# Patient Record
Sex: FEMALE | Race: WHITE | NOT HISPANIC OR LATINO | Employment: OTHER | URBAN - METROPOLITAN AREA
[De-identification: names, ages, dates, MRNs, and addresses within clinical notes are randomized per-mention and may not be internally consistent; named-entity substitution may affect disease eponyms.]

---

## 2017-01-19 ENCOUNTER — TRANSCRIBE ORDERS (OUTPATIENT)
Dept: ADMINISTRATIVE | Facility: HOSPITAL | Age: 67
End: 2017-01-19

## 2017-01-19 ENCOUNTER — LAB (OUTPATIENT)
Dept: LAB | Facility: HOSPITAL | Age: 67
End: 2017-01-19

## 2017-01-19 DIAGNOSIS — E55.9 UNSPECIFIED VITAMIN D DEFICIENCY: ICD-10-CM

## 2017-01-19 DIAGNOSIS — G47.9 SLEEP DISORDER: Primary | ICD-10-CM

## 2017-01-19 DIAGNOSIS — G47.9 SLEEP DISORDER: ICD-10-CM

## 2017-01-19 LAB
ALBUMIN SERPL-MCNC: 4.6 G/DL (ref 3.5–5.2)
ALBUMIN/GLOB SERPL: 1.5 G/DL
ALP SERPL-CCNC: 64 U/L (ref 39–117)
ALT SERPL W P-5'-P-CCNC: 16 U/L (ref 1–33)
ANION GAP SERPL CALCULATED.3IONS-SCNC: 12.5 MMOL/L
AST SERPL-CCNC: 24 U/L (ref 1–32)
BILIRUB SERPL-MCNC: 0.4 MG/DL (ref 0.1–1.2)
BUN BLD-MCNC: 24 MG/DL (ref 8–23)
BUN/CREAT SERPL: 28.6 (ref 7–25)
CALCIUM SPEC-SCNC: 9.8 MG/DL (ref 8.6–10.5)
CHLORIDE SERPL-SCNC: 103 MMOL/L (ref 98–107)
CO2 SERPL-SCNC: 25.5 MMOL/L (ref 22–29)
CREAT BLD-MCNC: 0.84 MG/DL (ref 0.57–1)
CRP SERPL-MCNC: 0.18 MG/DL (ref 0–0.5)
DEPRECATED RDW RBC AUTO: 43.4 FL (ref 37–54)
ERYTHROCYTE [DISTWIDTH] IN BLOOD BY AUTOMATED COUNT: 13.2 % (ref 11.7–13)
FERRITIN SERPL-MCNC: 85.56 NG/ML (ref 13–150)
GFR SERPL CREATININE-BSD FRML MDRD: 68 ML/MIN/1.73
GLOBULIN UR ELPH-MCNC: 3.1 GM/DL
GLUCOSE BLD-MCNC: 99 MG/DL (ref 65–99)
HCT VFR BLD AUTO: 39.8 % (ref 35.6–45.5)
HGB BLD-MCNC: 13.2 G/DL (ref 11.9–15.5)
MCH RBC QN AUTO: 29.8 PG (ref 26.9–32)
MCHC RBC AUTO-ENTMCNC: 33.2 G/DL (ref 32.4–36.3)
MCV RBC AUTO: 89.8 FL (ref 80.5–98.2)
PLATELET # BLD AUTO: 244 10*3/MM3 (ref 140–500)
PMV BLD AUTO: 10.6 FL (ref 6–12)
POTASSIUM BLD-SCNC: 4.3 MMOL/L (ref 3.5–5.2)
PROT SERPL-MCNC: 7.7 G/DL (ref 6–8.5)
RBC # BLD AUTO: 4.43 10*6/MM3 (ref 3.9–5.2)
SODIUM BLD-SCNC: 141 MMOL/L (ref 136–145)
T3FREE SERPL-MCNC: 2.62 PG/ML (ref 2–4.4)
TSH SERPL DL<=0.05 MIU/L-ACNC: 2.09 MIU/ML (ref 0.27–4.2)
VIT B12 BLD-MCNC: 1144 PG/ML (ref 211–946)
WBC NRBC COR # BLD: 5.41 10*3/MM3 (ref 4.5–10.7)

## 2017-01-19 PROCEDURE — 86140 C-REACTIVE PROTEIN: CPT

## 2017-01-19 PROCEDURE — 36415 COLL VENOUS BLD VENIPUNCTURE: CPT

## 2017-01-19 PROCEDURE — 84481 FREE ASSAY (FT-3): CPT

## 2017-01-19 PROCEDURE — 82607 VITAMIN B-12: CPT

## 2017-01-19 PROCEDURE — 84443 ASSAY THYROID STIM HORMONE: CPT

## 2017-01-19 PROCEDURE — 80053 COMPREHEN METABOLIC PANEL: CPT

## 2017-01-19 PROCEDURE — 82306 VITAMIN D 25 HYDROXY: CPT

## 2017-01-19 PROCEDURE — 82728 ASSAY OF FERRITIN: CPT

## 2017-01-19 PROCEDURE — 85027 COMPLETE CBC AUTOMATED: CPT

## 2017-01-20 LAB — 25(OH)D3 SERPL-MCNC: 105.7 NG/ML (ref 30–100)

## 2017-04-25 ENCOUNTER — TRANSCRIBE ORDERS (OUTPATIENT)
Dept: GASTROENTEROLOGY | Facility: CLINIC | Age: 67
End: 2017-04-25

## 2017-04-25 DIAGNOSIS — Z86.010 HX OF ADENOMATOUS COLONIC POLYPS: Primary | ICD-10-CM

## 2017-05-15 ENCOUNTER — APPOINTMENT (OUTPATIENT)
Dept: WOMENS IMAGING | Facility: HOSPITAL | Age: 67
End: 2017-05-15

## 2017-05-15 PROCEDURE — 76641 ULTRASOUND BREAST COMPLETE: CPT | Performed by: RADIOLOGY

## 2017-05-18 ENCOUNTER — OUTSIDE FACILITY SERVICE (OUTPATIENT)
Dept: GASTROENTEROLOGY | Facility: CLINIC | Age: 67
End: 2017-05-18

## 2017-05-18 PROCEDURE — 45380 COLONOSCOPY AND BIOPSY: CPT | Performed by: INTERNAL MEDICINE

## 2017-05-30 ENCOUNTER — TELEPHONE (OUTPATIENT)
Dept: GASTROENTEROLOGY | Facility: CLINIC | Age: 67
End: 2017-05-30

## 2017-11-17 ENCOUNTER — APPOINTMENT (OUTPATIENT)
Dept: WOMENS IMAGING | Facility: HOSPITAL | Age: 67
End: 2017-11-17

## 2017-11-17 PROCEDURE — G0202 SCR MAMMO BI INCL CAD: HCPCS | Performed by: RADIOLOGY

## 2017-11-17 PROCEDURE — 77063 BREAST TOMOSYNTHESIS BI: CPT | Performed by: RADIOLOGY

## 2017-11-17 PROCEDURE — 76641 ULTRASOUND BREAST COMPLETE: CPT | Performed by: RADIOLOGY

## 2018-06-14 ENCOUNTER — OFFICE VISIT (OUTPATIENT)
Dept: ORTHOPEDIC SURGERY | Facility: CLINIC | Age: 68
End: 2018-06-14

## 2018-06-14 VITALS — BODY MASS INDEX: 19.81 KG/M2 | WEIGHT: 116 LBS | HEIGHT: 64 IN | TEMPERATURE: 98.3 F

## 2018-06-14 DIAGNOSIS — M19.079 ARTHRITIS OF GREAT TOE AT METATARSOPHALANGEAL JOINT: ICD-10-CM

## 2018-06-14 DIAGNOSIS — M79.671 RIGHT FOOT PAIN: ICD-10-CM

## 2018-06-14 DIAGNOSIS — M20.21 ACQUIRED HALLUX RIGIDUS OF RIGHT FOOT: Primary | ICD-10-CM

## 2018-06-14 PROCEDURE — 73630 X-RAY EXAM OF FOOT: CPT | Performed by: ORTHOPAEDIC SURGERY

## 2018-06-14 PROCEDURE — 99203 OFFICE O/P NEW LOW 30 MIN: CPT | Performed by: ORTHOPAEDIC SURGERY

## 2018-06-14 RX ORDER — TOCOPHERSOLAN (VITAMIN E TPGS) 400/15ML
LIQUID (ML) ORAL
COMMUNITY
End: 2018-07-17 | Stop reason: HOSPADM

## 2018-06-14 NOTE — PROGRESS NOTES
"Patient:  Gayatri Lee is a 68 y.o. female    Chief Complaint/ Reason for Visit:    Chief Complaint   Patient presents with   • Right Foot - Establish Care, Pain       HPI:  This pleasant lady, for whom I have cared in the past and who is an avid ballroom dancer, presents today complaining of a 6 week history of moderate aching and stabbing pain in the dorsal aspect of her right forefoot directly centered over the first metatarsal phalangeal joint.  She says that she thinks that it is \"the result of overdoing it dancing.\"  She was apparently learning some new dance steps or routines and was practicing at home and says \"I just overdid it.\"  Now the pain bothers her whenever she tries to dance and go up on her toes.  She also has pain when going up and down stairs.  She has noted swelling since this overdoing it event, and the swelling is not resolved entirely.  Standing and walking exacerbate the pain.  She has iced the foot rested it, elevated, and is taken some over-the-counter anti-inflammatories.  All of these things afford her some relief, but she was particular concerned about the persistent swelling.      PMH:    Past Medical History:   Diagnosis Date   • H/O esophageal spasm    • History of colon polyps        PSH:    Past Surgical History:   Procedure Laterality Date   • ABDOMINOPLASTY  2004   • ADENOIDECTOMY  1955   • BREAST SURGERY Left 1976    LUMPECTOMY   • BREAST SURGERY Left 2004    LUMPECTOMY   • BROW LIFT AND BLEPHAROPLASTY  2004   • COLONOSCOPY  06/03/2013    TUBULAR ADENOMAS, TORT COLON,  DR. GOMEZ   • ENDOSCOPY     • HAND SURGERY  1990   • LAPAROTOMY OOPHERECTOMY  1994   • LARYNGOSCOPY      IRRITATION   DR. CORREA   • POSTPARTUM TUBAL LIGATION  1980   • TONSILLECTOMY  1955   • WISDOM TOOTH EXTRACTION  1970       Social Hx:    Social History     Social History   • Marital status:      Spouse name: N/A   • Number of children: N/A   • Years of education: N/A     Occupational History   • " "Not on file.     Social History Main Topics   • Smoking status: Never Smoker   • Smokeless tobacco: Never Used   • Alcohol use Yes      Comment: RARELY   • Drug use: No   • Sexual activity: Defer     Other Topics Concern   • Not on file     Social History Narrative   • No narrative on file       Family Hx:    Family History   Problem Relation Age of Onset   • Cancer Father    • Heart disease Father    • Stroke Father    • Cancer Sister        Meds:    Current Outpatient Prescriptions:   •  Calcium-Magnesium-Vitamin D 500-250-200 MG-MG-UNIT tablet, Take  by mouth., Disp: , Rfl:   •  EVENING PRIMROSE OIL PO, Take 1,300 mg by mouth., Disp: , Rfl:   •  KRILL OIL PO, Take 2,500 mg by mouth., Disp: , Rfl:   •  progesterone (PROMETRIUM) 100 MG capsule, TK 1 C PO D, Disp: , Rfl: 2  •  Unable to find, 2 tablets., Disp: , Rfl:   •  Unable to find, 2 tablets., Disp: , Rfl:   •  Unable to find, 1 tablet., Disp: , Rfl:   •  Unable to find, Take 1 tablet by mouth., Disp: , Rfl:   •  Unable to find, 1 tablet., Disp: , Rfl:   •  Unable to find, 2,000 Units., Disp: , Rfl:   •  VAGIFEM 10 MCG tablet vaginal tablet, INSERT ONE T VAGINALLY THREE TIMES PER WEEK, Disp: , Rfl: 2    Allergies:    Allergies   Allergen Reactions   • Sulfa Antibiotics Rash       ROS:  Review of Systems   Musculoskeletal: Positive for arthralgias, gait problem and joint swelling.   Psychiatric/Behavioral: Positive for sleep disturbance.   All other systems reviewed and are negative.      Vitals:    06/14/18 1020   Temp: 98.3 °F (36.8 °C)   Weight: 52.6 kg (116 lb)   Height: 162.6 cm (64\")     Body mass index is 19.91 kg/m².    Physical Exam    The patient is awake, alert, and oriented ×3.  The patient is in no acute distress.  Breathing is regular and unlabored with a respiratory rate of 12/m.  Extraocular movements and pupillary responses are symmetrically intact. Sclerae are anicteric.   Hearing is within normal limits.  Speech is within normal limits.  " There is no jugular venous distention.    She walks well with no pronounced limp.  Her lower extremities are fairly symmetrical in appearance with the exception that she does have swelling and fullness dorsally over the right first MTP joint.    Right foot: Patient does have fullness and tenderness over the dorsal right first MP joint.  She has limitation of dorsiflexion up to about 40-45° with pain at the terminus.  She has pain on passive plantar flexion as well.  There is no abnormal warmth or erythema.  She has no tenderness elsewhere in the right foot.  Her Achilles tendons are moderately tight bilaterally.  Pedal pulses are intact and regular.  Sensory exams intact light touch.  She has no cellulitis, no lymphangitis, and no popliteal lymphadenopathy in the right lower extremity.        Radiology: X-rays: 3 views the patient's right foot were ordered and reviewed today to assess her complaints of pain and swelling right forefoot.  Comparison images were not available.  Today's images reveal that she does have significant osteoarthritis in the right first MTP joint with joint line narrowing in all 3 views, and significant dorsal osteophyte formation over the first MP joint noted particularly on the lateral view.  I don't see any evidence of acute fracture or stress fracture.      Assessment:     Diagnosis Plan   1. Acquired hallux rigidus of right foot     2. 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 Right foot pain  XR Foot 3+ View Right           Plan:  I discussed everything with the patient length and reviewed her x-rays with her personally.  We discussed treatment options.  I recommended that she continue to stretch her Achilles tendons, as she has assured me that she does this on a daily basis and has done so since I treated her for an injury to her left foot several years ago.    We discussed footwear considerations and options.  Specific recommendations were made.  I also recommended  that she come back next week and see Gordo to have some carbon fiber turf toe inserts created.  She would like to avoid surgery.  We discussed surgery in detail including cheilectomy versus arthrodesis.  We discussed that even with cheilectomy, the underlying joint would remain an arthritic joint, and I explained that surgery may not afford her any relief in that respect.  She says she understands.      Orders Placed This Encounter   Procedures   • XR Foot 3+ View Right     Order Specific Question:   Reason for Exam:     Answer:   right foot pain

## 2018-07-17 ENCOUNTER — OFFICE VISIT (OUTPATIENT)
Dept: FAMILY MEDICINE CLINIC | Facility: CLINIC | Age: 68
End: 2018-07-17

## 2018-07-17 VITALS
HEART RATE: 68 BPM | SYSTOLIC BLOOD PRESSURE: 112 MMHG | OXYGEN SATURATION: 98 % | HEIGHT: 64 IN | BODY MASS INDEX: 19.81 KG/M2 | DIASTOLIC BLOOD PRESSURE: 70 MMHG | WEIGHT: 116 LBS | RESPIRATION RATE: 12 BRPM

## 2018-07-17 DIAGNOSIS — R73.03 PREDIABETES: ICD-10-CM

## 2018-07-17 DIAGNOSIS — M19.079 ARTHRITIS OF BIG TOE: Primary | ICD-10-CM

## 2018-07-17 DIAGNOSIS — Z23 NEED FOR VACCINATION: ICD-10-CM

## 2018-07-17 PROCEDURE — 99213 OFFICE O/P EST LOW 20 MIN: CPT | Performed by: FAMILY MEDICINE

## 2018-07-17 PROCEDURE — 90670 PCV13 VACCINE IM: CPT | Performed by: FAMILY MEDICINE

## 2018-07-17 PROCEDURE — G0009 ADMIN PNEUMOCOCCAL VACCINE: HCPCS | Performed by: FAMILY MEDICINE

## 2018-07-17 RX ORDER — ESTRADIOL 0.03 MG/D
FILM, EXTENDED RELEASE TRANSDERMAL
COMMUNITY
Start: 2018-07-16 | End: 2019-04-19

## 2018-07-17 RX ORDER — VITAMIN E 268 MG
200 CAPSULE ORAL DAILY
COMMUNITY
End: 2019-05-08

## 2018-07-17 RX ORDER — DIINDOLYLMETHANE 100 %
150 POWDER (GRAM) MISCELLANEOUS
COMMUNITY
End: 2019-05-08

## 2018-07-17 NOTE — PATIENT INSTRUCTIONS
Look into New Balance brand professional shoes.      Consider starting a glucosamine supplement.

## 2018-07-17 NOTE — PROGRESS NOTES
"Subjective   Gayatri Lee is a 68 y.o. female.     Chief Complaint   Patient presents with   • Establish Care   • Joint pain     foot pain   • Abnormal Lab       HPI     Foot problems:  -pain at the base of the big toes bilaterally, left worse than right  -pt dances ballroom and swing dancing styles several times per week  -pt reports that her Orthopedic Surgeon, Dr. Holm at [facility] found osteoarthritis on x-rays of 1st MTP joint of the L foot but advised against surgery  -she has an appointment with Sports Med next month  -pain is worsened by rising up on the toes  -\"fredy effect\" with pain coming up into the ankles and low back  -she wears small orthotics in her shoes  -she uses Ibuprofen and ice packs with some relief    Abnormal labs:  Prediabetes with most recent HgB A1C 6.3% in April 2018  -pt participates in yoga and weight training in addition to dance and tries to follow a plant based diet with occasional intake of fish and chicken   -mildly elevated total cholesterol at 233, , , and triglcyerides 97  -TSH mildly elevated at 3.1  -pt is not fasting today    She is interested in homeopathy and eastern medicine and a practitioner she saw recommended a high protein diet, but this made her feel worse.      Follows with Dr. Elisa Chance for HRT to treat menopausal symptoms    Hx of recurrent sinus infections.  Improved since she started using a silver infused preventative nasal spray and using a home air filter         Review of Systems   Constitutional: Negative for fatigue and fever.   HENT: Negative for congestion and sore throat.    Respiratory: Negative for cough and shortness of breath.    Cardiovascular: Negative for chest pain and palpitations.   Gastrointestinal: Negative for abdominal pain, constipation, diarrhea, nausea and vomiting.   Musculoskeletal: Positive for arthralgias (bilateral foot pain, left worse than right). Negative for gait problem.   Skin: Negative for " rash and wound.   Neurological: Negative for dizziness and headaches.   Psychiatric/Behavioral: Negative for dysphoric mood. The patient is not nervous/anxious.        The following portions of the patient's history were reviewed and updated as appropriate: allergies, current medications, past family history, past medical history, past social history, past surgical history and problem list.    Past Medical History:   Diagnosis Date   • Arthritis     of feet, followed by Dr. Holm   • Elevated TSH    • Endometriosis    • Fibrocystic breast    • History of colon polyps     followed by GI, Dr. Cuba   • Hyperlipidemia    • Menopausal symptoms     followed by Integrative Medicine, Dr. Elisa Chance   • Osteopenia     followed by Gynecology, Dr. Tequila Boyle   • Prediabetes    • Rectocele     s/p surgical repair at time of hysterctomy   • Recurrent sinusitis      Past Surgical History:   Procedure Laterality Date   • ABDOMINOPLASTY  2004   • ADENOIDECTOMY  1955   • BREAST SURGERY Left 1976    LUMPECTOMY   • BREAST SURGERY Left 2004    LUMPECTOMY   • BROW LIFT AND BLEPHAROPLASTY  2004   • COLONOSCOPY  06/03/2013    TUBULAR ADENOMAS, TORT COLON,  DR. CUBA   • CYSTOCELE REPAIR  05/2017   • ENDOSCOPY     • HAND SURGERY  1990   • HYSTERECTOMY     • LAPAROTOMY OOPHERECTOMY  1994   • LARYNGOSCOPY      IRRITATION   DR. CORREA   • POSTPARTUM TUBAL LIGATION  1980   • TONSILLECTOMY  1955   • TUBAL ABDOMINAL LIGATION     • WISDOM TOOTH EXTRACTION  1970     Family History   Problem Relation Age of Onset   • Cancer Father         Skin   • Heart disease Father    • Stroke Father    • Cancer Sister    • Macular degeneration Sister    • Arthritis Sister    • Alcohol abuse Mother      Social History     Social History   • Marital status:      Social History Main Topics   • Smoking status: Never Smoker   • Smokeless tobacco: Never Used   • Alcohol use Yes      Comment: about once per month   • Drug use: No       Social  History Narrative    Lives at home in a place of her own.  Enjoys tap and ballroom dance.          Allergies   Allergen Reactions   • Sulfa Antibiotics Rash        Outpatient Medications Prior to Visit   Medication Sig Dispense Refill   • EVENING PRIMROSE OIL PO Take 1,300 mg by mouth.     • KRILL OIL PO Take 2,500 mg by mouth.     • progesterone (PROMETRIUM) 100 MG capsule TK 1 C PO D  2   • Calcium-Magnesium-Vitamin D 500-250-200 MG-MG-UNIT tablet Take  by mouth.     • Unable to find 2 tablets.     • Unable to find 2 tablets.     • Unable to find 1 tablet.     • Unable to find Take 1 tablet by mouth.     • Unable to find 1 tablet.     • Unable to find 2,000 Units.     • VAGIFEM 10 MCG tablet vaginal tablet INSERT ONE T VAGINALLY THREE TIMES PER WEEK  2     No facility-administered medications prior to visit.        Objective     Vitals:    07/17/18 1411   BP: 112/70   Pulse: 68   Resp: 12   SpO2: 98%   BMI 20    Physical Exam   Constitutional: She appears well-developed and well-nourished. No distress.   HENT:   Head: Normocephalic and atraumatic.   Neck: No thyromegaly present.   Cardiovascular: Normal rate, regular rhythm and normal heart sounds.  Exam reveals no gallop and no friction rub.    No murmur heard.  Pulmonary/Chest: Effort normal and breath sounds normal. No respiratory distress. She has no wheezes. She has no rhonchi. She has no rales.   Abdominal: Soft. Bowel sounds are normal. She exhibits no distension. There is no tenderness.   Musculoskeletal:   Moderate arthritis and crepitus of bilateral 1st MTP joints with NO erythema   Lymphadenopathy:     She has no cervical adenopathy.   Skin: Skin is warm and dry.   Psychiatric: She has a normal mood and affect. Her behavior is normal.       ASSESSMENT/PLAN       Problem List Items Addressed This Visit        Musculoskeletal and Integument    Arthritis of big toe - Primary    Relevant Medications    Trial of diclofenac (VOLTAREN) 1 % gel gel  Follow up  w/Ortho and Sports Med as scheduled       Other    Prediabetes  Continue diet and exercise modifications  Repeat fasting labs in 3 months       Other Visit Diagnoses     Need for vaccination        Relevant Orders    Pneumococcal Conjugate Vaccine 13-Valent All (PCV13) (Completed)            Patient Instructions   Look into New Balance brand professional shoes.      Consider starting a glucosamine supplement.      Return in about 3 months (around 10/17/2018) for Medicare Wellness with fasting labs.      Narcisa Villafana MD  07/17/18

## 2018-07-23 PROBLEM — R73.03 PREDIABETES: Status: ACTIVE | Noted: 2018-07-23

## 2018-08-01 ENCOUNTER — TELEPHONE (OUTPATIENT)
Dept: ORTHOPEDIC SURGERY | Facility: CLINIC | Age: 68
End: 2018-08-01

## 2018-10-15 ENCOUNTER — OFFICE VISIT (OUTPATIENT)
Dept: FAMILY MEDICINE CLINIC | Facility: CLINIC | Age: 68
End: 2018-10-15

## 2018-10-15 VITALS
HEART RATE: 65 BPM | WEIGHT: 118 LBS | SYSTOLIC BLOOD PRESSURE: 108 MMHG | RESPIRATION RATE: 12 BRPM | HEIGHT: 64 IN | BODY MASS INDEX: 20.14 KG/M2 | OXYGEN SATURATION: 99 % | DIASTOLIC BLOOD PRESSURE: 76 MMHG

## 2018-10-15 DIAGNOSIS — Z86.2 HISTORY OF ANEMIA: ICD-10-CM

## 2018-10-15 DIAGNOSIS — E78.5 HYPERLIPIDEMIA, UNSPECIFIED HYPERLIPIDEMIA TYPE: ICD-10-CM

## 2018-10-15 DIAGNOSIS — Z00.00 ANNUAL PHYSICAL EXAM: Primary | ICD-10-CM

## 2018-10-15 DIAGNOSIS — S46.811A STRAIN OF RIGHT TRAPEZIUS MUSCLE, INITIAL ENCOUNTER: ICD-10-CM

## 2018-10-15 DIAGNOSIS — Z13.1 SCREENING FOR DIABETES MELLITUS: ICD-10-CM

## 2018-10-15 DIAGNOSIS — Z11.59 NEED FOR HEPATITIS C SCREENING TEST: ICD-10-CM

## 2018-10-15 PROCEDURE — G0439 PPPS, SUBSEQ VISIT: HCPCS | Performed by: FAMILY MEDICINE

## 2018-10-15 RX ORDER — VALACYCLOVIR HYDROCHLORIDE 500 MG/1
500 TABLET, FILM COATED ORAL DAILY
Refills: 1 | COMMUNITY
Start: 2018-09-19 | End: 2019-02-12 | Stop reason: HOSPADM

## 2018-10-15 NOTE — PROGRESS NOTES
Medicare Subsequent Wellness Visit  Subjective   History of Present Illness    Gayatri Lee is a 68 y.o. female who presents for an Medicare Wellness Visit. 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    Past Medical History:   Diagnosis Date   • Arthritis    • Basal cell carcinoma     followed by Dermatology   • Elevated TSH    • Endometriosis    • Fibrocystic breast    • History of colon polyps     followed by GI, Dr. Cuba   • Hyperlipidemia    • Menopausal symptoms     followed by Integrative Medicine, Dr. Elisa Chance   • Osteopenia     followed by Gynecology, Dr. Tequila Boyle   • Prediabetes    • Rectocele     s/p surgical repair at time of hysterctomy   • Recurrent sinusitis      Past Surgical History:   Procedure Laterality Date   • ABDOMINOPLASTY  2004   • ADENOIDECTOMY  1955   • BREAST SURGERY Left 1976    LUMPECTOMY   • BREAST SURGERY Left 2004    LUMPECTOMY   • BROW LIFT AND BLEPHAROPLASTY  2004   • COLONOSCOPY  06/03/2013    TUBULAR ADENOMAS, TORT COLON,   DR. GOMEZ   • CYSTOCELE REPAIR  05/2017   • ENDOSCOPY     • HAND SURGERY  1990   • HYSTERECTOMY     • LAPAROTOMY OOPHERECTOMY  1994   • LARYNGOSCOPY      IRRITATION   DR. CORREA   • POSTPARTUM TUBAL LIGATION  1980   • TONSILLECTOMY  1955   • TUBAL ABDOMINAL LIGATION     • WISDOM TOOTH EXTRACTION  1970       Family History   Problem Relation Age of Onset   • Cancer Father         Skin   • Heart disease Father    • Stroke Father    • Cancer Sister    • Macular degeneration Sister    • Arthritis Sister    • Alcohol abuse Mother        Social History     Social History   • Marital status:        Social History Main Topics   • Smoking status: Never Smoker   • Smokeless tobacco: Never Used   • Alcohol use Yes      Comment: about once per month   • Drug use: No   • Sexual activity: Yes     Social History Narrative    Lives at home in a place of her own.  Enjoys tap and ballroom dance.           Allergies   Allergen Reactions   • Sulfa Antibiotics Rash       Outpatient Medications Prior to Visit   Medication Sig Dispense Refill   • Calcium Carbonate (CALCIUM 600 PO) Take 900 mg by mouth.     • diclofenac (VOLTAREN) 1 % gel gel Apply 4 g topically 4 (Four) Times a Day As Needed (toe pain). 100 g 1   • Diindolylmethane powder 150 mg.     • estradiol (VIVELLE-DOT) 0.025 MG/24HR patch      • Estriol 10 % cream      • EVENING PRIMROSE OIL PO Take 1,300 mg by mouth.     • KRILL OIL PO Take 2,500 mg by mouth.     • Nutritional Supplements (PYCNOGENOL PO) Take 100 mg by mouth.     • Probiotic Product (PROBIOTIC ADVANCED PO) Take  by mouth.     • progesterone (PROMETRIUM) 100 MG capsule TK 1 C PO D  2   • Ubiquinol 100 MG capsule Take 100 mg by mouth.     • vitamin E 400 UNIT capsule Take 200 Units by mouth Daily.       No facility-administered medications prior to visit.               Patient Care Team:  Narcisa Villafana MD as PCP - General (Family Medicine)  Jaimson Castellanos MD as Consulting Physician  (Dermatology)  Felix Cuba MD as Consulting Physician (Gastroenterology)  Tequila Boyle MD as Consulting Physician (Obstetrics and Gynecology)         Recent Hospitalizations:  none    Age-appropriate Screening Schedule:  Refer to the list below for future screening recommendations based on patient's age. Orders for these recommended tests are listed in the plan section. The patient has been provided with a written plan.    Health Maintenance   Topic Date Due   • ZOSTER VACCINE (1 of 2) 01/31/2000   • HEPATITIS C SCREENING  04/25/2017   • LIPID PANEL  07/23/2018   • DXA SCAN  10/01/2019 (Originally 7/17/2018)   • TDAP/TD VACCINES (1 - Tdap) 08/27/2026 (Originally 8/28/2016)   • PNEUMOCOCCAL VACCINES (65+ LOW/MEDIUM RISK) (2 of 2 - PPSV23) 07/17/2019   • MEDICARE ANNUAL WELLNESS  10/15/2019   • MAMMOGRAM  11/07/2019   • COLONOSCOPY  05/18/2020   • INFLUENZA VACCINE  Addressed       Depression Screen:   PHQ-2/PHQ-9 Depression Screening 10/15/2018   Little interest or pleasure in doing things 0   Feeling down, depressed, or hopeless 0   Total Score 0       Functional and Cognitive Screening:  Functional & Cognitive Status 10/15/2018   Do you have difficulty preparing food and eating? No   Do you have difficulty bathing yourself, getting dressed or grooming yourself? No   Do you have difficulty using the toilet? No   Do you have difficulty moving around from place to place? No   Do you have trouble with steps or getting out of a bed or a chair? No   In the past year have you fallen or experienced a near fall? No   Current Diet Well Balanced Diet   Dental Exam Up to date   Eye Exam Not up to date   Current Exercise Activities Include Yoga   Do you need help using the phone?  No   Are you deaf or do you have serious difficulty hearing?  No   Do you need help with transportation? No   Do you need help shopping? No   Do you need help preparing meals?  No   Do you need help with housework?  No   Do you need  "help with laundry? No   Do you need help taking your medications? No   Do you need help managing money? No   Do you ever drive or ride in a car without wearing a seat belt? No   Have you felt unusual stress, anger or loneliness in the last month? No   Who do you live with? Alone   If you need help, do you have trouble finding someone available to you? No   Have you been bothered in the last four weeks by sexual problems? No   Do you have difficulty concentrating, remembering or making decisions? No     Does the patient have evidence of cognitive impairment? No      Review of Systems   Constitutional: Negative for fatigue and fever.   HENT: Negative for congestion and sore throat.    Respiratory: Negative for cough and shortness of breath.    Cardiovascular: Negative for chest pain and palpitations.   Gastrointestinal: Negative for abdominal pain and nausea.   Musculoskeletal: Positive for arthralgias (R shoulder as per  HPI). Negative for myalgias.   Neurological: Negative for dizziness, weakness, numbness and headaches.       Objective     Vitals:    10/15/18 1051   BP: 108/76   Pulse: 65   Resp: 12   SpO2: 99%   Weight: 53.5 kg (118 lb)   Height: 162.6 cm (64.02\")       Body mass index is 20.24 kg/m².    Physical Exam   Constitutional: Vital signs are normal. She appears well-developed and well-nourished. No distress.   HENT:   Head: Normocephalic and atraumatic.   Nose: Nose normal.   Mouth/Throat: Oropharynx is clear and moist.   Eyes: Pupils are equal, round, and reactive to light. Conjunctivae are normal.   Neck: No thyromegaly present.   Cardiovascular: Normal rate, regular rhythm, S1 normal, S2 normal and normal heart sounds.  Exam reveals no gallop and no friction rub.    No murmur heard.  Pulses:       Radial pulses are 2+ on the right side, and 2+ on the left side.   Pulmonary/Chest: Effort normal and breath sounds normal. She has no wheezes. She has no rales.   Abdominal: Soft. Bowel sounds are normal. " She exhibits no distension. There is no hepatosplenomegaly. There is no tenderness. There is no rebound and no guarding.   Musculoskeletal: She exhibits no edema or deformity.        Right shoulder: She exhibits normal range of motion, no bony tenderness and normal strength.        Left shoulder: Normal.        Cervical back: Normal.        Thoracic back: Normal.   TTP of R trapezius muscle.  Negative empty can and Neer's tests bilaterally.     Lymphadenopathy:     She has no cervical adenopathy.        Right: No supraclavicular adenopathy present.        Left: No supraclavicular adenopathy present.   Neurological: She is alert. She has normal strength. Gait normal.   Skin: Skin is warm and dry. No cyanosis. Nails show no clubbing.   Psychiatric: She has a normal mood and affect. Her speech is normal and behavior is normal.   Nursing note and vitals reviewed.      ASSESSMENT AND PLAN      Problem List Items Addressed This Visit        Cardiovascular and Mediastinum    Hyperlipidemia    Relevant Orders    Lipid Panel With / Chol / HDL Ratio      Other Visit Diagnoses     Annual physical exam    -  Primary    Relevant Orders    CBC (No Diff)    Comprehensive Metabolic Panel    Lipid Panel With / Chol / HDL Ratio    Hepatitis C Antibody    Strain of right trapezius muscle, initial encounter        Relevant Orders    Ambulatory Referral to Physical Therapy    Need for hepatitis C screening test        Relevant Orders    Hepatitis C Antibody        Screening for diabetes mellitus        Relevant Orders    Comprehensive Metabolic Panel    History of anemia        Relevant Orders    CBC (No Diff)          Orders:  Orders Placed This Encounter   Procedures   • CBC (No Diff)   • Comprehensive Metabolic Panel   • Lipid Panel With / Chol / HDL Ratio   • Hepatitis C Antibody   • Ambulatory Referral to Physical Therapy       Follow Up:  Return in about 1 year (around 10/15/2019) for Annual.         An After Visit Summary and  PPPS with all of these plans were given to the patient.

## 2018-10-23 ENCOUNTER — LAB (OUTPATIENT)
Dept: FAMILY MEDICINE CLINIC | Facility: CLINIC | Age: 68
End: 2018-10-23

## 2018-10-23 DIAGNOSIS — Z00.00 ANNUAL PHYSICAL EXAM: ICD-10-CM

## 2018-10-23 DIAGNOSIS — E78.5 HYPERLIPIDEMIA, UNSPECIFIED HYPERLIPIDEMIA TYPE: ICD-10-CM

## 2018-10-23 DIAGNOSIS — Z11.59 NEED FOR HEPATITIS C SCREENING TEST: ICD-10-CM

## 2018-10-23 DIAGNOSIS — Z86.2 HISTORY OF ANEMIA: ICD-10-CM

## 2018-10-23 DIAGNOSIS — Z13.1 SCREENING FOR DIABETES MELLITUS: ICD-10-CM

## 2018-10-23 LAB
ALBUMIN SERPL-MCNC: 4.4 G/DL (ref 3.5–5.2)
ALBUMIN/GLOB SERPL: 1.5 G/DL
ALP SERPL-CCNC: 58 U/L (ref 39–117)
ALT SERPL-CCNC: 26 U/L (ref 1–33)
AST SERPL-CCNC: 28 U/L (ref 1–32)
BILIRUB SERPL-MCNC: 0.4 MG/DL (ref 0.1–1.2)
BUN SERPL-MCNC: 23 MG/DL (ref 8–23)
BUN/CREAT SERPL: 29.5 (ref 7–25)
CALCIUM SERPL-MCNC: 9.8 MG/DL (ref 8.6–10.5)
CHLORIDE SERPL-SCNC: 102 MMOL/L (ref 98–107)
CHOLEST SERPL-MCNC: 243 MG/DL (ref 0–200)
CHOLEST/HDLC SERPL: 2.51 {RATIO}
CO2 SERPL-SCNC: 28.3 MMOL/L (ref 22–29)
CREAT SERPL-MCNC: 0.78 MG/DL (ref 0.57–1)
ERYTHROCYTE [DISTWIDTH] IN BLOOD BY AUTOMATED COUNT: 14.3 % (ref 11.7–13)
GLOBULIN SER CALC-MCNC: 2.9 GM/DL
GLUCOSE SERPL-MCNC: 91 MG/DL (ref 65–99)
HCT VFR BLD AUTO: 40.9 % (ref 35.6–45.5)
HDLC SERPL-MCNC: 97 MG/DL (ref 40–60)
HGB BLD-MCNC: 12.8 G/DL (ref 11.9–15.5)
LDLC SERPL CALC-MCNC: 131 MG/DL (ref 0–100)
MCH RBC QN AUTO: 28.8 PG (ref 26.9–32)
MCHC RBC AUTO-ENTMCNC: 31.3 G/DL (ref 32.4–36.3)
MCV RBC AUTO: 91.9 FL (ref 80.5–98.2)
PLATELET # BLD AUTO: 276 10*3/MM3 (ref 140–500)
POTASSIUM SERPL-SCNC: 4.4 MMOL/L (ref 3.5–5.2)
PROT SERPL-MCNC: 7.3 G/DL (ref 6–8.5)
RBC # BLD AUTO: 4.45 10*6/MM3 (ref 3.9–5.2)
SODIUM SERPL-SCNC: 142 MMOL/L (ref 136–145)
TRIGL SERPL-MCNC: 74 MG/DL (ref 0–150)
VLDLC SERPL CALC-MCNC: 14.8 MG/DL (ref 5–40)
WBC # BLD AUTO: 6 10*3/MM3 (ref 4.5–10.7)

## 2018-10-24 LAB — HCV AB S/CO SERPL IA: <0.1 S/CO RATIO (ref 0–0.9)

## 2018-10-29 ENCOUNTER — TELEPHONE (OUTPATIENT)
Dept: FAMILY MEDICINE CLINIC | Facility: CLINIC | Age: 68
End: 2018-10-29

## 2018-10-29 NOTE — TELEPHONE ENCOUNTER
Please call her back and advise her that her BUN/Creatinine ratio is only mildly increased, and unlikely to cause any significant problems for her at this time.  However, increasing her water intake should help normalize it.

## 2018-10-29 NOTE — TELEPHONE ENCOUNTER
Called and spoke with patient to let her know lab results and advise diet & exercise. She asked about her BUN/ Creatinine level and if there was any diet plan or tips she could do to bring that down.    ----- Message from Narcisa Villafana MD sent at 10/29/2018  7:51 AM EDT -----  Please contact pt and let her know that her cholesterol is a little high, but not to the degree that she needs medication at this time.   Exercising regularly and eating a healthy diet rich in fresh produce, fiber, and lean protein but low in added sugar and fat can help manage this problem and reduce the risk for heart disease in the future.  Let's plan to re-check it in 6 months-1 year.

## 2018-11-19 ENCOUNTER — APPOINTMENT (OUTPATIENT)
Dept: WOMENS IMAGING | Facility: HOSPITAL | Age: 68
End: 2018-11-19

## 2018-11-19 PROCEDURE — 76641 ULTRASOUND BREAST COMPLETE: CPT | Performed by: RADIOLOGY

## 2018-11-19 PROCEDURE — 77063 BREAST TOMOSYNTHESIS BI: CPT | Performed by: RADIOLOGY

## 2018-11-19 PROCEDURE — MDREVIEWSP: Performed by: RADIOLOGY

## 2018-11-19 PROCEDURE — 77067 SCR MAMMO BI INCL CAD: CPT | Performed by: RADIOLOGY

## 2019-02-10 ENCOUNTER — APPOINTMENT (OUTPATIENT)
Dept: ULTRASOUND IMAGING | Facility: HOSPITAL | Age: 69
End: 2019-02-10

## 2019-02-10 ENCOUNTER — HOSPITAL ENCOUNTER (INPATIENT)
Facility: HOSPITAL | Age: 69
LOS: 2 days | Discharge: HOME | End: 2019-02-12
Attending: EMERGENCY MEDICINE | Admitting: INTERNAL MEDICINE

## 2019-02-10 ENCOUNTER — APPOINTMENT (OUTPATIENT)
Dept: CT IMAGING | Facility: HOSPITAL | Age: 69
End: 2019-02-10

## 2019-02-10 DIAGNOSIS — N61.1 ABSCESS OF RIGHT BREAST UNRELATED TO PREGNANCY OR BREASTFEEDING: Primary | ICD-10-CM

## 2019-02-10 LAB
ALBUMIN SERPL-MCNC: 4.4 G/DL (ref 3.5–5.2)
ALBUMIN/GLOB SERPL: 1 G/DL
ALP SERPL-CCNC: 76 U/L (ref 39–117)
ALT SERPL W P-5'-P-CCNC: 49 U/L (ref 1–33)
ANION GAP SERPL CALCULATED.3IONS-SCNC: 14.5 MMOL/L
AST SERPL-CCNC: 27 U/L (ref 1–32)
BACTERIA UR QL AUTO: ABNORMAL /HPF
BASOPHILS # BLD AUTO: 0.09 10*3/MM3 (ref 0–0.2)
BASOPHILS NFR BLD AUTO: 0.9 % (ref 0–1.5)
BILIRUB SERPL-MCNC: 0.4 MG/DL (ref 0.1–1.2)
BILIRUB UR QL STRIP: NEGATIVE
BUN BLD-MCNC: 17 MG/DL (ref 8–23)
BUN/CREAT SERPL: 22.7 (ref 7–25)
CALCIUM SPEC-SCNC: 10.7 MG/DL (ref 8.6–10.5)
CHLORIDE SERPL-SCNC: 97 MMOL/L (ref 98–107)
CLARITY UR: CLEAR
CO2 SERPL-SCNC: 27.5 MMOL/L (ref 22–29)
COLOR UR: YELLOW
CREAT BLD-MCNC: 0.75 MG/DL (ref 0.57–1)
D-LACTATE SERPL-SCNC: 0.8 MMOL/L (ref 0.5–2)
DEPRECATED RDW RBC AUTO: 44.8 FL (ref 37–54)
EOSINOPHIL # BLD AUTO: 0.26 10*3/MM3 (ref 0–0.7)
EOSINOPHIL NFR BLD AUTO: 2.5 % (ref 0.3–6.2)
ERYTHROCYTE [DISTWIDTH] IN BLOOD BY AUTOMATED COUNT: 13.2 % (ref 11.7–13)
GFR SERPL CREATININE-BSD FRML MDRD: 77 ML/MIN/1.73
GLOBULIN UR ELPH-MCNC: 4.2 GM/DL
GLUCOSE BLD-MCNC: 95 MG/DL (ref 65–99)
GLUCOSE UR STRIP-MCNC: NEGATIVE MG/DL
HCT VFR BLD AUTO: 40.4 % (ref 35.6–45.5)
HGB BLD-MCNC: 13.4 G/DL (ref 11.9–15.5)
HGB UR QL STRIP.AUTO: ABNORMAL
HOLD SPECIMEN: NORMAL
HOLD SPECIMEN: NORMAL
HYALINE CASTS UR QL AUTO: ABNORMAL /LPF
IMM GRANULOCYTES # BLD AUTO: 0.02 10*3/MM3 (ref 0–0.03)
IMM GRANULOCYTES NFR BLD AUTO: 0.2 % (ref 0–0.5)
INR PPP: 0.96 (ref 0.9–1.1)
KETONES UR QL STRIP: NEGATIVE
LEUKOCYTE ESTERASE UR QL STRIP.AUTO: NEGATIVE
LYMPHOCYTES # BLD AUTO: 1.93 10*3/MM3 (ref 0.9–4.8)
LYMPHOCYTES NFR BLD AUTO: 18.5 % (ref 19.6–45.3)
MCH RBC QN AUTO: 30.8 PG (ref 26.9–32)
MCHC RBC AUTO-ENTMCNC: 33.2 G/DL (ref 32.4–36.3)
MCV RBC AUTO: 92.9 FL (ref 80.5–98.2)
MONOCYTES # BLD AUTO: 0.73 10*3/MM3 (ref 0.2–1.2)
MONOCYTES NFR BLD AUTO: 7 % (ref 5–12)
NEUTROPHILS # BLD AUTO: 7.42 10*3/MM3 (ref 1.9–8.1)
NEUTROPHILS NFR BLD AUTO: 70.9 % (ref 42.7–76)
NITRITE UR QL STRIP: NEGATIVE
PH UR STRIP.AUTO: 6.5 [PH] (ref 5–8)
PLATELET # BLD AUTO: 321 10*3/MM3 (ref 140–500)
PMV BLD AUTO: 10 FL (ref 6–12)
POTASSIUM BLD-SCNC: 4.3 MMOL/L (ref 3.5–5.2)
PROT SERPL-MCNC: 8.6 G/DL (ref 6–8.5)
PROT UR QL STRIP: NEGATIVE
PROTHROMBIN TIME: 12.6 SECONDS (ref 11.7–14.2)
RBC # BLD AUTO: 4.35 10*6/MM3 (ref 3.9–5.2)
RBC # UR: ABNORMAL /HPF
REF LAB TEST METHOD: ABNORMAL
SODIUM BLD-SCNC: 139 MMOL/L (ref 136–145)
SP GR UR STRIP: 1.02 (ref 1–1.03)
SQUAMOUS #/AREA URNS HPF: ABNORMAL /HPF
UROBILINOGEN UR QL STRIP: ABNORMAL
WBC NRBC COR # BLD: 10.45 10*3/MM3 (ref 4.5–10.7)
WBC UR QL AUTO: ABNORMAL /HPF
WHOLE BLOOD HOLD SPECIMEN: NORMAL
WHOLE BLOOD HOLD SPECIMEN: NORMAL

## 2019-02-10 PROCEDURE — 80053 COMPREHEN METABOLIC PANEL: CPT | Performed by: NURSE PRACTITIONER

## 2019-02-10 PROCEDURE — 25010000002 IOPAMIDOL 61 % SOLUTION: Performed by: INTERNAL MEDICINE

## 2019-02-10 PROCEDURE — 71260 CT THORAX DX C+: CPT

## 2019-02-10 PROCEDURE — 25010000002 VANCOMYCIN PER 500 MG: Performed by: NURSE PRACTITIONER

## 2019-02-10 PROCEDURE — 81001 URINALYSIS AUTO W/SCOPE: CPT | Performed by: NURSE PRACTITIONER

## 2019-02-10 PROCEDURE — 87040 BLOOD CULTURE FOR BACTERIA: CPT | Performed by: NURSE PRACTITIONER

## 2019-02-10 PROCEDURE — 25010000002 PIPERACILLIN SOD-TAZOBACTAM PER 1 G: Performed by: NURSE PRACTITIONER

## 2019-02-10 PROCEDURE — 85025 COMPLETE CBC W/AUTO DIFF WBC: CPT | Performed by: NURSE PRACTITIONER

## 2019-02-10 PROCEDURE — 99284 EMERGENCY DEPT VISIT MOD MDM: CPT

## 2019-02-10 PROCEDURE — 99221 1ST HOSP IP/OBS SF/LOW 40: CPT | Performed by: SURGERY

## 2019-02-10 PROCEDURE — 85610 PROTHROMBIN TIME: CPT | Performed by: NURSE PRACTITIONER

## 2019-02-10 PROCEDURE — 83605 ASSAY OF LACTIC ACID: CPT | Performed by: EMERGENCY MEDICINE

## 2019-02-10 PROCEDURE — 25010000002 PIPERACILLIN SOD-TAZOBACTAM PER 1 G: Performed by: INTERNAL MEDICINE

## 2019-02-10 RX ORDER — SODIUM CHLORIDE 0.9 % (FLUSH) 0.9 %
10 SYRINGE (ML) INJECTION AS NEEDED
Status: DISCONTINUED | OUTPATIENT
Start: 2019-02-10 | End: 2019-02-12 | Stop reason: HOSPADM

## 2019-02-10 RX ORDER — OXYCODONE HYDROCHLORIDE AND ACETAMINOPHEN 5; 325 MG/1; MG/1
1 TABLET ORAL ONCE
Status: DISCONTINUED | OUTPATIENT
Start: 2019-02-10 | End: 2019-02-12 | Stop reason: HOSPADM

## 2019-02-10 RX ORDER — SODIUM CHLORIDE 0.9 % (FLUSH) 0.9 %
3-10 SYRINGE (ML) INJECTION AS NEEDED
Status: DISCONTINUED | OUTPATIENT
Start: 2019-02-10 | End: 2019-02-12 | Stop reason: HOSPADM

## 2019-02-10 RX ORDER — HYDROCODONE BITARTRATE AND ACETAMINOPHEN 5; 325 MG/1; MG/1
1 TABLET ORAL EVERY 4 HOURS PRN
Status: DISCONTINUED | OUTPATIENT
Start: 2019-02-10 | End: 2019-02-12 | Stop reason: HOSPADM

## 2019-02-10 RX ORDER — LANOLIN ALCOHOL/MO/W.PET/CERES
400 CREAM (GRAM) TOPICAL DAILY
COMMUNITY
End: 2019-09-16

## 2019-02-10 RX ORDER — ONDANSETRON 4 MG/1
4 TABLET, FILM COATED ORAL EVERY 6 HOURS PRN
Status: DISCONTINUED | OUTPATIENT
Start: 2019-02-10 | End: 2019-02-12 | Stop reason: HOSPADM

## 2019-02-10 RX ORDER — VANCOMYCIN HYDROCHLORIDE 1 G/200ML
20 INJECTION, SOLUTION INTRAVENOUS ONCE
Status: COMPLETED | OUTPATIENT
Start: 2019-02-10 | End: 2019-02-10

## 2019-02-10 RX ORDER — SENNA AND DOCUSATE SODIUM 50; 8.6 MG/1; MG/1
2 TABLET, FILM COATED ORAL 2 TIMES DAILY PRN
Status: DISCONTINUED | OUTPATIENT
Start: 2019-02-10 | End: 2019-02-12 | Stop reason: HOSPADM

## 2019-02-10 RX ORDER — LANOLIN ALCOHOL/MO/W.PET/CERES
20 CREAM (GRAM) TOPICAL DAILY
COMMUNITY
End: 2019-05-08

## 2019-02-10 RX ORDER — ONDANSETRON 4 MG/1
4 TABLET, ORALLY DISINTEGRATING ORAL EVERY 6 HOURS PRN
Status: DISCONTINUED | OUTPATIENT
Start: 2019-02-10 | End: 2019-02-12 | Stop reason: HOSPADM

## 2019-02-10 RX ORDER — MULTIVIT-MIN/IRON/FOLIC ACID/K 18-600-40
135 CAPSULE ORAL DAILY
COMMUNITY
End: 2019-08-15

## 2019-02-10 RX ORDER — ONDANSETRON 2 MG/ML
4 INJECTION INTRAMUSCULAR; INTRAVENOUS EVERY 6 HOURS PRN
Status: DISCONTINUED | OUTPATIENT
Start: 2019-02-10 | End: 2019-02-12 | Stop reason: HOSPADM

## 2019-02-10 RX ORDER — ACETAMINOPHEN 325 MG/1
650 TABLET ORAL EVERY 4 HOURS PRN
Status: DISCONTINUED | OUTPATIENT
Start: 2019-02-10 | End: 2019-02-12 | Stop reason: HOSPADM

## 2019-02-10 RX ORDER — SODIUM CHLORIDE 0.9 % (FLUSH) 0.9 %
3 SYRINGE (ML) INJECTION EVERY 12 HOURS SCHEDULED
Status: DISCONTINUED | OUTPATIENT
Start: 2019-02-10 | End: 2019-02-12 | Stop reason: HOSPADM

## 2019-02-10 RX ADMIN — SODIUM CHLORIDE 1000 ML: 9 INJECTION, SOLUTION INTRAVENOUS at 12:07

## 2019-02-10 RX ADMIN — SODIUM CHLORIDE, PRESERVATIVE FREE 10 ML: 5 INJECTION INTRAVENOUS at 12:12

## 2019-02-10 RX ADMIN — IOPAMIDOL 75 ML: 612 INJECTION, SOLUTION INTRAVENOUS at 14:23

## 2019-02-10 RX ADMIN — TAZOBACTAM SODIUM AND PIPERACILLIN SODIUM 3.38 G: 375; 3 INJECTION, SOLUTION INTRAVENOUS at 17:02

## 2019-02-10 RX ADMIN — VANCOMYCIN HYDROCHLORIDE 1000 MG: 1 INJECTION, SOLUTION INTRAVENOUS at 13:04

## 2019-02-10 RX ADMIN — TAZOBACTAM SODIUM AND PIPERACILLIN SODIUM 3.38 G: 375; 3 INJECTION, SOLUTION INTRAVENOUS at 12:23

## 2019-02-10 NOTE — ED PROVIDER NOTES
"EMERGENCY DEPARTMENT ENCOUNTER    Room Number:  ANDREA/ANDREA  Date seen:  2/10/2019  Time seen: 11:40 AM  PCP: Narcisa Villafana MD    HPI:  Chief complaint: R Breast Pain  Context:Gayatri Lee is a 69 y.o. female who presents to the ED with c/o right breast pain due to mastitis that was diagnosed by her OBGYN's NP three days ago. Pt was sent home on doxycycline at that time with no relief.  Pt reports she has pain and swelling to the right breast with fever and chills that started four days ago.     Timing:constant  Duration: four days  Location:right breast  Radiation:none  Quality:\"pain\"  Intensity/Severity:severe  Associated Symptoms: right breast swelling, fever, chills  Aggravating Factors:none  Alleviating Factors:none  Previous Episodes:none  Treatment before arrival:Doxycycline with no relief.    MEDICAL RECORD REVIEW  Reviewed from [facility]    ALLERGIES  Sulfa antibiotics    PAST MEDICAL HISTORY  Active Ambulatory Problems     Diagnosis Date Noted   • Right foot pain 06/14/2018   • Arthritis of big toe 06/14/2018   • Acquired hallux rigidus of right foot 06/14/2018   • Osteopenia    • History of colon polyps    • Recurrent sinusitis    • Menopausal symptoms    • Prediabetes 07/23/2018   • Hyperlipidemia      Resolved Ambulatory Problems     Diagnosis Date Noted   • No Resolved Ambulatory Problems     Past Medical History:   Diagnosis Date   • Arthritis    • Basal cell carcinoma    • Endometriosis    • Fibrocystic breast    • History of colon polyps    • Hyperlipidemia    • Menopausal symptoms    • Osteopenia    • Prediabetes    • Rectocele    • Recurrent sinusitis        PAST SURGICAL HISTORY  Past Surgical History:   Procedure Laterality Date   • ABDOMINOPLASTY  2004   • ADENOIDECTOMY  1955   • BREAST SURGERY Left 1976    LUMPECTOMY   • BREAST SURGERY Left 2004    LUMPECTOMY   • BROW LIFT AND BLEPHAROPLASTY  2004   • COLONOSCOPY  06/03/2013    TUBULAR ADENOMAS, TORT COLON,  DR. GOMEZ   • CYSTOCELE " REPAIR  05/2017   • ENDOSCOPY     • HAND SURGERY  1990   • HYSTERECTOMY     • LAPAROTOMY OOPHERECTOMY  1994   • LARYNGOSCOPY      IRRITATION   DR. CORREA   • POSTPARTUM TUBAL LIGATION  1980   • TONSILLECTOMY  1955   • TUBAL ABDOMINAL LIGATION     • WISDOM TOOTH EXTRACTION  1970       FAMILY HISTORY  Family History   Problem Relation Age of Onset   • Cancer Father         Skin   • Heart disease Father    • Stroke Father    • Cancer Sister    • Macular degeneration Sister    • Arthritis Sister    • Alcohol abuse Mother        SOCIAL HISTORY  Social History     Socioeconomic History   • Marital status:      Spouse name: Not on file   • Number of children: Not on file   • Years of education: Not on file   • Highest education level: Not on file   Social Needs   • Financial resource strain: Not on file   • Food insecurity - worry: Not on file   • Food insecurity - inability: Not on file   • Transportation needs - medical: Not on file   • Transportation needs - non-medical: Not on file   Occupational History   • Not on file   Tobacco Use   • Smoking status: Never Smoker   • Smokeless tobacco: Never Used   Substance and Sexual Activity   • Alcohol use: Yes     Comment: about once per month   • Drug use: No   • Sexual activity: Defer   Other Topics Concern   • Not on file   Social History Narrative    Lives at home in a place of her own.  Enjoys tap and ballroom dance.         REVIEW OF SYSTEMS  Review of Systems   Constitutional: Positive for chills and fever. Negative for activity change, appetite change and diaphoresis.   HENT: Negative for trouble swallowing.    Eyes: Negative for visual disturbance.   Respiratory: Negative for cough, chest tightness, shortness of breath and wheezing.    Cardiovascular: Negative for chest pain, palpitations and leg swelling.   Gastrointestinal: Negative for abdominal pain, diarrhea, nausea and vomiting.   Genitourinary: Negative for dysuria.   Musculoskeletal: Positive for  myalgias (right breast with swelling). Negative for back pain.   Skin: Negative for rash.   Neurological: Negative for dizziness, speech difficulty and light-headedness.       PHYSICAL EXAM  ED Triage Vitals [02/10/19 1125]   Temp Heart Rate Resp BP SpO2   97 °F (36.1 °C) 79 18 134/78 98 %      Temp src Heart Rate Source Patient Position BP Location FiO2 (%)   Tympanic Monitor -- -- --     Physical Exam   Constitutional: She is oriented to person, place, and time and well-developed, well-nourished, and in no distress. She does not have a sickly appearance. No distress.   HENT:   Head: Normocephalic and atraumatic.   Mouth/Throat: Uvula is midline, oropharynx is clear and moist and mucous membranes are normal.   Eyes: EOM are normal. Pupils are equal, round, and reactive to light.   Neck: Normal range of motion. Neck supple.   Cardiovascular: Normal rate, regular rhythm, S1 normal, S2 normal and normal heart sounds. Exam reveals no gallop and no friction rub.   No murmur heard.  Pulmonary/Chest: Effort normal and breath sounds normal. She has no decreased breath sounds. She has no wheezes. She has no rhonchi. She has no rales. Right breast exhibits tenderness (with swelling and erythema). Right breast exhibits no nipple discharge. Breasts are asymmetrical (right significantly greater than left).   No stranding to axilla  Mulitple well healed scars from prior lumpectomies.   Abdominal: Soft. Normal appearance and bowel sounds are normal. There is no tenderness. There is no rebound and no guarding.   Musculoskeletal: Normal range of motion.   Lymphadenopathy:     She has axillary adenopathy (right sided).   Neurological: She is alert and oriented to person, place, and time. GCS score is 15.   Skin: Skin is warm, dry and intact.   Psychiatric: Affect and judgment normal.   Nursing note and vitals reviewed.      LAB RESULTS  Recent Results (from the past 24 hour(s))   Light Blue Top    Collection Time: 02/10/19 12:03 PM    Result Value Ref Range    Extra Tube hold for add-on    Green Top (Gel)    Collection Time: 02/10/19 12:03 PM   Result Value Ref Range    Extra Tube Hold for add-ons.    Lavender Top    Collection Time: 02/10/19 12:03 PM   Result Value Ref Range    Extra Tube hold for add-on    Gold Top - SST    Collection Time: 02/10/19 12:03 PM   Result Value Ref Range    Extra Tube Hold for add-ons.    Comprehensive Metabolic Panel    Collection Time: 02/10/19 12:03 PM   Result Value Ref Range    Glucose 95 65 - 99 mg/dL    BUN 17 8 - 23 mg/dL    Creatinine 0.75 0.57 - 1.00 mg/dL    Sodium 139 136 - 145 mmol/L    Potassium 4.3 3.5 - 5.2 mmol/L    Chloride 97 (L) 98 - 107 mmol/L    CO2 27.5 22.0 - 29.0 mmol/L    Calcium 10.7 (H) 8.6 - 10.5 mg/dL    Total Protein 8.6 (H) 6.0 - 8.5 g/dL    Albumin 4.40 3.50 - 5.20 g/dL    ALT (SGPT) 49 (H) 1 - 33 U/L    AST (SGOT) 27 1 - 32 U/L    Alkaline Phosphatase 76 39 - 117 U/L    Total Bilirubin 0.4 0.1 - 1.2 mg/dL    eGFR Non African Amer 77 >60 mL/min/1.73    Globulin 4.2 gm/dL    A/G Ratio 1.0 g/dL    BUN/Creatinine Ratio 22.7 7.0 - 25.0    Anion Gap 14.5 mmol/L   Protime-INR    Collection Time: 02/10/19 12:03 PM   Result Value Ref Range    Protime 12.6 11.7 - 14.2 Seconds    INR 0.96 0.90 - 1.10   CBC Auto Differential    Collection Time: 02/10/19 12:03 PM   Result Value Ref Range    WBC 10.45 4.50 - 10.70 10*3/mm3    RBC 4.35 3.90 - 5.20 10*6/mm3    Hemoglobin 13.4 11.9 - 15.5 g/dL    Hematocrit 40.4 35.6 - 45.5 %    MCV 92.9 80.5 - 98.2 fL    MCH 30.8 26.9 - 32.0 pg    MCHC 33.2 32.4 - 36.3 g/dL    RDW 13.2 (H) 11.7 - 13.0 %    RDW-SD 44.8 37.0 - 54.0 fl    MPV 10.0 6.0 - 12.0 fL    Platelets 321 140 - 500 10*3/mm3    Neutrophil % 70.9 42.7 - 76.0 %    Lymphocyte % 18.5 (L) 19.6 - 45.3 %    Monocyte % 7.0 5.0 - 12.0 %    Eosinophil % 2.5 0.3 - 6.2 %    Basophil % 0.9 0.0 - 1.5 %    Immature Grans % 0.2 0.0 - 0.5 %    Neutrophils, Absolute 7.42 1.90 - 8.10 10*3/mm3    Lymphocytes,  Absolute 1.93 0.90 - 4.80 10*3/mm3    Monocytes, Absolute 0.73 0.20 - 1.20 10*3/mm3    Eosinophils, Absolute 0.26 0.00 - 0.70 10*3/mm3    Basophils, Absolute 0.09 0.00 - 0.20 10*3/mm3    Immature Grans, Absolute 0.02 0.00 - 0.03 10*3/mm3   Lactic Acid, Plasma    Collection Time: 02/10/19 12:04 PM   Result Value Ref Range    Lactate 0.8 0.5 - 2.0 mmol/L   Urinalysis With Microscopic If Indicated (No Culture) - Urine, Clean Catch    Collection Time: 02/10/19 12:22 PM   Result Value Ref Range    Color, UA Yellow Yellow, Straw    Appearance, UA Clear Clear    pH, UA 6.5 5.0 - 8.0    Specific Gravity, UA 1.016 1.005 - 1.030    Glucose, UA Negative Negative    Ketones, UA Negative Negative    Bilirubin, UA Negative Negative    Blood, UA Moderate (2+) (A) Negative    Protein, UA Negative Negative    Leuk Esterase, UA Negative Negative    Nitrite, UA Negative Negative    Urobilinogen, UA 0.2 E.U./dL 0.2 - 1.0 E.U./dL   Urinalysis, Microscopic Only - Urine, Clean Catch    Collection Time: 02/10/19 12:22 PM   Result Value Ref Range    RBC, UA 31-50 (A) None Seen, 0-2 /HPF    WBC, UA 0-2 None Seen, 0-2 /HPF    Bacteria, UA None Seen None Seen /HPF    Squamous Epithelial Cells, UA 7-12 (A) None Seen, 0-2 /HPF    Hyaline Casts, UA 0-2 None Seen /LPF    Methodology Automated Microscopy        I ordered the above labs and reviewed the results    RADIOLOGY  CT Chest With Contrast   Preliminary Result   1. Enlarged right breast with diffuse increased density and overlying   skin thickening. Findings could be related to acute inflammatory   process/phlegmon versus neoplasm. A loculated fluid collection is not   seen.   2. Findings were discussed with Osman Alfaro and Denisha.       Radiation dose reduction techniques were utilized, including automated   exposure control and exposure modulation based on body size.              US Breast Right Complete    (Results Pending)       I ordered the above noted radiological studies and reviewed  the images on the PACS system.     MEDICATIONS GIVEN IN ER  Medications   sodium chloride 0.9 % flush 10 mL (10 mL Intravenous Given 2/10/19 1212)   oxyCODONE-acetaminophen (PERCOCET) 5-325 MG per tablet 1 tablet (1 tablet Oral Not Given 2/10/19 1215)   sodium chloride 0.9 % flush 3 mL (not administered)   sodium chloride 0.9 % flush 3-10 mL (not administered)   acetaminophen (TYLENOL) tablet 650 mg (not administered)   HYDROcodone-acetaminophen (NORCO) 5-325 MG per tablet 1 tablet (not administered)   sennosides-docusate sodium (SENOKOT-S) 8.6-50 MG tablet 2 tablet (not administered)   ondansetron (ZOFRAN) tablet 4 mg (not administered)     Or   ondansetron ODT (ZOFRAN-ODT) disintegrating tablet 4 mg (not administered)     Or   ondansetron (ZOFRAN) injection 4 mg (not administered)   Pharmacy to dose vancomycin (not administered)   Pharmacy to Dose Zosyn (not administered)   vancomycin 500 mg/100 mL 0.9% NS IVPB (mbp) (not administered)   piperacillin-tazobactam (ZOSYN) 3.375 g in iso-osmotic dextrose 50 ml (premix) (not administered)   vancomycin (VANCOCIN) in iso-osmotic dextrose IVPB 1 g (premix) 200 mL (0 mg/kg × 52.6 kg Intravenous Stopped 2/10/19 1509)   piperacillin-tazobactam (ZOSYN) 3.375 g in iso-osmotic dextrose 50 ml (premix) (0 g Intravenous Stopped 2/10/19 1253)   sodium chloride 0.9 % bolus 1,000 mL (0 mL Intravenous Stopped 2/10/19 1429)   iopamidol (ISOVUE-300) 61 % injection 100 mL (75 mL Intravenous Given by Other 2/10/19 1423)       PROCEDURES  Procedures    COURSE & MEDICAL DECISION MAKING  Pertinent Labs and Imaging studies that were ordered and reviewed are noted above.  Results were reviewed/discussed with the patient and they were also made aware of online access.  Pt also made aware that some labs, such as cultures, will not be resulted during ER visit and follow up with PMD is necessary.     PROGRESS AND CONSULTS    Progress Notes:       1145  Discussed that the pt has failed outpatient  "treatment and will need to be admitted for IV antibiotics and a consult with breast surgery. Pt understands and agrees with the plan. All questions have been answered.    1152   Reviewed pt's history and workup with Dr. Alfaro.  After a bedside evaluation, Dr. Alfaro agrees with the plan of care.    1154  Rechecked patient who is resting. Discussed plan to do a chest CT and give medication for pain. Pt understands and agrees with the plan. All questions have been answered.    1242  Discussed case with Dr Hernandez, [facility]  Reviewed history, exam, results and treatments.  Discussed concerns and plan of care. Dr Hernandez accepts pt to be admitted to med/surg.      1244  Based on the patient's lab findings and presenting symptoms, the doctor and I feel it is appropriate to admit the patient for further management, evaluation, and treatment.  I have discussed this with the admitting team.  I have also discussed this with the patient/family.  They are in agreement with admission.      1300  Dr. Denny, breast surgeon here in ER to evaluate patient.  US ordered for am.  He will consult along with A.      Disposition vitals:  /85   Pulse 77   Temp 97 °F (36.1 °C) (Tympanic)   Resp 18   Ht 162.6 cm (64\")   Wt 52.6 kg (116 lb)   SpO2 99%   BMI 19.91 kg/m²       DIAGNOSIS  Final diagnoses:   Abscess of right breast unrelated to pregnancy or breastfeeding         Documentation assistance provided by ronda Ruvalcaba for LALA Dunlap.  Information recorded by the ronda was done at my direction and has been verified and validated by me.             Krista Ruvalcaba  02/10/19 1300       Jenifer Mclean APRN  02/10/19 1515    "

## 2019-02-10 NOTE — PLAN OF CARE
Problem: Patient Care Overview  Goal: Plan of Care Review  Outcome: Ongoing (interventions implemented as appropriate)   02/10/19 2160   Coping/Psychosocial   Plan of Care Reviewed With patient   OTHER   Outcome Summary vss, rt. breast, red, warm & firm to touch, iv antibx started in er, CT done, u/s to be done ,      Goal: Individualization and Mutuality  Outcome: Ongoing (interventions implemented as appropriate)    Goal: Discharge Needs Assessment  Outcome: Ongoing (interventions implemented as appropriate)    Goal: Interprofessional Rounds/Family Conf  Outcome: Ongoing (interventions implemented as appropriate)      Problem: Pain, Acute (Adult)  Goal: Identify Related Risk Factors and Signs and Symptoms  Outcome: Ongoing (interventions implemented as appropriate)    Goal: Acceptable Pain Control/Comfort Level  Outcome: Ongoing (interventions implemented as appropriate)

## 2019-02-10 NOTE — ED NOTES
Pt to ED with c/o mastitis to R breast, diagnosed last Thursday, pt was running low-grade fever 99.8. On abx, not improving. Pt has redness, tenderness, warmth present to area. Pt denies n/v/d.      Mandie Mariee, RN  02/10/19 4329

## 2019-02-10 NOTE — H&P
Name: Gayatri Lee ADMIT: 2/10/2019   : 1950  PCP: Narcisa Villafana MD    MRN: [id number] LOS: 0 days   AGE/SEX: 69 y.o. female  ROOM:      Chief Complaint   Patient presents with   • Breast Pain     right side       Subjective   Ms. Lee is a 69 y.o. female with a history of hormonal replacement for menopause who presents to [facility] with redness and pain of her right breast which started on . She had fever at the time as well. No discharge or extension into axilla. She was seen by OB/GYN in clinic and started on dicloxacillin. Despite 3 days of therapy she has not had improvement of her swelling and pain. She has continued to have fevers at home although they are improved with tylenol. No CP SOA NVD or dysuria. No other rashes and no open sores.    History of Present Illness    Past Medical History:   Diagnosis Date   • Arthritis    • Basal cell carcinoma     followed by Dermatology   • Endometriosis    • Fibrocystic breast    • History of colon polyps     followed by GI, Dr. Cuba   • Hyperlipidemia    • Menopausal symptoms     followed by Integrative Medicine, Dr. Elisa Chance   • Osteopenia     followed by Gynecology, Dr. Tequila Boyle   • Prediabetes    • Rectocele     s/p surgical repair at time of hysterctomy   • Recurrent sinusitis      Past Surgical History:   Procedure Laterality Date   • ABDOMINOPLASTY     • ADENOIDECTOMY     • BREAST SURGERY Left     LUMPECTOMY   • BREAST SURGERY Left     LUMPECTOMY   • BROW LIFT AND BLEPHAROPLASTY     • COLONOSCOPY  2013    TUBULAR ADENOMAS, TORT COLON,  DR. CUBA   • CYSTOCELE REPAIR  2017   • ENDOSCOPY     • HAND SURGERY     • HYSTERECTOMY     • LAPAROTOMY OOPHERECTOMY     • LARYNGOSCOPY      IRRITATION   DR. CORREA   • POSTPARTUM TUBAL LIGATION     • TONSILLECTOMY     • TUBAL ABDOMINAL LIGATION     • WISDOM TOOTH EXTRACTION       Family History   Problem  Relation Age of Onset   • Cancer Father         Skin   • Heart disease Father    • Stroke Father    • Cancer Sister    • Macular degeneration Sister    • Arthritis Sister    • Alcohol abuse Mother      Social History     Tobacco Use   • Smoking status: Never Smoker   • Smokeless tobacco: Never Used   Substance Use Topics   • Alcohol use: Yes     Comment: about once per month   • Drug use: No       (Not in a hospital admission)  Allergies:    Allergies   Allergen Reactions   • Sulfa Antibiotics Rash       Review of Systems   Constitutional: Positive for chills and fever.   HENT: Negative for sore throat and trouble swallowing.    Eyes: Negative for pain and visual disturbance.   Respiratory: Negative for cough, shortness of breath and wheezing.    Cardiovascular: Negative for chest pain, palpitations and leg swelling.   Gastrointestinal: Negative for constipation, diarrhea, nausea and vomiting.   Endocrine: Negative for cold intolerance.   Genitourinary: Negative for difficulty urinating and dysuria.   Musculoskeletal: Negative for neck pain and neck stiffness.   Skin: Positive for rash. Negative for pallor.   Allergic/Immunologic: Negative for environmental allergies and food allergies.   Neurological: Negative for seizures and syncope.   Hematological: Negative for adenopathy. Does not bruise/bleed easily.   Psychiatric/Behavioral: Negative for agitation and confusion.        Objective    Vital Signs  Temp:  [97 °F (36.1 °C)] 97 °F (36.1 °C)  Heart Rate:  [71-79] 77  Resp:  [18] 18  BP: (134-137)/(78-85) 135/85  SpO2:  [98 %-100 %] 99 %  on   ;   Device (Oxygen Therapy): room air  Body mass index is 19.91 kg/m².    Physical Exam   Constitutional: She is oriented to person, place, and time. She appears well-developed. No distress.   HENT:   Head: Atraumatic.   Nose: Nose normal.   Eyes: Conjunctivae and EOM are normal. Pupils are equal, round, and reactive to light.   Neck: Normal range of motion. Neck supple.    Cardiovascular: Normal rate, regular rhythm and intact distal pulses.   Pulmonary/Chest: Effort normal and breath sounds normal. She has no wheezes. She has no rales.   Abdominal: Soft. She exhibits no distension. There is no tenderness. There is no rebound and no guarding.   Musculoskeletal: Normal range of motion. She exhibits no edema.   Neurological: She is alert and oriented to person, place, and time.   Skin: Skin is warm and dry. She is not diaphoretic.   Right breast with erythema and induration mostly located in anterior breast in lower 2 quadrants. She has firmness and possible abscess. No drainage tract or nipple discharge. No LAD of right axilla on exam.   Psychiatric: She has a normal mood and affect. Her behavior is normal.   Nursing note and vitals reviewed.      Results Review:  I reviewed the patient's new clinical results.  I reviewed imaging, agree with interpretation.  I reviewed EKG/telemetry, sinus rhythm.  I reviewed prior records.    Lab Results (last 24 hours)     Procedure Component Value Units Date/Time    CBC & Differential [[id number]] Collected:  02/10/19 1203    Specimen:  Blood Updated:  02/10/19 1216    Narrative:       The following orders were created for panel order CBC & Differential.  Procedure                               Abnormality         Status                     ---------                               -----------         ------                     CBC Auto Differential[[id number]]        Abnormal            Final result                 Please view results for these tests on the individual orders.    Comprehensive Metabolic Panel [[id number]]  (Abnormal) Collected:  02/10/19 1203    Specimen:  Blood Updated:  02/10/19 1238     Glucose 95 mg/dL      BUN 17 mg/dL      Creatinine 0.75 mg/dL      Sodium 139 mmol/L      Potassium 4.3 mmol/L      Chloride 97 mmol/L      CO2 27.5 mmol/L      Calcium 10.7 mg/dL      Total Protein 8.6 g/dL      Albumin 4.40 g/dL      ALT (SGPT) 49  U/L      AST (SGOT) 27 U/L      Alkaline Phosphatase 76 U/L      Total Bilirubin 0.4 mg/dL      eGFR Non African Amer 77 mL/min/1.73      Globulin 4.2 gm/dL      A/G Ratio 1.0 g/dL      BUN/Creatinine Ratio 22.7     Anion Gap 14.5 mmol/L     Protime-INR [[id number]]  (Normal) Collected:  02/10/19 1203    Specimen:  Blood Updated:  02/10/19 1236     Protime 12.6 Seconds      INR 0.96    Blood Culture - Blood, Arm, Left [[id number]] Collected:  02/10/19 1203    Specimen:  Blood from Arm, Left Updated:  02/10/19 1212    CBC Auto Differential [[id number]]  (Abnormal) Collected:  02/10/19 1203    Specimen:  Blood Updated:  02/10/19 1216     WBC 10.45 10*3/mm3      RBC 4.35 10*6/mm3      Hemoglobin 13.4 g/dL      Hematocrit 40.4 %      MCV 92.9 fL      MCH 30.8 pg      MCHC 33.2 g/dL      RDW 13.2 %      RDW-SD 44.8 fl      MPV 10.0 fL      Platelets 321 10*3/mm3      Neutrophil % 70.9 %      Lymphocyte % 18.5 %      Monocyte % 7.0 %      Eosinophil % 2.5 %      Basophil % 0.9 %      Immature Grans % 0.2 %      Neutrophils, Absolute 7.42 10*3/mm3      Lymphocytes, Absolute 1.93 10*3/mm3      Monocytes, Absolute 0.73 10*3/mm3      Eosinophils, Absolute 0.26 10*3/mm3      Basophils, Absolute 0.09 10*3/mm3      Immature Grans, Absolute 0.02 10*3/mm3     Lactic Acid, Plasma [[id number]]  (Normal) Collected:  02/10/19 1204    Specimen:  Blood Updated:  02/10/19 1228     Lactate 0.8 mmol/L     Blood Culture - Blood, Arm, Left [[id number]] Collected:  02/10/19 1206    Specimen:  Blood from Arm, Left Updated:  02/10/19 1211    Urinalysis With Microscopic If Indicated (No Culture) - Urine, Clean Catch [[id number]]  (Abnormal) Collected:  02/10/19 1222    Specimen:  Urine, Clean Catch Updated:  02/10/19 1239     Color, UA Yellow     Appearance, UA Clear     pH, UA 6.5     Specific Gravity, UA 1.016     Glucose, UA Negative     Ketones, UA Negative     Bilirubin, UA Negative     Blood, UA Moderate (2+)     Protein, UA Negative     Leuk  Esterase, UA Negative     Nitrite, UA Negative     Urobilinogen, UA 0.2 E.U./dL    Urinalysis, Microscopic Only - Urine, Clean Catch [[id number]]  (Abnormal) Collected:  02/10/19 1222    Specimen:  Urine, Clean Catch Updated:  02/10/19 1239     RBC, UA 31-50 /HPF      WBC, UA 0-2 /HPF      Bacteria, UA None Seen /HPF      Squamous Epithelial Cells, UA 7-12 /HPF      Hyaline Casts, UA 0-2 /LPF      Methodology Automated Microscopy          CT Chest With Contrast    (Results Pending)     Assessment/Plan      Active Hospital Problems    Diagnosis Date Noted   • **Abscess of right breast unrelated to pregnancy or breastfeeding [N61.1] 02/10/2019      Resolved Hospital Problems   No resolved problems to display.     - Right Breast Cellulitis/Abscess: Cellulitis and possible abscess on exam with having failed outpatient oral therapy. Had fevers and chills as well. BCx obtained and she was given Vancomycin and Zosyn in the ER. Will continue these for empiric treatment. Ct chest. Consult Surgery.  - Will plan to continue hormonal replacement and probiotic.  - PPX: SCD  - Full Code    I discussed the patients findings and my recommendations with patient, family and consulting provider.    Brandon Hernandez MD  [facility]  02/10/19  2:23 PM

## 2019-02-10 NOTE — PROGRESS NOTES
"Vancomycin/Zosyn Pharmacy to Dose - Initial Consult    Per Dr. Hernandez  Indication: SSTI  Goal trough: 15-20 mcg/ml  Current consult stop date: 19    Relevant clinical data and objective history reviewed:  69 y.o. female 162.6 cm (64\") 52.6 kg (116 lb)    Antimicrobials:  Day 1 - Vancomycin: pharmacy to dose  Day 1 - Zosyn 3.375gm iv q8h    Vancomycin Dose History:  2/10: 1000 mg loading dose in ED at 1304    Renal:  Estimated Creatinine Clearance: 55.1 mL/min (by C-G formula based on SCr of 0.75 mg/dL).   Lab Results   Component Value Date    CREATININE 0.75 02/10/2019      Lab Results   Component Value Date    WBC 10.45 02/10/2019    WBC 6.00 10/23/2018    WBC 5.41 2017        Vitals:  Temp (24hrs), Av °F (36.1 °C), Min:97 °F (36.1 °C), Max:97 °F (36.1 °C)    Lab Results   Component Value Date    WBC 10.45 02/10/2019     Assessment/Plan:    1.  Start vancomycin 500 mg iv q12h tomorrow at 0500.  2.  Start Zosyn 3.375gm iv q8h today at 1800.  3.  Draw vancomycin trough prior to dose due  in the am.  4.  Will monitor and adjust dose per P&T policy.    Velma Fabian, Pharm.D., [facility]  2/10/2019 2:54 PM        "

## 2019-02-10 NOTE — CONSULTS
Date of consultation-2/10/2019    History of present illness  The patient is a 69-year-old white female who noted some redness and swelling to the right breast approximately 5 days ago in the afternoon.  By that evening, she had a low-grade fever and chills.  She saw her OB/GYN doctor the next day and was placed on antibiotics which unfortunately have not helped her.  She continues to have fever and chills along with myalgias.  She denies any trauma to the breast and has never had this happen before.  She does have a long history of fibrocystic change in the breast.  Her last mammograms were in November 2018.  The breast were described as extremely dense and there were some cysts that were noted in both breasts on an ultrasound obtained at the same time.  The patient does take hormone replacement therapy and she has had 2 benign left breast biopsies years ago.  Her family history is negative for breast cancer.    Review of Systems   Constitutional: Positive for chills and fever. Negative for activity change, appetite change and diaphoresis.   HENT: Negative for trouble swallowing.    Eyes: Negative for visual disturbance.   Respiratory: Negative for cough, chest tightness, shortness of breath and wheezing.    Cardiovascular: Negative for chest pain, palpitations and leg swelling.   Gastrointestinal: Negative for abdominal pain, diarrhea, nausea and vomiting.   Genitourinary: Negative for dysuria.   Musculoskeletal: Positive for myalgias (right breast with swelling). Negative for back pain.   Skin: Negative for rash.   Neurological: Negative for dizziness, speech difficulty and light-headedness.        Past history  Medical illnesses-the patient has a history of arthritis, osteopenia, and a rectocele.  She denies any diabetes mellitus or hypertension heart lung or kidney disease    Prior surgeries-the patient has had a cystocele repair, abdominoplasty, a brow lift and blepharoplasty, laparoscopic oophorectomy,  hysterectomy, a lateral tubal ligation, tonsils and adenoids, and left breast biopsy ×2    Meds on admission-calcium, estradiol, estriol cream, evening primrose oil, vitamin E, iodine, manganese, niacin, Valtrex    Allergies-sulfa    Physical examination    Gen.-thin white female in no acute distress  Vital signs-blood pressure 135/85, pulse 77, temperature 97.0  Heart-regular rate and rhythm without murmur  Lungs-clear to auscultation  Neck-no carotid bruits or thyromegaly  Lymphatics-the patient does have some slightly tender mobile palpable right axillary adenopathy  Right breast-visibly larger than the other side.  The patient has some erythema which is centrally located and perhaps a little more evident on the lateral side of the areola.  There is maybe some faint fluctuance in this location.  The remainder of the breast is quite dense without any obvious masses.   Left breast-there is no nipple areolar skin change, I do not see any erythema to the breast.  She again has very dense breast tissue but no distinct masses.  Abdomen-nondistended, active bowel sounds, there are multiple scars with no obvious hernia and I do not feel any masses.    Impression-right breast mastitis.  The etiology is not entirely clear.  The presentation would not be typical for inflammatory breast cancer.  She has already received Zosyn and vancomycin which should be good broad-spectrum coverage for her.    Plan-the patient is going to have a CT scan of the chest.  I would however like to order an ultrasound of the breast in the morning.  We could consider aspiration if this seems like a unilocular fluid collection.  If it is multilocular incision and drainage would probably be best.  If there is no abscess at all we will continue with antibiotics.

## 2019-02-11 ENCOUNTER — APPOINTMENT (OUTPATIENT)
Dept: ULTRASOUND IMAGING | Facility: HOSPITAL | Age: 69
End: 2019-02-11

## 2019-02-11 PROBLEM — N61.0 CELLULITIS OF RIGHT BREAST: Status: ACTIVE | Noted: 2019-02-10

## 2019-02-11 LAB
ANION GAP SERPL CALCULATED.3IONS-SCNC: 11.6 MMOL/L
BASOPHILS # BLD AUTO: 0.07 10*3/MM3 (ref 0–0.2)
BASOPHILS NFR BLD AUTO: 1 % (ref 0–1.5)
BUN BLD-MCNC: 16 MG/DL (ref 8–23)
BUN/CREAT SERPL: 19.5 (ref 7–25)
CALCIUM SPEC-SCNC: 9.2 MG/DL (ref 8.6–10.5)
CHLORIDE SERPL-SCNC: 104 MMOL/L (ref 98–107)
CO2 SERPL-SCNC: 24.4 MMOL/L (ref 22–29)
CREAT BLD-MCNC: 0.82 MG/DL (ref 0.57–1)
DEPRECATED RDW RBC AUTO: 45.1 FL (ref 37–54)
EOSINOPHIL # BLD AUTO: 0.4 10*3/MM3 (ref 0–0.7)
EOSINOPHIL NFR BLD AUTO: 5.5 % (ref 0.3–6.2)
ERYTHROCYTE [DISTWIDTH] IN BLOOD BY AUTOMATED COUNT: 13.3 % (ref 11.7–13)
GFR SERPL CREATININE-BSD FRML MDRD: 69 ML/MIN/1.73
GLUCOSE BLD-MCNC: 92 MG/DL (ref 65–99)
HCT VFR BLD AUTO: 35.5 % (ref 35.6–45.5)
HGB BLD-MCNC: 11.4 G/DL (ref 11.9–15.5)
IMM GRANULOCYTES # BLD AUTO: 0 10*3/MM3 (ref 0–0.03)
IMM GRANULOCYTES NFR BLD AUTO: 0 % (ref 0–0.5)
INR PPP: 1 (ref 0.9–1.1)
LYMPHOCYTES # BLD AUTO: 2.06 10*3/MM3 (ref 0.9–4.8)
LYMPHOCYTES NFR BLD AUTO: 28.2 % (ref 19.6–45.3)
MCH RBC QN AUTO: 29.8 PG (ref 26.9–32)
MCHC RBC AUTO-ENTMCNC: 32.1 G/DL (ref 32.4–36.3)
MCV RBC AUTO: 92.7 FL (ref 80.5–98.2)
MONOCYTES # BLD AUTO: 0.57 10*3/MM3 (ref 0.2–1.2)
MONOCYTES NFR BLD AUTO: 7.8 % (ref 5–12)
NEUTROPHILS # BLD AUTO: 4.21 10*3/MM3 (ref 1.9–8.1)
NEUTROPHILS NFR BLD AUTO: 57.5 % (ref 42.7–76)
PLATELET # BLD AUTO: 286 10*3/MM3 (ref 140–500)
PMV BLD AUTO: 9.7 FL (ref 6–12)
POTASSIUM BLD-SCNC: 4.2 MMOL/L (ref 3.5–5.2)
PROTHROMBIN TIME: 13 SECONDS (ref 11.7–14.2)
RBC # BLD AUTO: 3.83 10*6/MM3 (ref 3.9–5.2)
SODIUM BLD-SCNC: 140 MMOL/L (ref 136–145)
WBC NRBC COR # BLD: 7.31 10*3/MM3 (ref 4.5–10.7)

## 2019-02-11 PROCEDURE — 85025 COMPLETE CBC W/AUTO DIFF WBC: CPT | Performed by: INTERNAL MEDICINE

## 2019-02-11 PROCEDURE — 99231 SBSQ HOSP IP/OBS SF/LOW 25: CPT | Performed by: SURGERY

## 2019-02-11 PROCEDURE — 25010000002 PIPERACILLIN SOD-TAZOBACTAM PER 1 G: Performed by: INTERNAL MEDICINE

## 2019-02-11 PROCEDURE — 85610 PROTHROMBIN TIME: CPT | Performed by: INTERNAL MEDICINE

## 2019-02-11 PROCEDURE — 36415 COLL VENOUS BLD VENIPUNCTURE: CPT | Performed by: INTERNAL MEDICINE

## 2019-02-11 PROCEDURE — 80048 BASIC METABOLIC PNL TOTAL CA: CPT | Performed by: INTERNAL MEDICINE

## 2019-02-11 PROCEDURE — 25010000002 VANCOMYCIN PER 500 MG: Performed by: INTERNAL MEDICINE

## 2019-02-11 PROCEDURE — 76641 ULTRASOUND BREAST COMPLETE: CPT

## 2019-02-11 RX ADMIN — SODIUM CHLORIDE, PRESERVATIVE FREE 3 ML: 5 INJECTION INTRAVENOUS at 20:16

## 2019-02-11 RX ADMIN — TAZOBACTAM SODIUM AND PIPERACILLIN SODIUM 3.38 G: 375; 3 INJECTION, SOLUTION INTRAVENOUS at 01:59

## 2019-02-11 RX ADMIN — ACETAMINOPHEN 650 MG: 325 TABLET, FILM COATED ORAL at 20:22

## 2019-02-11 RX ADMIN — TAZOBACTAM SODIUM AND PIPERACILLIN SODIUM 3.38 G: 375; 3 INJECTION, SOLUTION INTRAVENOUS at 18:42

## 2019-02-11 RX ADMIN — TAZOBACTAM SODIUM AND PIPERACILLIN SODIUM 3.38 G: 375; 3 INJECTION, SOLUTION INTRAVENOUS at 11:23

## 2019-02-11 RX ADMIN — VANCOMYCIN HYDROCHLORIDE 500 MG: 500 INJECTION, POWDER, LYOPHILIZED, FOR SOLUTION INTRAVENOUS at 17:00

## 2019-02-11 RX ADMIN — VANCOMYCIN HYDROCHLORIDE 500 MG: 500 INJECTION, POWDER, LYOPHILIZED, FOR SOLUTION INTRAVENOUS at 06:06

## 2019-02-11 NOTE — PROGRESS NOTES
Name: Gayatri Lee ADMIT: 2/10/2019   : 1950  PCP: Narcisa Villafana MD    MRN: [id number] LOS: 1 days   AGE/SEX: 69 y.o. female  ROOM: [facility]   Subjective   No CP SOA NVD. Breast pain is present but more focal. No subjective fever overnight.    Objective   Vital Signs  Temp:  [97.1 °F (36.2 °C)-98.3 °F (36.8 °C)] 97.5 °F (36.4 °C)  Heart Rate:  [61-73] 73  Resp:  [18] 18  BP: ()/(59-85) 109/67  SpO2:  [96 %-99 %] 97 %  on   ;   Device (Oxygen Therapy): room air  Body mass index is 20.6 kg/m².    Physical Exam   Constitutional: She is oriented to person, place, and time. She appears well-developed. No distress.   HENT:   Head: Atraumatic.   Nose: Nose normal.   Eyes: Conjunctivae and EOM are normal.   Neck: Normal range of motion. Neck supple.   Cardiovascular: Normal rate, regular rhythm and intact distal pulses.   Pulmonary/Chest: Effort normal. She has no wheezes. She has no rales.   Abdominal: Soft. She exhibits no distension. There is no tenderness.   Musculoskeletal: Normal range of motion. She exhibits no edema.   Neurological: She is alert and oriented to person, place, and time.   Skin: Skin is warm and dry. Rash (right breast) noted. She is not diaphoretic.   Psychiatric: She has a normal mood and affect. Her behavior is normal.   Nursing note and vitals reviewed.      Results Review:       I reviewed the patient's new clinical results.     I reviewed imaging, agree with interpretation.    Results from last 7 days   Lab Units 19  0522 02/10/19  1203   WBC 10*3/mm3 7.31 10.45   HEMOGLOBIN g/dL 11.4* 13.4   PLATELETS 10*3/mm3 286 321     Results from last 7 days   Lab Units 19  0522 02/10/19  1203   SODIUM mmol/L 140 139   POTASSIUM mmol/L 4.2 4.3   CHLORIDE mmol/L 104 97*   CO2 mmol/L 24.4 27.5   BUN mg/dL 16 17   CREATININE mg/dL 0.82 0.75   GLUCOSE mg/dL 92 95   Estimated Creatinine Clearance: 55.6 mL/min (by C-G formula based on SCr of 0.82 mg/dL).  Results from  last 7 days   Lab Units 02/11/19  0522 02/10/19  1203   CALCIUM mg/dL 9.2 10.7*   ALBUMIN g/dL  --  4.40         oxyCODONE-acetaminophen 1 tablet Oral Once   piperacillin-tazobactam 3.375 g Intravenous Q8H   sodium chloride 3 mL Intravenous Q12H   vancomycin 500 mg Intravenous Q12H       Pharmacy to dose vancomycin    Pharmacy to Dose Zosyn    Diet Regular      Assessment/Plan      Active Hospital Problems    Diagnosis Date Noted   • **Cellulitis of right breast [N61.0] 02/10/2019      Resolved Hospital Problems   No resolved problems to display.     - Right Breast Cellulitis: No abscess on CT. US planned. Continue Vancomycin/Zosyn. To be determined if will need procedure. Breast Surgery following.  - Pain: She did not want opioid. Tylenol prn. Ok to give toradol if no procedure is planned.  - Disposition: Home/possibly tomorrow    Brandon Hernandez MD  [facility]  02/11/19  1:35 PM

## 2019-02-11 NOTE — PLAN OF CARE
Problem: Patient Care Overview  Goal: Plan of Care Review  Outcome: Ongoing (interventions implemented as appropriate)   02/11/19 1910   Coping/Psychosocial   Plan of Care Reviewed With patient   OTHER   Outcome Summary pt had ultrasound today of right breast, Dr Denny aware of ultrasound results, no fever today, up ad maria an[place]ia and walking a lot, refusing pain medication, iv abx given as ordered, right breast red/firm/tender without drainage   Plan of Care Review   Progress improving     Goal: Individualization and Mutuality  Outcome: Ongoing (interventions implemented as appropriate)    Goal: Discharge Needs Assessment  Outcome: Ongoing (interventions implemented as appropriate)    Goal: Interprofessional Rounds/Family Conf  Outcome: Ongoing (interventions implemented as appropriate)      Problem: Pain, Acute (Adult)  Goal: Acceptable Pain Control/Comfort Level  Outcome: Ongoing (interventions implemented as appropriate)      Problem: Infection, Risk/Actual (Adult)  Goal: Identify Related Risk Factors and Signs and Symptoms  Outcome: Outcome(s) achieved Date Met: 02/11/19    Goal: Infection Prevention/Resolution  Outcome: Ongoing (interventions implemented as appropriate)

## 2019-02-11 NOTE — PLAN OF CARE
Problem: Patient Care Overview  Goal: Plan of Care Review  Outcome: Ongoing (interventions implemented as appropriate)   02/11/19 0414   Coping/Psychosocial   Plan of Care Reviewed With patient   OTHER   Outcome Summary vss. right breast with localized tenderness and redness. to continue on IV antibiotics. no pain meds given. labs ordered this am   Plan of Care Review   Progress no change     Goal: Individualization and Mutuality  Outcome: Ongoing (interventions implemented as appropriate)    Goal: Discharge Needs Assessment  Outcome: Ongoing (interventions implemented as appropriate)    Goal: Interprofessional Rounds/Family Conf  Outcome: Ongoing (interventions implemented as appropriate)      Problem: Pain, Acute (Adult)  Goal: Identify Related Risk Factors and Signs and Symptoms  Outcome: Outcome(s) achieved Date Met: 02/11/19    Goal: Acceptable Pain Control/Comfort Level  Outcome: Ongoing (interventions implemented as appropriate)

## 2019-02-11 NOTE — PROGRESS NOTES
"DAILY PROGRESS NOTE    Patient Identification:  Name: Gayatri Lee  Age: 69 y.o.  Sex: female  :  1950  MRN: [id number]           Date:     2019     Follow up for probable right breast mastitis    Subjective:    Interval History: The patient has done fairly well overnight.  She denies any fever or chills.  The pain seems to be a bit more localized to the central portion of that right breast.  I reviewed the CT scan with the radiologist yesterday.  There was no obvious fluid collection and the breast was clearly larger than the other one with some thickened skin.    Objective:    Scheduled Meds:  oxyCODONE-acetaminophen 1 tablet Oral Once   piperacillin-tazobactam 3.375 g Intravenous Q8H   sodium chloride 3 mL Intravenous Q12H   vancomycin 500 mg Intravenous Q12H     Continuous Infusions:  Pharmacy to dose vancomycin    Pharmacy to Dose Zosyn      PRN Meds:•  acetaminophen  •  HYDROcodone-acetaminophen  •  ondansetron **OR** ondansetron ODT **OR** ondansetron  •  Pharmacy to dose vancomycin  •  Pharmacy to Dose Zosyn  •  sennosides-docusate sodium  •  [COMPLETED] Insert peripheral IV **AND** sodium chloride  •  sodium chloride    Vital signs in last 24 hours:  Temp:  [97 °F (36.1 °C)-98.3 °F (36.8 °C)] 98 °F (36.7 °C)  Heart Rate:  [61-79] 61  Resp:  [18] 18  BP: ()/(59-85) 103/61    Intake/Output:    Intake/Output Summary (Last 24 hours) at 2019 0851  Last data filed at 2019 0716  Gross per 24 hour   Intake 770 ml   Output 2400 ml   Net -1630 ml       Physical Examination:   /61 (BP Location: Right arm, Patient Position: Lying)   Pulse 61   Temp 98 °F (36.7 °C) (Oral)   Resp 18   Ht 162.6 cm (64\")   Wt 54.4 kg (120 lb)   SpO2 97%   BMI 20.60 kg/m²     She  is alert patient is in no distress.    Breast exam-  the right breast continues to be visibly larger than the other.  There is a firm raised area involving most of the central breast which is tender.  There is some " overlying redness centrally which has not shown any tendency to extend further since admission.    Extremity examination is unremarkable.  Speech and memory are normal.        Data Review:  All labs (24hrs):   Recent Results (from the past 24 hour(s))   Light Blue Top    Collection Time: 02/10/19 12:03 PM   Result Value Ref Range    Extra Tube hold for add-on    Green Top (Gel)    Collection Time: 02/10/19 12:03 PM   Result Value Ref Range    Extra Tube Hold for add-ons.    Lavender Top    Collection Time: 02/10/19 12:03 PM   Result Value Ref Range    Extra Tube hold for add-on    Gold Top - SST    Collection Time: 02/10/19 12:03 PM   Result Value Ref Range    Extra Tube Hold for add-ons.    Comprehensive Metabolic Panel    Collection Time: 02/10/19 12:03 PM   Result Value Ref Range    Glucose 95 65 - 99 mg/dL    BUN 17 8 - 23 mg/dL    Creatinine 0.75 0.57 - 1.00 mg/dL    Sodium 139 136 - 145 mmol/L    Potassium 4.3 3.5 - 5.2 mmol/L    Chloride 97 (L) 98 - 107 mmol/L    CO2 27.5 22.0 - 29.0 mmol/L    Calcium 10.7 (H) 8.6 - 10.5 mg/dL    Total Protein 8.6 (H) 6.0 - 8.5 g/dL    Albumin 4.40 3.50 - 5.20 g/dL    ALT (SGPT) 49 (H) 1 - 33 U/L    AST (SGOT) 27 1 - 32 U/L    Alkaline Phosphatase 76 39 - 117 U/L    Total Bilirubin 0.4 0.1 - 1.2 mg/dL    eGFR Non African Amer 77 >60 mL/min/1.73    Globulin 4.2 gm/dL    A/G Ratio 1.0 g/dL    BUN/Creatinine Ratio 22.7 7.0 - 25.0    Anion Gap 14.5 mmol/L   Protime-INR    Collection Time: 02/10/19 12:03 PM   Result Value Ref Range    Protime 12.6 11.7 - 14.2 Seconds    INR 0.96 0.90 - 1.10   Blood Culture - Blood, Arm, Left    Collection Time: 02/10/19 12:03 PM   Result Value Ref Range    Blood Culture No growth at less than 24 hours    CBC Auto Differential    Collection Time: 02/10/19 12:03 PM   Result Value Ref Range    WBC 10.45 4.50 - 10.70 10*3/mm3    RBC 4.35 3.90 - 5.20 10*6/mm3    Hemoglobin 13.4 11.9 - 15.5 g/dL    Hematocrit 40.4 35.6 - 45.5 %    MCV 92.9 80.5 - 98.2  fL    MCH 30.8 26.9 - 32.0 pg    MCHC 33.2 32.4 - 36.3 g/dL    RDW 13.2 (H) 11.7 - 13.0 %    RDW-SD 44.8 37.0 - 54.0 fl    MPV 10.0 6.0 - 12.0 fL    Platelets 321 140 - 500 10*3/mm3    Neutrophil % 70.9 42.7 - 76.0 %    Lymphocyte % 18.5 (L) 19.6 - 45.3 %    Monocyte % 7.0 5.0 - 12.0 %    Eosinophil % 2.5 0.3 - 6.2 %    Basophil % 0.9 0.0 - 1.5 %    Immature Grans % 0.2 0.0 - 0.5 %    Neutrophils, Absolute 7.42 1.90 - 8.10 10*3/mm3    Lymphocytes, Absolute 1.93 0.90 - 4.80 10*3/mm3    Monocytes, Absolute 0.73 0.20 - 1.20 10*3/mm3    Eosinophils, Absolute 0.26 0.00 - 0.70 10*3/mm3    Basophils, Absolute 0.09 0.00 - 0.20 10*3/mm3    Immature Grans, Absolute 0.02 0.00 - 0.03 10*3/mm3   Lactic Acid, Plasma    Collection Time: 02/10/19 12:04 PM   Result Value Ref Range    Lactate 0.8 0.5 - 2.0 mmol/L   Blood Culture - Blood, Arm, Left    Collection Time: 02/10/19 12:06 PM   Result Value Ref Range    Blood Culture No growth at less than 24 hours    Urinalysis With Microscopic If Indicated (No Culture) - Urine, Clean Catch    Collection Time: 02/10/19 12:22 PM   Result Value Ref Range    Color, UA Yellow Yellow, Straw    Appearance, UA Clear Clear    pH, UA 6.5 5.0 - 8.0    Specific Gravity, UA 1.016 1.005 - 1.030    Glucose, UA Negative Negative    Ketones, UA Negative Negative    Bilirubin, UA Negative Negative    Blood, UA Moderate (2+) (A) Negative    Protein, UA Negative Negative    Leuk Esterase, UA Negative Negative    Nitrite, UA Negative Negative    Urobilinogen, UA 0.2 E.U./dL 0.2 - 1.0 E.U./dL   Urinalysis, Microscopic Only - Urine, Clean Catch    Collection Time: 02/10/19 12:22 PM   Result Value Ref Range    RBC, UA 31-50 (A) None Seen, 0-2 /HPF    WBC, UA 0-2 None Seen, 0-2 /HPF    Bacteria, UA None Seen None Seen /HPF    Squamous Epithelial Cells, UA 7-12 (A) None Seen, 0-2 /HPF    Hyaline Casts, UA 0-2 None Seen /LPF    Methodology Automated Microscopy    CBC Auto Differential    Collection Time: 02/11/19   5:22 AM   Result Value Ref Range    WBC 7.31 4.50 - 10.70 10*3/mm3    RBC 3.83 (L) 3.90 - 5.20 10*6/mm3    Hemoglobin 11.4 (L) 11.9 - 15.5 g/dL    Hematocrit 35.5 (L) 35.6 - 45.5 %    MCV 92.7 80.5 - 98.2 fL    MCH 29.8 26.9 - 32.0 pg    MCHC 32.1 (L) 32.4 - 36.3 g/dL    RDW 13.3 (H) 11.7 - 13.0 %    RDW-SD 45.1 37.0 - 54.0 fl    MPV 9.7 6.0 - 12.0 fL    Platelets 286 140 - 500 10*3/mm3    Neutrophil % 57.5 42.7 - 76.0 %    Lymphocyte % 28.2 19.6 - 45.3 %    Monocyte % 7.8 5.0 - 12.0 %    Eosinophil % 5.5 0.3 - 6.2 %    Basophil % 1.0 0.0 - 1.5 %    Immature Grans % 0.0 0.0 - 0.5 %    Neutrophils, Absolute 4.21 1.90 - 8.10 10*3/mm3    Lymphocytes, Absolute 2.06 0.90 - 4.80 10*3/mm3    Monocytes, Absolute 0.57 0.20 - 1.20 10*3/mm3    Eosinophils, Absolute 0.40 0.00 - 0.70 10*3/mm3    Basophils, Absolute 0.07 0.00 - 0.20 10*3/mm3    Immature Grans, Absolute 0.00 0.00 - 0.03 10*3/mm3   Basic Metabolic Panel    Collection Time: 02/11/19  5:22 AM   Result Value Ref Range    Glucose 92 65 - 99 mg/dL    BUN 16 8 - 23 mg/dL    Creatinine 0.82 0.57 - 1.00 mg/dL    Sodium 140 136 - 145 mmol/L    Potassium 4.2 3.5 - 5.2 mmol/L    Chloride 104 98 - 107 mmol/L    CO2 24.4 22.0 - 29.0 mmol/L    Calcium 9.2 8.6 - 10.5 mg/dL    eGFR Non African Amer 69 >60 mL/min/1.73    BUN/Creatinine Ratio 19.5 7.0 - 25.0    Anion Gap 11.6 mmol/L   Protime-INR    Collection Time: 02/11/19  5:22 AM   Result Value Ref Range    Protime 13.0 11.7 - 14.2 Seconds    INR 1.00 0.90 - 1.10           Assessment:    Abscess of right breast unrelated to pregnancy or breastfeeding    The patient remains on broad-spectrum antibiotics.  She has had no fever and her white count is normal this morning.  She does have an ultrasound which should be done this morning.  If she has a fluid collection that is multiloculated she may need to go to surgery to have this drained.  If it is unilocular we could consider aspiration.  The other concern would be that this does  not represent mastitis but perhaps inflammatory breast cancer.  The history according to the patient would certainly does not suggest this possibility.    Plan:  We will await the results of her ultrasound before deciding whether she needs to go to surgery later today or not.    Daniel Denny MD  2/11/2019

## 2019-02-12 VITALS
RESPIRATION RATE: 16 BRPM | WEIGHT: 120 LBS | DIASTOLIC BLOOD PRESSURE: 57 MMHG | HEIGHT: 64 IN | SYSTOLIC BLOOD PRESSURE: 97 MMHG | OXYGEN SATURATION: 96 % | HEART RATE: 59 BPM | BODY MASS INDEX: 20.49 KG/M2 | TEMPERATURE: 98.5 F

## 2019-02-12 LAB
DEPRECATED RDW RBC AUTO: 43.4 FL (ref 37–54)
ERYTHROCYTE [DISTWIDTH] IN BLOOD BY AUTOMATED COUNT: 12.8 % (ref 12.3–15.4)
HCT VFR BLD AUTO: 34.8 % (ref 34–46.6)
HGB BLD-MCNC: 10.9 G/DL (ref 12–15.9)
MCH RBC QN AUTO: 28.9 PG (ref 26.6–33)
MCHC RBC AUTO-ENTMCNC: 31.3 G/DL (ref 31.5–35.7)
MCV RBC AUTO: 92.3 FL (ref 79–97)
PLATELET # BLD AUTO: 281 10*3/MM3 (ref 140–450)
PMV BLD AUTO: 9.8 FL (ref 6–12)
RBC # BLD AUTO: 3.77 10*6/MM3 (ref 3.77–5.28)
VANCOMYCIN TROUGH SERPL-MCNC: 7.1 MCG/ML (ref 5–20)
WBC NRBC COR # BLD: 6.35 10*3/MM3 (ref 3.4–10.8)

## 2019-02-12 PROCEDURE — 80202 ASSAY OF VANCOMYCIN: CPT | Performed by: INTERNAL MEDICINE

## 2019-02-12 PROCEDURE — 36415 COLL VENOUS BLD VENIPUNCTURE: CPT | Performed by: INTERNAL MEDICINE

## 2019-02-12 PROCEDURE — 99231 SBSQ HOSP IP/OBS SF/LOW 25: CPT | Performed by: SURGERY

## 2019-02-12 PROCEDURE — 25010000002 VANCOMYCIN PER 500 MG: Performed by: INTERNAL MEDICINE

## 2019-02-12 PROCEDURE — 85027 COMPLETE CBC AUTOMATED: CPT | Performed by: INTERNAL MEDICINE

## 2019-02-12 PROCEDURE — 25010000002 PIPERACILLIN SOD-TAZOBACTAM PER 1 G: Performed by: INTERNAL MEDICINE

## 2019-02-12 RX ORDER — DOXYCYCLINE HYCLATE 100 MG/1
100 CAPSULE ORAL 2 TIMES DAILY
Qty: 14 CAPSULE | Refills: 0 | Status: SHIPPED | OUTPATIENT
Start: 2019-02-12 | End: 2019-02-19

## 2019-02-12 RX ORDER — CEPHALEXIN 500 MG/1
500 CAPSULE ORAL 3 TIMES DAILY
Qty: 21 CAPSULE | Refills: 0 | Status: SHIPPED | OUTPATIENT
Start: 2019-02-12 | End: 2019-02-19

## 2019-02-12 RX ORDER — ACETAMINOPHEN 325 MG/1
650 TABLET ORAL EVERY 4 HOURS PRN
Start: 2019-02-12 | End: 2019-05-08

## 2019-02-12 RX ADMIN — TAZOBACTAM SODIUM AND PIPERACILLIN SODIUM 3.38 G: 375; 3 INJECTION, SOLUTION INTRAVENOUS at 09:26

## 2019-02-12 RX ADMIN — VANCOMYCIN HYDROCHLORIDE 500 MG: 500 INJECTION, POWDER, LYOPHILIZED, FOR SOLUTION INTRAVENOUS at 06:03

## 2019-02-12 RX ADMIN — TAZOBACTAM SODIUM AND PIPERACILLIN SODIUM 3.38 G: 375; 3 INJECTION, SOLUTION INTRAVENOUS at 01:50

## 2019-02-12 RX ADMIN — SODIUM CHLORIDE, PRESERVATIVE FREE 3 ML: 5 INJECTION INTRAVENOUS at 09:26

## 2019-02-12 NOTE — PROGRESS NOTES
"DAILY PROGRESS NOTE    Patient Identification:  Name: Gayatri Lee  Age: 69 y.o.  Sex: female  :  1950  MRN: [id number]           Date:     2019     Follow up for mastitis of the right breast.    Subjective:    Interval History: She has done well overnight.  The patient feels that the swelling is decreasing a little bit everyday.  She does have some sharp shooting pains as well.  I did review her ultrasound with the radiologist.  She does not have a discrete fluid collection to drain but does have changes that would be consistent with mastitis.    Objective:    Scheduled Meds:  oxyCODONE-acetaminophen 1 tablet Oral Once   piperacillin-tazobactam 3.375 g Intravenous Q8H   sodium chloride 3 mL Intravenous Q12H   vancomycin 750 mg Intravenous Q12H     Continuous Infusions:  Pharmacy to dose vancomycin    Pharmacy to Dose Zosyn      PRN Meds:•  acetaminophen  •  HYDROcodone-acetaminophen  •  ondansetron **OR** ondansetron ODT **OR** ondansetron  •  Pharmacy to dose vancomycin  •  Pharmacy to Dose Zosyn  •  sennosides-docusate sodium  •  [COMPLETED] Insert peripheral IV **AND** sodium chloride  •  sodium chloride    Vital signs in last 24 hours:  Temp:  [97 °F (36.1 °C)-98.5 °F (36.9 °C)] 98.5 °F (36.9 °C)  Heart Rate:  [59-73] 59  Resp:  [16-18] 16  BP: ()/(53-67) 97/57    Intake/Output:    Intake/Output Summary (Last 24 hours) at 2019 1110  Last data filed at 2019 0915  Gross per 24 hour   Intake 1850 ml   Output 3000 ml   Net -1150 ml       Physical Examination:   BP 97/57 (BP Location: Left arm, Patient Position: Lying)   Pulse 59   Temp 98.5 °F (36.9 °C) (Oral)   Resp 16   Ht 162.6 cm (64\")   Wt 54.4 kg (120 lb)   SpO2 96%   BMI 20.60 kg/m²     She  is alert patient is in no distress.  .  Breast exam-  she continues to have a firm fullness from 10:00 to 2:00.  There is some mild redness with this.  It is not particularly painful to palpation.    Extremity examination is " unremarkable.  Speech and memory are normal.        Data Review:  All labs (24hrs):   Recent Results (from the past 24 hour(s))   Vancomycin, Trough    Collection Time: 02/12/19  4:53 AM   Result Value Ref Range    Vancomycin Trough 7.10 5.00 - 20.00 mcg/mL   CBC (No Diff)    Collection Time: 02/12/19  4:53 AM   Result Value Ref Range    WBC 6.35 3.40 - 10.80 10*3/mm3    RBC 3.77 3.77 - 5.28 10*6/mm3    Hemoglobin 10.9 (L) 12.0 - 15.9 g/dL    Hematocrit 34.8 34.0 - 46.6 %    MCV 92.3 79.0 - 97.0 fL    MCH 28.9 26.6 - 33.0 pg    MCHC 31.3 (L) 31.5 - 35.7 g/dL    RDW 12.8 12.3 - 15.4 %    RDW-SD 43.4 37.0 - 54.0 fl    MPV 9.8 6.0 - 12.0 fL    Platelets 281 140 - 450 10*3/mm3           Assessment:    Cellulitis of right breast    I do think that she is slowly improving.  There is been no temperature elevation and her white blood cell count remains normal at 6000.  After reviewing the ultrasound with the radiologist I do not think there is a need to do a needle biopsy at this point in time.    Plan:  I would agree with her going home today.  She will need antibiotics and the patient has been instructed to begin taking probiotics as well.  I would like to see her back in the office in 1 week.  She will call me sooner if she redevelops fever or significant redness.    Daniel Denny MD  2/12/2019

## 2019-02-12 NOTE — PLAN OF CARE
Problem: Patient Care Overview  Goal: Plan of Care Review  Outcome: Ongoing (interventions implemented as appropriate)   02/12/19 0404   Coping/Psychosocial   Plan of Care Reviewed With patient   OTHER   Outcome Summary vss, rt breast still swollen and red, using heating pad for comfort, iv antibiotic given, for vanc trough at 0400, voiding freely.   Plan of Care Review   Progress improving     Goal: Individualization and Mutuality  Outcome: Ongoing (interventions implemented as appropriate)    Goal: Discharge Needs Assessment  Outcome: Ongoing (interventions implemented as appropriate)    Goal: Interprofessional Rounds/Family Conf  Outcome: Ongoing (interventions implemented as appropriate)      Problem: Pain, Acute (Adult)  Goal: Acceptable Pain Control/Comfort Level  Outcome: Ongoing (interventions implemented as appropriate)      Problem: Infection, Risk/Actual (Adult)  Goal: Infection Prevention/Resolution  Outcome: Ongoing (interventions implemented as appropriate)

## 2019-02-12 NOTE — PROGRESS NOTES
"Pharmacokinetic Evaluation - Vancomycin/zosyn    Gayatri Lee is a 69 y.o. female on vancomycin pharmacy to dose.  MRN: [id number]  : 1950    Day of vancomycin/zosyn therapy: 2  Indication: SSTI  Consulted by: Dr. Hernandez  Goal trough: 15-20mcg/mL  Current stop date: 19     Current vancomycin dose: 500mg q12h  Current zosyn dose: 3.375g q8h EI    Blood pressure 97/57, pulse 59, temperature 98.5 °F (36.9 °C), temperature source Oral, resp. rate 16, height 162.6 cm (64\"), weight 54.4 kg (120 lb), SpO2 96 %.  Results from last 7 days   Lab Units 19  0522 02/10/19  1203   CREATININE mg/dL 0.82 0.75     Estimated Creatinine Clearance: 55.6 mL/min (by C-G formula based on SCr of 0.82 mg/dL).  Results from last 7 days   Lab Units 19  0453 19  0522 02/10/19  1203   WBC 10*3/mm3 6.35 7.31 10.45   HEMOGLOBIN g/dL 10.9* 11.4* 13.4   HEMATOCRIT % 34.8 35.5* 40.4   PLATELETS 10*3/mm3 281 286 321       Intake/Output Summary (Last 24 hours) at 2019 0905  Last data filed at 2019 0603  Gross per 24 hour   Intake 1610 ml   Output 2100 ml   Net -490 ml         Radiology: 2/10 CT chest  Impression:     1. Enlarged right breast with diffuse increased density and overlying  skin thickening. Findings could be related to acute inflammatory  process/phlegmon versus neoplasm. A loculated fluid collection is not  seen.  2. Findings were discussed with Osman Alfaro and Denisha.     Radiation dose reduction techniques were utilized, including automated  exposure control and exposure modulation based on body size.     This report was finalized on 2/10/2019 4:23 PM by Dr. Sammy Lassiter M.D.         Cultures:   2/10 bcx: NG24 hrs     Dosing hx (include troughs if drawn):  Vancomycin 1000mg loading dose              2/10 1304  Vancomycin 500mg q12h               0606,1700     @ 0453 trough = 7.1 mcg/mL   603      Assessment:  Level is sub-therapeutic; drawn prior to steady state.  Renal " function is stable. Good UOP. Will plan to increase to 750mg q12h with next dose. With the increase and accumulation I expect it to be within goal range of 10-20mcg/mL for cellulitis    Plan:  1) Increase vancomycin 750 mg every 12 hours.  2) No levels order at this time due to likely short duration  3) Monitor for Uop and scr.    Abhi Boyle, [facility]

## 2019-02-12 NOTE — PLAN OF CARE
Problem: Patient Care Overview  Goal: Plan of Care Review  Outcome: Ongoing (interventions implemented as appropriate)   02/12/19 1142   Coping/Psychosocial   Plan of Care Reviewed With patient   OTHER   Outcome Summary VSS, pt c/o minimal R breast pain, pt states it is improving c/ heating pad, denies need for meds, labs better, plan to DC on antx & follow-up c/ breast sx in 1 week, pt agrees c/ plan   Plan of Care Review   Progress improving       Problem: Pain, Acute (Adult)  Goal: Acceptable Pain Control/Comfort Level  Outcome: Ongoing (interventions implemented as appropriate)      Problem: Infection, Risk/Actual (Adult)  Goal: Infection Prevention/Resolution  Outcome: Ongoing (interventions implemented as appropriate)

## 2019-02-12 NOTE — DISCHARGE SUMMARY
NAME: Gayatri Lee ADMIT: 2/10/2019   : 1950  PCP: Narcisa Villafana MD    MRN: [id number] LOS: 2 days   AGE/SEX: 69 y.o. female  ROOM: P695/1     Date of Admission:  2/10/2019  Date of Discharge:  2019    PCP: Narcisa Villafana MD    CHIEF COMPLAINT  Breast Pain (right side)      DISCHARGE DIAGNOSIS  Active Hospital Problems    Diagnosis Date Noted   • **Cellulitis of right breast [N61.0] 02/10/2019      Resolved Hospital Problems   No resolved problems to display.       SECONDARY DIAGNOSES  Past Medical History:   Diagnosis Date   • Arthritis    • Basal cell carcinoma     followed by Dermatology   • Endometriosis    • Fibrocystic breast    • History of colon polyps     followed by GI, Dr. Cuba   • Hyperlipidemia    • Menopausal symptoms     followed by Integrative Medicine, Dr. Elisa Chance   • Osteopenia     followed by Gynecology, Dr. Tequila Boyle   • Prediabetes    • Rectocele     s/p surgical repair at time of hysterctomy   • Recurrent sinusitis        CONSULTS   Dr. Dneny- Breast Surgery    HOSPITAL COURSE  Ms. Lee is a pleasant 69-year-old lady who was admitted with acute onset and worsening of right breast pain, swelling, erythema.  She was admitted after failing outpatient therapy with 3 days of dicloxacillin.  She had fevers prior to admission despite this so blood cultures were obtained and she was started on broad-spectrum IV antibiotics.  CT of her chest did inflammatory changes/phlegmon versus neoplasm.  Her history is consistent with acute infectious process.  Ultrasound was obtained which did not show abscess and did show dilated ducts and some lymphadenopathy.  Breast surgery was consulted. With the U/S not showing any abscess or cancer will plan on finishing course of abx- will send out on po doxy and keflex. She will follow up with Dr. Denny closely in the office to monitor response to treatment and to make sure doesn't end up needing any  biopsy.     DIAGNOSTICS    US Breast Right Complete [[id number]] Sunny as Reviewed   Order Status: Completed Collected: 02/11/19 1104    Updated: 02/11/19 1413   Narrative:     US BREAST RIGHT COMPLETE-     HISTORY: Evaluate right breast for abscess, mastitis.     ULTRASOUND FINDINGS: No focal complex fluid collection identified  consistent with abscess. At 11 o'clock, 12 o'clock, 1 o'clock and 2  o'clock, there is an area of architectural distortion and alteration in  the parenchymal echogenicity with innumerable tubular areas throughout,  consistent with edema and dilated ducts. In addition there is axillary  lymphadenopathy the largest lymph node measuring 4 cm in length.     CONCLUSION: The findings are most compatible with edema/cellulitis and  dilated ducts at 11 o'clock, 12 o'clock, 1 o'clock and 2 o'clock. No  abscess identified. There is axillary adenopathy. Benign BI-RADS  Category 2.     BI-RADS CATEGORY 2: Benign findings.     This report was finalized on 2/11/2019 2:10 PM by Dr. Jared Hodges M.D.      CT Chest With Contrast [[id number]] Sunny as Reviewed   Order Status: Completed Collected: 02/10/19 1452    Updated: 02/10/19 1627   Narrative:     CT OF THE CHEST WITH CONTRAST 02/10/2019     HISTORY: Right breast mass.     TECHNIQUE: Spiral images were obtained from the lung apices to the upper  abdomen after intravenous contrast.     FINDINGS: There is dense parenchymal breast tissue bilaterally and the  right breast is rather markedly enlarged compared to the left and  appears nearly twice the size of the left breast. There is skin  thickening of the right breast. No loculated fluid collections are seen.  There is almost a masslike appearance of the parenchyma in the mid to  lower right breast. Left breast also demonstrates some dense tissue with  no discrete mass. A few shotty right axillary nodes are seen. No lung  masses or significant pulmonary infiltrates are seen. Shotty mediastinal  nodes are  seen.      Impression:     1. Enlarged right breast with diffuse increased density and overlying  skin thickening. Findings could be related to acute inflammatory  process/phlegmon versus neoplasm. A loculated fluid collection is not  seen.  2. Findings were discussed with Osman Alfaro and Denisha.     02/12/2019 0453  02/12/2019 0516  CBC (No Diff) [[id number]]   (Abnormal)   Blood     Final result  WBC 6.35 10*3/mm3   RBC 3.77 10*6/mm3   Hemoglobin 10.9 Abnormally low  g/dL   Hematocrit 34.8 %   MCV 92.3 fL   MCH 28.9 pg   MCHC 31.3 Abnormally low  g/dL   RDW 12.8 %   RDW-SD 43.4 fl   MPV 9.8 fL   Platelets 281 10*3/mm3        02/11/2019 0522  02/11/2019 0612  Basic Metabolic Panel [[id number]]    Blood     Final result  Glucose 92 mg/dL   BUN 16 mg/dL   Creatinine 0.82 mg/dL   Sodium 140 mmol/L   Potassium 4.2 mmol/L   Chloride 104 mmol/L   CO2 24.4 mmol/L   Calcium 9.2 mg/dL   eGFR Non African Am 69 mL/min/1.73   BUN/Creatinine Ratio 19.5    Anion Gap 11.6 mmol/L            PHYSICAL EXAM  Objective     Alert  nad  No resp distress  Right breast with mild erythema, no drainage    CONDITION ON DISCHARGE  Stable.      DISCHARGE DISPOSITION   Home or Self Care      DISCHARGE MEDICATIONS       Your medication list      START taking these medications      Instructions Last Dose Given Next Dose Due   acetaminophen 325 MG tablet  Commonly known as:  TYLENOL      Take 2 tablets by mouth Every 4 (Four) Hours As Needed for Mild Pain .       cephalexin 500 MG capsule  Commonly known as:  KEFLEX      Take 1 capsule by mouth 3 (Three) Times a Day for 7 days. Do not take vitamins/supplements within 2 hours of abx       doxycycline 100 MG capsule  Commonly known as:  VIBRAMYCIN      Take 1 capsule by mouth 2 (Two) Times a Day for 7 days. Do not take vitamins/supplements within 2 hours of abx          CONTINUE taking these medications      Instructions Last Dose Given Next Dose Due   CALCIUM 600 PO      Take 900 mg by mouth. With  150mg magnesium       Diindolylmethane powder      150 mg. Tumeric 250mg, black pepper 2.5mg,       estradiol 0.025 MG/24HR patch  Commonly known as:  VIVELLE-DOT           Estriol 10 % cream           EVENING PRIMROSE OIL PO      Take 3,900 mg by mouth.       Folic Acid 20 MG capsule      Take 1,600 mcg by mouth.       IODINE STRONG PO      Take 1,200 mg by mouth.       IRON COMPLEX PO      Take 20 mg by mouth Daily.       MANGANESE PO      Take 24 mg by mouth Daily.       NON FORMULARY      maqui berry extract 60mg daily for dry eye       PROBIOTIC ADVANCED PO      Take  by mouth.       progesterone 100 MG capsule  Commonly known as:  PROMETRIUM      TK 1 C PO D       PYCNOGENOL PO      Take  by mouth. Omega 3 300mg, EPA 165mg, DHA 95mg       Ubiquinol 100 MG capsule      Take 100 mg by mouth.       vitamin A 45453 UNIT capsule      Take 3,750 Units by mouth Daily.       vitamin B-12 100 MCG tablet  Commonly known as:  CYANOCOBALAMIN      Take 58 mcg by mouth Daily.       VITAMIN B-3 PO      Take 100 mg by mouth.       vitamin B-6 50 MG tablet  Commonly known as:  PYRIDOXINE      Take 20 mg by mouth Daily.       vitamin C 250 MG tablet  Commonly known as:  ASCORBIC ACID      Take 135 mg by mouth Daily.       vitamin E 400 UNIT capsule      Take 200 Units by mouth Daily. Selenium 50mcg, lecithin 150mg          STOP taking these medications    diclofenac 1 % gel gel  Commonly known as:  VOLTAREN        KRILL OIL PO        valACYclovir 500 MG tablet  Commonly known as:  VALTREX              Where to Get Your Medications      These medications were sent to [facility] [address] [place] [address] [address] AT [facility]  - [phone] [facility] [phone] [facility] [address]    Phone:  [phone]   · cephalexin 500 MG capsule  · doxycycline 100 MG capsule     Information about where to get these medications is not yet available    Ask your nurse or doctor about  these medications  · acetaminophen 325 MG tablet        No future appointments.  Additional Instructions for the Follow-ups that You Need to Schedule     Discharge Follow-up with PCP   As directed       Currently Documented PCP:    Narcisa Villafana MD    PCP Phone Number:    [phone]     Follow Up Details:  2-3 weeks         Discharge Follow-up with Specified Provider: Dr. Denny; 1 Week   As directed      To:  Dr. Denny    Follow Up:  1 Week           Follow-up Information     Daniel Denny MD. Schedule an appointment as soon as possible for a visit in 1 week(s).    Specialty:  Breast Surgery  Contact information:  [address] [address] [phone]             Narcisa Villafana MD .    Specialty:  Family Medicine  Why:  2-3 weeks  Contact information:  [address] [facility] [address] [phone]                   TEST  RESULTS PENDING AT DISCHARGE   Order Current Status    Blood Culture - Blood, Arm, Left Preliminary result    Blood Culture - Blood, Arm, Left Preliminary result             Tom Castellano MD  [facility]  02/12/19  11:39 AM      Time: greater than 32 minutes on discharge  It was a pleasure taking care of this patient while in the hospital.

## 2019-02-13 NOTE — PROGRESS NOTES
Discharge Planning Assessment  [facility]     Patient Name: Gayatri Lee  MRN: [id number]  Today's Date: 2/13/2019    Admit Date: 2/10/2019    Discharge Needs Assessment    No documentation.       Discharge Plan     Row Name 02/13/19 0742       Plan    Final Discharge Disposition Code  01 - home or self-care       Ann Roque RN

## 2019-02-15 LAB
BACTERIA SPEC AEROBE CULT: NORMAL
BACTERIA SPEC AEROBE CULT: NORMAL

## 2019-02-19 ENCOUNTER — TELEPHONE (OUTPATIENT)
Dept: MAMMOGRAPHY | Facility: CLINIC | Age: 69
End: 2019-02-19

## 2019-02-19 NOTE — TELEPHONE ENCOUNTER
I called her, we will keep the same appointment and I do not think she needs additional antibiotics.

## 2019-02-19 NOTE — TELEPHONE ENCOUNTER
PT is calling for you. She said that she finished antibiotics today and the infection is still there and she is in pain. She is asking what she should do? She said when she called to make appt here she was told you only had Thursday and she didn't know if she should wait that long. Please call when you have time.

## 2019-02-21 ENCOUNTER — OFFICE VISIT (OUTPATIENT)
Dept: MAMMOGRAPHY | Facility: CLINIC | Age: 69
End: 2019-02-21

## 2019-02-21 VITALS
TEMPERATURE: 98 F | OXYGEN SATURATION: 97 % | BODY MASS INDEX: 20.14 KG/M2 | HEART RATE: 69 BPM | SYSTOLIC BLOOD PRESSURE: 120 MMHG | DIASTOLIC BLOOD PRESSURE: 60 MMHG | HEIGHT: 64 IN | WEIGHT: 118 LBS

## 2019-02-21 DIAGNOSIS — N61.0 MASTITIS OF RIGHT BREAST UNRELATED TO PREGNANCY OF BREASTFEEDING: Primary | ICD-10-CM

## 2019-02-21 PROCEDURE — 99213 OFFICE O/P EST LOW 20 MIN: CPT | Performed by: SURGERY

## 2019-02-21 NOTE — PROGRESS NOTES
Chief Complaint: Gayatri Lee is a 69 y.o.. female here today for No chief complaint on file.        History of Present Illness:  Patient presents with {ARHCHIEFCOMPLAINT:21209}.       Review of Systems:  Review of Systems   HENT:   Positive for hearing loss and tinnitus.    Genitourinary: Positive for nocturia.    Skin:        The patient has redness along left breast.    Neurological: Positive for light-headedness.   Psychiatric/Behavioral: Positive for sleep disturbance.   All other systems reviewed and are negative.       Past Medical and Surgical History:  Breast Biopsy History:  {ARHPMHBREASTBIOPSY:21210}  Breast Cancer HIstory:  {ARHPMHBREAST CANCER:21211}  Breast Operations, and year:  ***  Social History     Tobacco Use   Smoking Status Never Smoker   Smokeless Tobacco Never Used     Obstetric History:  {ARHPMHMENOPAUSALSTATUS:21212}   Number of pregnancies:***  Number of live births: ***  Number of abortions or miscarriages: ***  Age of delivery of first child: ***  {ARHPMHBREASTFEED:21213}  Length of time taking birth control pills:***  {ARHPMHHRT:21214}    Past Surgical History:   Procedure Laterality Date   • ABDOMINOPLASTY  2004   • ADENOIDECTOMY  1955   • BREAST SURGERY Left 1976    LUMPECTOMY   • BREAST SURGERY Left 2004    LUMPECTOMY   • BROW LIFT AND BLEPHAROPLASTY  2004   • COLONOSCOPY  06/03/2013    TUBULAR ADENOMAS, TORT COLON,  DR. CUBA   • CYSTOCELE REPAIR  05/2017   • ENDOSCOPY     • HAND SURGERY  1990   • HYSTERECTOMY     • LAPAROTOMY OOPHERECTOMY  1994   • LARYNGOSCOPY      IRRITATION   DR. CORREA   • POSTPARTUM TUBAL LIGATION  1980   • TONSILLECTOMY  1955   • TUBAL ABDOMINAL LIGATION     • WISDOM TOOTH EXTRACTION  1970       Past Medical History:   Diagnosis Date   • Arthritis    • Basal cell carcinoma     followed by Dermatology   • Endometriosis    • Fibrocystic breast    • History of colon polyps     followed by GI, Dr. Cuba   • Hyperlipidemia    • Menopausal symptoms      followed by Integrative Medicine, Dr. Elisa Chance   • Osteopenia     followed by Gynecology, Dr. Tequila Boyle   • Prediabetes    • Rectocele     s/p surgical repair at time of hysterctomy   • Recurrent sinusitis        Prior Hospitalizations, other than for surgery or childbirth, and year:  ***    Social History:  {ARHSOCHXMARITAL:21215}  {ARHSOCHXCHILDREN:21216}    Family History:  Family History   Problem Relation Age of Onset   • Cancer Father         Skin   • Heart disease Father    • Stroke Father    • Cancer Sister    • Macular degeneration Sister    • Arthritis Sister    • Alcohol abuse Mother        Vital Signs:  There were no vitals filed for this visit.    Medications:    Current Outpatient Prescriptions:     Current Outpatient Medications:   •  acetaminophen (TYLENOL) 325 MG tablet, Take 2 tablets by mouth Every 4 (Four) Hours As Needed for Mild Pain ., Disp: , Rfl:   •  Calcium Carbonate (CALCIUM 600 PO), Take 900 mg by mouth. With 150mg magnesium, Disp: , Rfl:   •  Diindolylmethane powder, 150 mg. Tumeric 250mg, black pepper 2.5mg,, Disp: , Rfl:   •  estradiol (VIVELLE-DOT) 0.025 MG/24HR patch, , Disp: , Rfl:   •  Estriol 10 % cream, , Disp: , Rfl:   •  EVENING PRIMROSE OIL PO, Take 3,900 mg by mouth., Disp: , Rfl:   •  Folic Acid 20 MG capsule, Take 1,600 mcg by mouth., Disp: , Rfl:   •  Iodine Strong, [place], (IODINE STRONG PO), Take 1,200 mg by mouth., Disp: , Rfl:   •  Iron Combinations (IRON COMPLEX PO), Take 20 mg by mouth Daily., Disp: , Rfl:   •  MANGANESE PO, Take 24 mg by mouth Daily., Disp: , Rfl:   •  Niacin (VITAMIN B-3 PO), Take 100 mg by mouth., Disp: , Rfl:   •  NON FORMULARY, maqui berry extract 60mg daily for dry eye, Disp: , Rfl:   •  Nutritional Supplements (PYCNOGENOL PO), Take  by mouth. Omega 3 300mg, EPA 165mg, DHA 95mg, Disp: , Rfl:   •  Probiotic Product (PROBIOTIC ADVANCED PO), Take  by mouth., Disp: , Rfl:   •  progesterone (PROMETRIUM) 100 MG capsule, TK 1 C PO D, Disp: ,  Rfl: 2  •  Ubiquinol 100 MG capsule, Take 100 mg by mouth., Disp: , Rfl:   •  vitamin A 78554 UNIT capsule, Take 3,750 Units by mouth Daily., Disp: , Rfl:   •  vitamin B-12 (CYANOCOBALAMIN) 100 MCG tablet, Take 58 mcg by mouth Daily., Disp: , Rfl:   •  vitamin B-6 (PYRIDOXINE) 50 MG tablet, Take 20 mg by mouth Daily., Disp: , Rfl:   •  vitamin C (ASCORBIC ACID) 250 MG tablet, Take 135 mg by mouth Daily., Disp: , Rfl:   •  vitamin E 400 UNIT capsule, Take 200 Units by mouth Daily. Selenium 50mcg, lecithin 150mg, Disp: , Rfl:     Physical Examination:  General Appearance:   Patient is in no distress.  She is well kept and has an {ARHPEBUILD:32835} build.   Psychiatric:  Patient with appropriate mood and affect. Alert and oriented to self, time, and place.    Breast, RIGHT:  {ARHPEBREASTSIZE:86176} sized, symmetric with the contralateral side.  Breast skin is without erythema, edema, rashes.  There are no visible abnormalities upon inspection during the arm-raising maneuver or with hands on hips in the sitting position. There is no nipple retraction, discharge or nipple/areolar skin changes.There are no masses palpable in the sitting or supine positions.    Breast, LEFT:  {ARHPEBREASTSIZE:86904} sized, symmetric with the contralateral side.  Breast skin is without erythema, edema, rashes.  There are no visible abnormalities upon inspection during the arm-raising maneuver or with hands on hips in the sitting position. There is no nipple retraction, discharge or nipple/areolar skin changes.There are no masses palpable in the sitting or supine positions.    Lymphatic:  There is no axillary, cervical, infraclavicular, or supraclavicular adenopathy bilaterally.  Eyes:  Pupils are round and reactive to light.  Cardiovascular:  Heart rate and rhythm are regular.  Respiratory:  Lungs are clear bilaterally with no crackles or wheezes in any lung field.  Gastrointestinal:  Abdomen is soft, nondistended, and nontender. ***There  are no scars from previous surgery.    Musculoskeletal:  Good strength in all 4 extremities.   {ARHPE range motion shoulder:71721}    Skin:  No new skin lesions or rashes on the skin excluding the breast (see breast exam above).    Assessment:  No diagnosis found.      Plan:  ***      CPT coding:    Next Appointment:  No Follow-up on file.            EMR Dragon/transcription disclaimer:    Much of this encounter note is an electronic transcription/translocation of spoken language to printed text.  The electronic translation of spoken language may permit erroneous, or at times, nonsensical words or phrases to be inadvertently transcribed.  Although I have reviewed the note from such areas, some may still exist.

## 2019-02-21 NOTE — PROGRESS NOTES
Sameer Lee is a 69 y.o. female     History of Present Illness she is a nice 69-year-old white female that I saw in the hospital about 2 weeks ago.  She presented with the abrupt onset of redness to the right breast which was not responding to oral antibiotics.  She was admitted to the hospital and begun on broad-spectrum antibiotics.  An ultrasound was obtained and it revealed innumerable tubular areas throughout the breast with edema and dilated ducts.  There was also an enlarged axillary lymph node.  These findings were felt to be most consistent with cellulitis and edema.  During the hospitalization she had no fever and her white count was normal.  He was eventually discharged on doxycycline and Keflex.  She has completed a 10-day course and has been off her antibiotics approximately 2 days.  She denies any fever or chills at home.  She does feel a little tired as her only symptom.  The patient has been applying heat to that right breast and although the redness seems better she still has the masslike area in the upper portion of the breast.    The patient has been on hormone replacement therapy for over a year.  She does not feel that the hormones because this area in the right breast to change dramatically.        Review of Systems constitutional-the patient does describe some fatigue but no chills    respiratory-no cough or shortness of breath  Past Medical History:   Diagnosis Date   • Arthritis    • Basal cell carcinoma     followed by Dermatology   • Endometriosis    • Fibrocystic breast    • History of colon polyps     followed by GI, Dr. Cuba   • Hyperlipidemia    • Menopausal symptoms     followed by Integrative Medicine, Dr. Elisa Chance   • Osteopenia     followed by Gynecology, Dr. Tequila Boyle   • Prediabetes    • Rectocele     s/p surgical repair at time of hysterctomy   • Recurrent sinusitis      Past Surgical History:   Procedure Laterality Date   • ABDOMINOPLASTY  2004   •  ADENOIDECTOMY  1955   • BREAST SURGERY Left 1976    LUMPECTOMY   • BREAST SURGERY Left 2004    LUMPECTOMY   • BROW LIFT AND BLEPHAROPLASTY  2004   • COLONOSCOPY  06/03/2013    TUBULAR ADENOMAS, TORT COLON,  DR. GOMEZ   • CYSTOCELE REPAIR  05/2017   • ENDOSCOPY     • HAND SURGERY  1990   • HYSTERECTOMY     • LAPAROTOMY OOPHERECTOMY  1994   • LARYNGOSCOPY      IRRITATION   DR. CORREA   • POSTPARTUM TUBAL LIGATION  1980   • TONSILLECTOMY  1955   • TUBAL ABDOMINAL LIGATION     • WISDOM TOOTH EXTRACTION  1970     Family History   Problem Relation Age of Onset   • Cancer Father         Skin   • Heart disease Father    • Stroke Father    • Cancer Sister    • Macular degeneration Sister    • Arthritis Sister    • Alcohol abuse Mother      Social History     Socioeconomic History   • Marital status:      Spouse name: Not on file   • Number of children: Not on file   • Years of education: Not on file   • Highest education level: Not on file   Social Needs   • Financial resource strain: Not on file   • Food insecurity - worry: Not on file   • Food insecurity - inability: Not on file   • Transportation needs - medical: Not on file   • Transportation needs - non-medical: Not on file   Occupational History   • Not on file   Tobacco Use   • Smoking status: Never Smoker   • Smokeless tobacco: Never Used   Substance and Sexual Activity   • Alcohol use: Yes     Comment: about once per month   • Drug use: No   • Sexual activity: Defer   Other Topics Concern   • Not on file   Social History Narrative    Lives at home in a place of her own.  Enjoys tap and ballroom dance.         Objective   Physical Exam  General-thin white female in no acute distress but she does seem to have a bit of a flat affect  Vital signs- blood pressure 120/60, pulse 69, temperature 98.0  Right breast- visibly larger than the left.  There is a masslike fullness involving the upper outer quadrant.  I do not see any peau d'orange and the skin does not  appear to be thickened.  The masslike area measures 10 x 10 cm.  There is some faint redness centrally but it is very subtle.  Left breast- small/medium sized which is definitely asymmetric from the other side.  I do not palpate any masses in the breast or see any redness to the skin.  Lymphatics-there is some fairly soft mobile right axillary adenopathy.  Assessment/Plan   Right breast mastitis-she does appear to be slowly improving.  She has no systemic signs of infection and I do think her physical exam is improving from the redness standpoint.  She still has the rather large masslike area which concerns me but according to the patient it has always been firm and more nodular in this area.  I would like to give her another 2-3 weeks to potentially heal from this mastitis.  If she does not, I think she would benefit from a needle biopsy of this masslike area.  We considered a punch biopsy today but I just do not see any signs of inflammatory breast cancer.    The encounter diagnosis was Mastitis of right breast unrelated to pregnancy of breastfeeding.

## 2019-02-22 ENCOUNTER — OFFICE VISIT (OUTPATIENT)
Dept: FAMILY MEDICINE CLINIC | Facility: CLINIC | Age: 69
End: 2019-02-22

## 2019-02-22 VITALS
HEIGHT: 64 IN | DIASTOLIC BLOOD PRESSURE: 72 MMHG | OXYGEN SATURATION: 98 % | HEART RATE: 70 BPM | SYSTOLIC BLOOD PRESSURE: 124 MMHG | BODY MASS INDEX: 20.24 KG/M2

## 2019-02-22 DIAGNOSIS — N63.10 MASS OF BREAST, RIGHT: ICD-10-CM

## 2019-02-22 DIAGNOSIS — D64.9 ANEMIA, UNSPECIFIED TYPE: ICD-10-CM

## 2019-02-22 DIAGNOSIS — Z09 HOSPITAL DISCHARGE FOLLOW-UP: Primary | ICD-10-CM

## 2019-02-22 PROCEDURE — 99213 OFFICE O/P EST LOW 20 MIN: CPT | Performed by: FAMILY MEDICINE

## 2019-02-22 NOTE — PROGRESS NOTES
Sameer Lee is a 69 y.o. female.     Chief Complaint   Patient presents with   • Hospital Follow Up     Breast Abscess       HPI     ED f/u:  Patient presented to the ED on 02/10/19 for worsening right breast pain, swelling, and erythema. She was admitted after failing outpatient therapy with dicloxacillin. She was placed on IV vancomycin and zosyn. She was discharged on 02/12/19 with 7 days of cephalexin 500mg/TID and doxycycline 100mg/BID. She was instructed to follow up with Dr. Villafana and Dr. Denny.     Breast US:  The findings are most compatible with edema/cellulitis and dilated ducts at 11 o'clock, 12 o'clock, 1 o'clock and 2 o'clock. No abscess identified. There is axillary adenopathy. Benign BI-RADS Category 2.  CXR:  1. Enlarged right breast with diffuse increased density and overlying skin thickening. Findings could be related to acute inflammatory process/phlegmon versus neoplasm. A loculated fluid collection is not  Seen.    Patient was seen by Dr. Denny yesterday. He did not want to do a biopsy at this time. She has a f/u appointment with him on 03/14/19.     Patient is still having some breast discomfort. She has experienced some fatigue and light-headedness. She feels like the skin on her breast is darker than normal. She has had a slightly decreased appetite since she was discharged from the hospital. She finished her antibiotics on 02/19/19. No fever, chest pain, SOB, vision changes, or syncopal episodes.       Review of Systems   Constitutional: Positive for appetite change (decrease) and fatigue. Negative for activity change and unexpected weight change.   HENT: Negative for congestion, sinus pain and tinnitus.    Eyes: Negative for pain and visual disturbance.   Respiratory: Negative for cough, chest tightness, shortness of breath and wheezing.    Cardiovascular: Negative for chest pain and palpitations.   Gastrointestinal: Negative for abdominal pain, diarrhea, nausea and  vomiting.   Musculoskeletal: Negative for arthralgias, back pain and myalgias.        Right breast discomfort     Skin: Positive for color change (dark skin to right breast). Negative for rash.   Allergic/Immunologic: Negative for environmental allergies and food allergies.   Neurological: Positive for light-headedness. Negative for dizziness and weakness.   Psychiatric/Behavioral: Negative for sleep disturbance. The patient is not nervous/anxious.        Past Medical History:   Diagnosis Date   • Arthritis    • Basal cell carcinoma     followed by Dermatology   • Endometriosis    • Fibrocystic breast    • History of colon polyps     followed by GI, Dr. Cuba   • Hyperlipidemia    • Menopausal symptoms     followed by Integrative Medicine, Dr. Elisa Chance   • Osteopenia     followed by Gynecology, Dr. Tequila Boyle   • Prediabetes    • Rectocele     s/p surgical repair at time of hysterctomy   • Recurrent sinusitis        Past Surgical History:   Procedure Laterality Date   • ABDOMINOPLASTY  2004   • ADENOIDECTOMY  1955   • BREAST SURGERY Left 1976    LUMPECTOMY   • BREAST SURGERY Left 2004    LUMPECTOMY   • BROW LIFT AND BLEPHAROPLASTY  2004   • COLONOSCOPY  06/03/2013    TUBULAR ADENOMAS, TORT COLON,  DR. CUBA   • CYSTOCELE REPAIR  05/2017   • ENDOSCOPY     • HAND SURGERY  1990   • HYSTERECTOMY     • LAPAROTOMY OOPHERECTOMY  1994   • LARYNGOSCOPY      IRRITATION   DR. CORREA   • POSTPARTUM TUBAL LIGATION  1980   • TONSILLECTOMY  1955   • TUBAL ABDOMINAL LIGATION     • WISDOM TOOTH EXTRACTION  1970       Family History   Problem Relation Age of Onset   • Cancer Father         Skin   • Heart disease Father    • Stroke Father    • Cancer Sister    • Macular degeneration Sister    • Arthritis Sister    • Alcohol abuse Mother        Social History     Tobacco Use   • Smoking status: Never Smoker   • Smokeless tobacco: Never Used   Substance Use Topics   • Alcohol use: Yes     Comment: about once per month   •  Drug use: No     Social History     Social History Narrative    Lives at home in a place of her own.  Enjoys tap and ballroom dance.         Allergies   Allergen Reactions   • Sulfa Antibiotics Rash        Outpatient Medications Prior to Visit   Medication Sig Dispense Refill   • acetaminophen (TYLENOL) 325 MG tablet Take 2 tablets by mouth Every 4 (Four) Hours As Needed for Mild Pain .     • Calcium Carbonate (CALCIUM 600 PO) Take 900 mg by mouth. With 150mg magnesium     • Diindolylmethane powder 150 mg. Tumeric 250mg, black pepper 2.5mg,     • estradiol (VIVELLE-DOT) 0.025 MG/24HR patch      • Estriol 10 % cream      • EVENING PRIMROSE OIL PO Take 3,900 mg by mouth.     • Folic Acid 20 MG capsule Take 1,600 mcg by mouth.     • Iodine Strong, Lugols, (IODINE STRONG PO) Take 1,200 mg by mouth.     • Iron Combinations (IRON COMPLEX PO) Take 20 mg by mouth Daily.     • MANGANESE PO Take 24 mg by mouth Daily.     • Niacin (VITAMIN B-3 PO) Take 100 mg by mouth.     • NON FORMULARY maqui berry extract 60mg daily for dry eye     • Nutritional Supplements (PYCNOGENOL PO) Take  by mouth. Omega 3 300mg, EPA 165mg, DHA 95mg     • Probiotic Product (PROBIOTIC ADVANCED PO) Take  by mouth.     • progesterone (PROMETRIUM) 100 MG capsule TK 1 C PO D  2   • Ubiquinol 100 MG capsule Take 100 mg by mouth.     • vitamin A 78791 UNIT capsule Take 3,750 Units by mouth Daily.     • vitamin B-12 (CYANOCOBALAMIN) 100 MCG tablet Take 58 mcg by mouth Daily.     • vitamin B-6 (PYRIDOXINE) 50 MG tablet Take 20 mg by mouth Daily.     • vitamin C (ASCORBIC ACID) 250 MG tablet Take 135 mg by mouth Daily.     • vitamin E 400 UNIT capsule Take 200 Units by mouth Daily. Selenium 50mcg, lecithin 150mg       No facility-administered medications prior to visit.        Objective     Vitals:    02/22/19 1200   BP: 124/72   Pulse: 70   SpO2: 98%       Physical Exam   Constitutional: She appears well-developed and well-nourished.   HENT:   Head:  Normocephalic and atraumatic.   Cardiovascular: Normal rate and regular rhythm. Exam reveals no gallop and no friction rub.   No murmur heard.  Pulmonary/Chest: Effort normal and breath sounds normal. No respiratory distress. She has no wheezes. She has no rales.   Breast Exam:  Approximately 10 cm irregular, hard mass palpable in the right breast between 10 o'clock and 2 o'clock.     Left breast normal.    Abdominal: There is no tenderness. There is no rigidity, no rebound and no guarding.   Musculoskeletal: Normal range of motion.   Lymphadenopathy:     She has no axillary adenopathy.   Neurological: She has normal strength. Gait normal.   Skin: Skin is warm and dry. There is erythema (mild erythema of upper outer quadrant of R breast skin).   Psychiatric: She has a normal mood and affect.   Vitals reviewed.      ASSESSMENT/PLAN       Problem List Items Addressed This Visit        Other    Mass of breast, right  Post-infectious inflammation vs. True mass  Continue to follow up w/breast surgeon, Dr. Denny as scheduled  Pt advised to hold hormonal medications until after her next follow up appt w/Dr. Denny      Other Visit Diagnoses     Hospital discharge follow-up    -  Primary      Anemia, unspecified type    With HgB 10.9 on 2/12/19 during hospitalization    Relevant Orders    CBC & Differential next week            Patient Instructions   Hold your estrogen/progesterone treatment until you follow up with Dr. Denny.     Incorporate beans and eggs into your diet for good sources of protein.     Return if symptoms worsen or fail to improve.       IShea, am scribing for, and in the presence of,Narcisa Villafana MD. 2/22/2019 12:36 PM    I, Narcisa Villafana MD   personally, performed the services described in this documentation, as scribed by,Shea Hubbard, in my presence, and it is both accurate and complete.    Narcisa Villafana MD  02/22/19  12:42  PM

## 2019-02-22 NOTE — PATIENT INSTRUCTIONS
Hold your estrogen/progesterone treatment until you follow up with Dr. Denny.     Incorporate beans and eggs into your diet for good sources of protein.   
Normal rate, regular rhythm.  Heart sounds S1, S2.  No murmurs, rubs or gallops.

## 2019-02-27 ENCOUNTER — RESULTS ENCOUNTER (OUTPATIENT)
Dept: FAMILY MEDICINE CLINIC | Facility: CLINIC | Age: 69
End: 2019-02-27

## 2019-02-27 DIAGNOSIS — D64.9 ANEMIA, UNSPECIFIED TYPE: ICD-10-CM

## 2019-03-01 ENCOUNTER — TELEPHONE (OUTPATIENT)
Dept: MAMMOGRAPHY | Facility: CLINIC | Age: 69
End: 2019-03-01

## 2019-03-01 LAB
BASOPHILS # BLD AUTO: 0.1 10*3/MM3 (ref 0–0.2)
BASOPHILS NFR BLD AUTO: 1.8 % (ref 0–1.5)
EOSINOPHIL # BLD AUTO: 0.27 10*3/MM3 (ref 0–0.4)
EOSINOPHIL NFR BLD AUTO: 5 % (ref 0.3–6.2)
ERYTHROCYTE [DISTWIDTH] IN BLOOD BY AUTOMATED COUNT: 13.1 % (ref 12.3–15.4)
HCT VFR BLD AUTO: 40.2 % (ref 34–46.6)
HGB BLD-MCNC: 12.4 G/DL (ref 12–15.9)
IMM GRANULOCYTES # BLD AUTO: 0.01 10*3/MM3 (ref 0–0.05)
IMM GRANULOCYTES NFR BLD AUTO: 0.2 % (ref 0–0.5)
LYMPHOCYTES # BLD AUTO: 1.83 10*3/MM3 (ref 0.7–3.1)
LYMPHOCYTES NFR BLD AUTO: 33.8 % (ref 19.6–45.3)
MCH RBC QN AUTO: 28.6 PG (ref 26.6–33)
MCHC RBC AUTO-ENTMCNC: 30.8 G/DL (ref 31.5–35.7)
MCV RBC AUTO: 92.6 FL (ref 79–97)
MONOCYTES # BLD AUTO: 0.53 10*3/MM3 (ref 0.1–0.9)
MONOCYTES NFR BLD AUTO: 9.8 % (ref 5–12)
NEUTROPHILS # BLD AUTO: 2.67 10*3/MM3 (ref 1.4–7)
NEUTROPHILS NFR BLD AUTO: 49.4 % (ref 42.7–76)
NRBC BLD AUTO-RTO: 0 /100 WBC (ref 0–0)
PLATELET # BLD AUTO: 312 10*3/MM3 (ref 140–450)
RBC # BLD AUTO: 4.34 10*6/MM3 (ref 3.77–5.28)
WBC # BLD AUTO: 5.41 10*3/MM3 (ref 3.4–10.8)

## 2019-03-01 NOTE — TELEPHONE ENCOUNTER
Has been diagnosed with Melanoma on her back since seeing you last. She cannot have surgery for this until they are sure the infection in her breast is totally clear. She states it is better than when you saw her last.  [phone]    I called patient back after speaking with Dr. Denny. I told her that Dr. Denny said this will give her more time to heal and to keep the appointment on March 14.

## 2019-03-04 ENCOUNTER — PATIENT MESSAGE (OUTPATIENT)
Dept: FAMILY MEDICINE CLINIC | Facility: CLINIC | Age: 69
End: 2019-03-04

## 2019-03-14 ENCOUNTER — OFFICE VISIT (OUTPATIENT)
Dept: MAMMOGRAPHY | Facility: CLINIC | Age: 69
End: 2019-03-14

## 2019-03-14 VITALS
DIASTOLIC BLOOD PRESSURE: 62 MMHG | TEMPERATURE: 98 F | SYSTOLIC BLOOD PRESSURE: 102 MMHG | HEART RATE: 62 BPM | OXYGEN SATURATION: 98 %

## 2019-03-14 DIAGNOSIS — N63.10 BREAST MASS, RIGHT: Primary | ICD-10-CM

## 2019-03-14 PROCEDURE — 99213 OFFICE O/P EST LOW 20 MIN: CPT | Performed by: SURGERY

## 2019-03-14 NOTE — PROGRESS NOTES
Sameer Lee is a 69 y.o. female     History of Present Illness this is a follow-up visit on a patient with a history of right breast mastitis.  I saw her approximately 3 weeks ago and there was some mild improvement in the right breast in terms of the physical examination.  The patient still feels that the right breast has increased in density in the upper outer quadrant compared to what it was before all this began.  She does feel that the skin has almost a blue or old bruised appearance.  She denies any fever or chills.  Her energy level is normal.        Review of Systems constitutional-no weight loss, fever or chills           respiratory-no shortness of breath or cough        Past Medical History:   Diagnosis Date   • Arthritis    • Basal cell carcinoma     followed by Dermatology   • Endometriosis    • Fibrocystic breast    • History of colon polyps     followed by GI, Dr. Cuba   • Hyperlipidemia    • Melanoma (CMS/HCC)    • Menopausal symptoms     followed by Integrative Medicine, Dr. Elisa Chance   • Osteopenia     followed by Gynecology, Dr. Tequila Boyle   • Prediabetes    • Rectocele     s/p surgical repair at time of hysterctomy   • Recurrent sinusitis      Past Surgical History:   Procedure Laterality Date   • ABDOMINOPLASTY  2004   • ADENOIDECTOMY  1955   • BREAST SURGERY Left 1976    LUMPECTOMY   • BREAST SURGERY Left 2004    LUMPECTOMY   • BROW LIFT AND BLEPHAROPLASTY  2004   • COLONOSCOPY  06/03/2013    TUBULAR ADENOMAS, TORT COLON,  DR. CUBA   • CYSTOCELE REPAIR  05/2017   • ENDOSCOPY     • HAND SURGERY  1990   • HYSTERECTOMY     • LAPAROTOMY OOPHERECTOMY  1994   • LARYNGOSCOPY      IRRITATION   DR. CORREA   • POSTPARTUM TUBAL LIGATION  1980   • TONSILLECTOMY  1955   • TUBAL ABDOMINAL LIGATION     • WISDOM TOOTH EXTRACTION  1970     Family History   Problem Relation Age of Onset   • Cancer Father         Skin   • Heart disease Father    • Stroke Father    • Cancer Sister    •  Macular degeneration Sister    • Arthritis Sister    • Alcohol abuse Mother      Social History     Socioeconomic History   • Marital status:      Spouse name: Not on file   • Number of children: Not on file   • Years of education: Not on file   • Highest education level: Not on file   Social Needs   • Financial resource strain: Not on file   • Food insecurity - worry: Not on file   • Food insecurity - inability: Not on file   • Transportation needs - medical: Not on file   • Transportation needs - non-medical: Not on file   Occupational History   • Not on file   Tobacco Use   • Smoking status: Never Smoker   • Smokeless tobacco: Never Used   Substance and Sexual Activity   • Alcohol use: Yes     Comment: about once per month   • Drug use: No   • Sexual activity: Defer   Other Topics Concern   • Not on file   Social History Narrative    Lives at home in a place of her own.  Enjoys tap and ballroom dance.         Objective   Physical Exam  General-average weight white female in no acute distress  Vital signs- blood pressure 102/62, pulse 62, temperature 98.0  Right breast- larger than the left.  There is clearly a fullness to the upper outer quadrant and on examination there is a large 10 x 10 cm firm area involving the upper outer quadrant of the breast.  I do not see any significant skin changes.  The skin of the breast does not appear to be red in any way.  There was no evidence of nipple discharge or nipple retraction.  Left breast-a little smaller than the right.  She does have significant fibrocystic nodularity in the upper outer quadrant but nowhere near the masslike area on the other side.  There is no evidence of nipple discharge or nipple retraction  Lymphatics-no cervical adenopathy.  She does have some mobile axillary adenopathy on both sides.  Assessment/Plan   Right breast mass in patient with a recent history of mastitis.  I am still concerned about this firm area that we can feel.  I would  like to proceed with an ultrasound and biopsy of this area.    There were no encounter diagnoses.

## 2019-04-04 ENCOUNTER — APPOINTMENT (OUTPATIENT)
Dept: WOMENS IMAGING | Facility: HOSPITAL | Age: 69
End: 2019-04-04

## 2019-04-04 PROCEDURE — 19083 BX BREAST 1ST LESION US IMAG: CPT | Performed by: RADIOLOGY

## 2019-04-09 ENCOUNTER — TELEPHONE (OUTPATIENT)
Dept: MAMMOGRAPHY | Facility: CLINIC | Age: 69
End: 2019-04-09

## 2019-04-09 DIAGNOSIS — D05.11 DUCTAL CARCINOMA IN SITU (DCIS) OF RIGHT BREAST: Primary | ICD-10-CM

## 2019-04-09 NOTE — TELEPHONE ENCOUNTER
I spoke with her on the phone today.  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  I have also spoken with the radiologist's and it is felt that an MRI would be helpful in this case.  That order was placed today.  I told her I would like to see her in the office for a breast cancer consult after the MRI returns.

## 2019-04-09 NOTE — TELEPHONE ENCOUNTER
Spoke with Gayatri concerning her new dx     She is scheduled for her MRI on 4/11/19      And a cancer surgery consult with Dr. Denny on: 4/16/19    Patient is aware of all appointment dates and times/ [place]

## 2019-04-11 ENCOUNTER — HOSPITAL ENCOUNTER (OUTPATIENT)
Dept: MRI IMAGING | Facility: HOSPITAL | Age: 69
Discharge: HOME | End: 2019-04-11
Admitting: SURGERY

## 2019-04-11 DIAGNOSIS — D05.11 DUCTAL CARCINOMA IN SITU (DCIS) OF RIGHT BREAST: ICD-10-CM

## 2019-04-11 LAB — CREAT BLDA-MCNC: 0.8 MG/DL (ref 0.6–1.3)

## 2019-04-11 PROCEDURE — C8908 MRI W/O FOL W/CONT, BREAST,: HCPCS

## 2019-04-11 PROCEDURE — A9577 INJ MULTIHANCE: HCPCS | Performed by: SURGERY

## 2019-04-11 PROCEDURE — C8937 CAD BREAST MRI: HCPCS

## 2019-04-11 PROCEDURE — 82565 ASSAY OF CREATININE: CPT

## 2019-04-11 PROCEDURE — 0 GADOBENATE DIMEGLUMINE 529 MG/ML SOLUTION: Performed by: SURGERY

## 2019-04-11 RX ADMIN — GADOBENATE DIMEGLUMINE 10 ML: 529 INJECTION, SOLUTION INTRAVENOUS at 09:58

## 2019-04-12 ENCOUNTER — TELEPHONE (OUTPATIENT)
Dept: FAMILY MEDICINE CLINIC | Facility: CLINIC | Age: 69
End: 2019-04-12

## 2019-04-12 NOTE — TELEPHONE ENCOUNTER
Patient left a VM stating that she has early stage breast cancer. She is off hormones for a few weeks now. She reports that she is having inflammation in her rectal area, and worred about urinary health. Gayatri wants to know if she can talk to Dr. Villafana or if she needs and appointment.

## 2019-04-16 ENCOUNTER — OFFICE VISIT (OUTPATIENT)
Dept: MAMMOGRAPHY | Facility: CLINIC | Age: 69
End: 2019-04-16

## 2019-04-16 VITALS
HEART RATE: 73 BPM | TEMPERATURE: 97.3 F | OXYGEN SATURATION: 98 % | SYSTOLIC BLOOD PRESSURE: 118 MMHG | DIASTOLIC BLOOD PRESSURE: 80 MMHG

## 2019-04-16 DIAGNOSIS — D05.11 DUCTAL CARCINOMA IN SITU (DCIS) OF RIGHT BREAST: Primary | ICD-10-CM

## 2019-04-16 PROCEDURE — 99215 OFFICE O/P EST HI 40 MIN: CPT | Performed by: SURGERY

## 2019-04-17 ENCOUNTER — TELEPHONE (OUTPATIENT)
Dept: OTHER | Facility: HOSPITAL | Age: 69
End: 2019-04-17

## 2019-04-17 NOTE — TELEPHONE ENCOUNTER
Referral received from Dr. Denny's office. Called Gayatri and left a message introducing myself and navigational services. Asked her to call me back at her convenience and left my contact information.

## 2019-04-18 ENCOUNTER — TELEPHONE (OUTPATIENT)
Dept: FAMILY MEDICINE CLINIC | Facility: CLINIC | Age: 69
End: 2019-04-18

## 2019-04-19 ENCOUNTER — OFFICE VISIT (OUTPATIENT)
Dept: FAMILY MEDICINE CLINIC | Facility: CLINIC | Age: 69
End: 2019-04-19

## 2019-04-19 ENCOUNTER — TELEPHONE (OUTPATIENT)
Dept: MAMMOGRAPHY | Facility: CLINIC | Age: 69
End: 2019-04-19

## 2019-04-19 VITALS
BODY MASS INDEX: 20.24 KG/M2 | SYSTOLIC BLOOD PRESSURE: 118 MMHG | DIASTOLIC BLOOD PRESSURE: 76 MMHG | HEIGHT: 64 IN | HEART RATE: 76 BPM | OXYGEN SATURATION: 98 %

## 2019-04-19 DIAGNOSIS — Z87.448: ICD-10-CM

## 2019-04-19 DIAGNOSIS — N95.2 ATROPHIC VAGINITIS: Primary | ICD-10-CM

## 2019-04-19 RX ORDER — NITROFURANTOIN 25; 75 MG/1; MG/1
CAPSULE ORAL
Qty: 30 CAPSULE | Refills: 3 | Status: SHIPPED | OUTPATIENT
Start: 2019-04-19 | End: 2019-05-08

## 2019-04-19 NOTE — TELEPHONE ENCOUNTER
Pt calling asking to speak with you. Pt states she got a 2nd opinion yesterday at [facility] and they brought up a few concerns that she would like to discuss with you. Can you please call her on her cell phone when you have time today.      [phone]

## 2019-04-29 ENCOUNTER — TELEPHONE (OUTPATIENT)
Dept: MAMMOGRAPHY | Facility: CLINIC | Age: 69
End: 2019-04-29

## 2019-04-29 ENCOUNTER — TELEPHONE (OUTPATIENT)
Dept: OTHER | Facility: HOSPITAL | Age: 69
End: 2019-04-29

## 2019-04-29 NOTE — TELEPHONE ENCOUNTER
I called Gayatri again and introduced myself and navigational services. She stated her consult with Dr. Denny went well and the plan is to have a Mastectomy with reconstruction on May 15th. She had a few follow up questions that we discussed. She is slightly worried about surgery and post surgery concerns. She wants to know if home health would be able to take care of her after surgery. I told her it depends on her insurance coverage, but I would ask [facility] care what the standard is and get back to her. I also mailed her some information on self pay home care agencies she could contact in case her insurance does not cover home health care.      We discussed integrative therapies and other services at [facility] including the opportunity to speak with our Oncology Dietitian or Oncology Social Worker. She verbalized interest in receiving a navigation folder outlining services. I verified her mailing address and will send out a navigation folder with the following information:     [facility],  [facility] Breast Cancer Support Group, Cancer and Restorative Exercise (CARE), [facility], [facility] Breast Cancer Survival,  For Women Facing Breast Cancer, [facility], [facility], Massage Therapy, Reiki Therapy, [employer] [place], Cancer Nutrition, Survivorship Clinic, Genetic Counseling.      She verbalized appreciation for navigational services and she has my contact information and will call with any questions that arise.

## 2019-04-30 ENCOUNTER — PREP FOR SURGERY (OUTPATIENT)
Dept: OTHER | Facility: HOSPITAL | Age: 69
End: 2019-04-30

## 2019-04-30 DIAGNOSIS — D05.11 DUCTAL CARCINOMA IN SITU (DCIS) OF RIGHT BREAST: Primary | ICD-10-CM

## 2019-04-30 RX ORDER — ACETAMINOPHEN 500 MG
1000 TABLET ORAL ONCE
Status: CANCELLED | OUTPATIENT
Start: 2019-05-15 | End: 2019-04-30

## 2019-04-30 RX ORDER — LIDOCAINE AND PRILOCAINE 25; 25 MG/G; MG/G
CREAM TOPICAL ONCE
Status: CANCELLED | OUTPATIENT
Start: 2019-05-15 | End: 2019-04-30

## 2019-04-30 RX ORDER — DIAZEPAM 5 MG/1
10 TABLET ORAL ONCE
Status: CANCELLED | OUTPATIENT
Start: 2019-05-15 | End: 2019-04-30

## 2019-04-30 RX ORDER — CEFAZOLIN SODIUM 2 G/100ML
2 INJECTION, SOLUTION INTRAVENOUS ONCE
Status: CANCELLED | OUTPATIENT
Start: 2019-05-15 | End: 2019-04-30

## 2019-05-01 PROBLEM — D05.11 DUCTAL CARCINOMA IN SITU (DCIS) OF RIGHT BREAST: Status: ACTIVE | Noted: 2019-05-01

## 2019-05-08 ENCOUNTER — APPOINTMENT (OUTPATIENT)
Dept: PREADMISSION TESTING | Facility: HOSPITAL | Age: 69
End: 2019-05-08

## 2019-05-08 VITALS
SYSTOLIC BLOOD PRESSURE: 122 MMHG | DIASTOLIC BLOOD PRESSURE: 69 MMHG | HEIGHT: 64 IN | HEART RATE: 64 BPM | WEIGHT: 119 LBS | BODY MASS INDEX: 20.32 KG/M2 | TEMPERATURE: 97.1 F | RESPIRATION RATE: 18 BRPM | OXYGEN SATURATION: 99 %

## 2019-05-08 DIAGNOSIS — D05.11 DUCTAL CARCINOMA IN SITU (DCIS) OF RIGHT BREAST: ICD-10-CM

## 2019-05-08 LAB
ALBUMIN SERPL-MCNC: 4.2 G/DL (ref 3.5–5.2)
ALBUMIN/GLOB SERPL: 1.4 G/DL
ALP SERPL-CCNC: 55 U/L (ref 39–117)
ALT SERPL W P-5'-P-CCNC: 21 U/L (ref 1–33)
ANION GAP SERPL CALCULATED.3IONS-SCNC: 12.6 MMOL/L
AST SERPL-CCNC: 21 U/L (ref 1–32)
BILIRUB SERPL-MCNC: 0.3 MG/DL (ref 0.2–1.2)
BUN BLD-MCNC: 25 MG/DL (ref 8–23)
BUN/CREAT SERPL: 30.5 (ref 7–25)
CALCIUM SPEC-SCNC: 10.1 MG/DL (ref 8.6–10.5)
CHLORIDE SERPL-SCNC: 100 MMOL/L (ref 98–107)
CO2 SERPL-SCNC: 28.4 MMOL/L (ref 22–29)
CREAT BLD-MCNC: 0.82 MG/DL (ref 0.57–1)
DEPRECATED RDW RBC AUTO: 44 FL (ref 37–54)
ERYTHROCYTE [DISTWIDTH] IN BLOOD BY AUTOMATED COUNT: 13.3 % (ref 12.3–15.4)
GFR SERPL CREATININE-BSD FRML MDRD: 69 ML/MIN/1.73
GLOBULIN UR ELPH-MCNC: 2.9 GM/DL
GLUCOSE BLD-MCNC: 100 MG/DL (ref 65–99)
HCT VFR BLD AUTO: 38 % (ref 34–46.6)
HGB BLD-MCNC: 12.1 G/DL (ref 12–15.9)
MCH RBC QN AUTO: 28.7 PG (ref 26.6–33)
MCHC RBC AUTO-ENTMCNC: 31.8 G/DL (ref 31.5–35.7)
MCV RBC AUTO: 90.3 FL (ref 79–97)
PLATELET # BLD AUTO: 292 10*3/MM3 (ref 140–450)
PMV BLD AUTO: 10.4 FL (ref 6–12)
POTASSIUM BLD-SCNC: 3.8 MMOL/L (ref 3.5–5.2)
PROT SERPL-MCNC: 7.1 G/DL (ref 6–8.5)
RBC # BLD AUTO: 4.21 10*6/MM3 (ref 3.77–5.28)
SODIUM BLD-SCNC: 141 MMOL/L (ref 136–145)
WBC NRBC COR # BLD: 6.09 10*3/MM3 (ref 3.4–10.8)

## 2019-05-08 PROCEDURE — 93005 ELECTROCARDIOGRAM TRACING: CPT

## 2019-05-08 PROCEDURE — 93010 ELECTROCARDIOGRAM REPORT: CPT | Performed by: INTERNAL MEDICINE

## 2019-05-08 PROCEDURE — 85027 COMPLETE CBC AUTOMATED: CPT | Performed by: SURGERY

## 2019-05-08 PROCEDURE — 36415 COLL VENOUS BLD VENIPUNCTURE: CPT

## 2019-05-08 PROCEDURE — 80053 COMPREHEN METABOLIC PANEL: CPT | Performed by: SURGERY

## 2019-05-08 RX ORDER — MELATONIN
500 DAILY
COMMUNITY

## 2019-05-08 NOTE — DISCHARGE INSTRUCTIONS
Take the following medications the morning of surgery with a small sip of water: NONE    ARRIVE  AM    General Instructions:  • Do not eat solid food after midnight the night before surgery.  • You may drink clear liquids day of surgery but must stop at least one hour before your hospital arrival time.  • It is beneficial for you to have a clear drink that contains carbohydrates the day of surgery.  We suggest a 12 to 20 ounce bottle of Gatorade or Powerade for non-diabetic patients or a 12 to 20 ounce bottle of G2 or Powerade Zero for diabetic patients. (Pediatric patients, are not advised to drink a 12 to 20 ounce carbohydrate drink)    Clear liquids are liquids you can see through.  Nothing red in color.     Plain water                               Sports drinks  Sodas                                   Gelatin (Jell-O)  Fruit juices without pulp such as white grape juice and apple juice  Popsicles that contain no fruit or yogurt  Tea or coffee (no cream or milk added)  Gatorade / Powerade  G2 / Powerade Zero    • Infants may have breast milk up to four hours before surgery.  • Infants drinking formula may drink formula up to six hours before surgery.   • Patients who avoid smoking, chewing tobacco and alcohol for 4 weeks prior to surgery have a reduced risk of post-operative complications.  Quit smoking as many days before surgery as you can.  • Do not smoke, use chewing tobacco or drink alcohol the day of surgery.   • If applicable bring your C-PAP/ BI-PAP machine.  • Bring any papers given to you in the doctor’s office.  • Wear clean comfortable clothes and socks.  • Do not wear contact lenses, false eyelashes or make-up.  Bring a case for your glasses.   • Bring crutches or walker if applicable.  • Remove all piercings.  Leave jewelry and any other valuables at home.  • Hair extensions with metal clips must be removed prior to surgery.  • The Pre-Admission Testing nurse will instruct you to bring  medications if unable to obtain an accurate list in Pre-Admission Testing.            Preventing a Surgical Site Infection:  • For 2 to 3 days before surgery, avoid shaving with a razor because the razor can irritate skin and make it easier to develop an infection.    • Any areas of open skin can increase the risk of a post-operative wound infection by allowing bacteria to enter and travel throughout the body.  Notify your surgeon if you have any skin wounds / rashes even if it is not near the expected surgical site.  The area will need assessed to determine if surgery should be delayed until it is healed.  • The night prior to surgery sleep in a clean bed with clean clothing.  Do not allow pets to sleep with you.  • Shower on the morning of surgery using a fresh bar of anti-bacterial soap (such as Dial) and clean washcloth.  Dry with a clean towel and dress in clean clothing.  • Ask your surgeon if you will be receiving antibiotics prior to surgery.  • Make sure you, your family, and all healthcare providers clean their hands with soap and water or an alcohol based hand  before caring for you or your wound.    Day of surgery:  Upon arrival, a Pre-op nurse and Anesthesiologist will review your health history, obtain vital signs, and answer questions you may have.  The only belongings needed at this time will be a list of your home medications and if applicable your C-PAP/BI-PAP machine.  If you are staying overnight your family can leave the rest of your belongings in the car and bring them to your room later.  A Pre-op nurse will start an IV and you may receive medication in preparation for surgery, including something to help you relax.  Your family will be able to see you in the Pre-op area.  While you are in surgery your family should notify the waiting room  if they leave the waiting room area and provide a contact phone number.    Please be aware that surgery does come with discomfort.  We  want to make every effort to control your discomfort so please discuss any uncontrolled symptoms with your nurse.   Your doctor will most likely have prescribed pain medications.      If you are going home after surgery you will receive individualized written care instructions before being discharged.  A responsible adult must drive you to and from the hospital on the day of your surgery and stay with you for 24 hours.    If you are staying overnight following surgery, you will be transported to your hospital room following the recovery period.  [facility] has all private rooms.    You have received a list of surgical assistants for your reference.  If you have any questions please call Pre-Admission Testing at [phone].  Deductibles and co-payments are collected on the day of service. Please be prepared to pay the required co-pay, deductible or deposit on the day of service as defined by your plan.  2% CHLORAHEXIDINE GLUCONATE* CLOTH  Preparing or “prepping” skin before surgery can reduce the risk of infection at the surgical site. To make the process easier, [facility] has chosen disposable cloths moistened with a rinse-free, 2% Chlorhexidine Gluconate (CHG) antiseptic solution. The steps below outline the prepping process and should be carefully followed.        Use the prep cloth on the area that is circled in the diagram             Directions Night before Surgery  1) Shower using a fresh bar of anti-bacterial soap (such as Dial) and clean washcloth.  Use a clean towel to completely dry your skin.  2) Do not use any lotions, oils or creams on your skin.  3) Open the package and remove 1 cloth, wipe your skin for 30 seconds in a circular motion.  Allow to dry for 3 minutes.  4) Repeat #3 with second cloth.  5) Do not touch your eyes, ears, or mouth with the prep cloth.  6) Allow the wet prep solution to air dry.  7) Discard the prep cloth and wash your hands with soap and water.    8) Dress in clean bed clothes and sleep on fresh clean bed sheets.   9) You may experience some temporary itching after the prep.    Directions Day of Surgery  1) Repeat steps 1,2,3,4,5,6,7, and 9.   2) Dress in clean clothes before coming to the hospital.

## 2019-05-14 ENCOUNTER — TELEPHONE (OUTPATIENT)
Dept: MAMMOGRAPHY | Facility: CLINIC | Age: 69
End: 2019-05-14

## 2019-05-14 DIAGNOSIS — D05.11 DUCTAL CARCINOMA IN SITU (DCIS) OF RIGHT BREAST: Primary | ICD-10-CM

## 2019-05-14 NOTE — TELEPHONE ENCOUNTER
Pt calling because she received a call from [facility] and they wanted to schedule her with dr estrada, but she only wants to see dr tirado. She was confused as to why they were calling already. She said she would bring this up the day of her surgery (tomorrow)

## 2019-05-15 ENCOUNTER — ANESTHESIA (OUTPATIENT)
Dept: PERIOP | Facility: HOSPITAL | Age: 69
End: 2019-05-15

## 2019-05-15 ENCOUNTER — ANESTHESIA EVENT (OUTPATIENT)
Dept: PERIOP | Facility: HOSPITAL | Age: 69
End: 2019-05-15

## 2019-05-15 ENCOUNTER — HOSPITAL ENCOUNTER (OUTPATIENT)
Facility: HOSPITAL | Age: 69
Discharge: HOME | End: 2019-05-16
Attending: SURGERY | Admitting: SURGERY

## 2019-05-15 ENCOUNTER — HOSPITAL ENCOUNTER (OUTPATIENT)
Dept: NUCLEAR MEDICINE | Facility: HOSPITAL | Age: 69
Discharge: HOME | End: 2019-05-15

## 2019-05-15 DIAGNOSIS — D05.11 DUCTAL CARCINOMA IN SITU (DCIS) OF RIGHT BREAST: ICD-10-CM

## 2019-05-15 PROCEDURE — A9270 NON-COVERED ITEM OR SERVICE: HCPCS | Performed by: SURGERY

## 2019-05-15 PROCEDURE — C1789 PROSTHESIS, BREAST, IMP: HCPCS | Performed by: SURGERY

## 2019-05-15 PROCEDURE — 38792 RA TRACER ID OF SENTINL NODE: CPT

## 2019-05-15 PROCEDURE — 63710000001 LIDOCAINE-PRILOCAINE 2.5-2.5 % CREAM 5 G TUBE: Performed by: SURGERY

## 2019-05-15 PROCEDURE — 63710000001 SACCHAROMYCES BOULARDII 250 MG CAPSULE: Performed by: SURGERY

## 2019-05-15 PROCEDURE — 88307 TISSUE EXAM BY PATHOLOGIST: CPT | Performed by: SURGERY

## 2019-05-15 PROCEDURE — 63710000001 DOCUSATE SODIUM 100 MG CAPSULE: Performed by: SURGERY

## 2019-05-15 PROCEDURE — C9290 INJ, BUPIVACAINE LIPOSOME: HCPCS | Performed by: SURGERY

## 2019-05-15 PROCEDURE — 25010000003 BUPIVACAINE LIPOSOME 1.3 % SUSPENSION 20 ML VIAL: Performed by: SURGERY

## 2019-05-15 PROCEDURE — 63710000001 HYDROXYZINE PAMOATE PER 50 MG: Performed by: SURGERY

## 2019-05-15 PROCEDURE — 63710000001 BACITRACIN 500 UNIT/GM OINTMENT 14 G TUBE: Performed by: SURGERY

## 2019-05-15 PROCEDURE — 88342 IMHCHEM/IMCYTCHM 1ST ANTB: CPT | Performed by: SURGERY

## 2019-05-15 PROCEDURE — 38525 BIOPSY/REMOVAL LYMPH NODES: CPT | Performed by: SURGERY

## 2019-05-15 PROCEDURE — 88341 IMHCHEM/IMCYTCHM EA ADD ANTB: CPT | Performed by: SURGERY

## 2019-05-15 PROCEDURE — 63710000001 POLYETHYLENE GLYCOL PACK: Performed by: SURGERY

## 2019-05-15 PROCEDURE — 88331 PATH CONSLTJ SURG 1 BLK 1SPC: CPT | Performed by: SURGERY

## 2019-05-15 PROCEDURE — 63710000001 POVIDONE-IODINE 10 % SOLUTION 15 ML BOTTLE: Performed by: SURGERY

## 2019-05-15 PROCEDURE — 38900 IO MAP OF SENT LYMPH NODE: CPT | Performed by: SURGERY

## 2019-05-15 PROCEDURE — 63710000001 ACETAMINOPHEN 325 MG TABLET: Performed by: SURGERY

## 2019-05-15 PROCEDURE — 63710000001 DIAZEPAM 5 MG TABLET: Performed by: SURGERY

## 2019-05-15 PROCEDURE — 25010000003 CEFAZOLIN PER 500 MG: Performed by: SURGERY

## 2019-05-15 PROCEDURE — 25010000002 MIDAZOLAM PER 1 MG: Performed by: ANESTHESIOLOGY

## 2019-05-15 PROCEDURE — 25010000002 GENTAMICIN PER 80 MG: Performed by: SURGERY

## 2019-05-15 PROCEDURE — 25010000002 FENTANYL CITRATE (PF) 100 MCG/2ML SOLUTION: Performed by: ANESTHESIOLOGY

## 2019-05-15 PROCEDURE — 25010000002 ONDANSETRON PER 1 MG: Performed by: NURSE ANESTHETIST, CERTIFIED REGISTERED

## 2019-05-15 PROCEDURE — A9541 TC99M SULFUR COLLOID: HCPCS | Performed by: SURGERY

## 2019-05-15 PROCEDURE — 25010000002 FENTANYL CITRATE (PF) 100 MCG/2ML SOLUTION: Performed by: NURSE ANESTHETIST, CERTIFIED REGISTERED

## 2019-05-15 PROCEDURE — 25010000002 DEXAMETHASONE PER 1 MG: Performed by: SURGERY

## 2019-05-15 PROCEDURE — 63710000001 CYCLOBENZAPRINE 10 MG TABLET: Performed by: SURGERY

## 2019-05-15 PROCEDURE — 25010000002 ROPIVACAINE PER 1 MG: Performed by: SURGERY

## 2019-05-15 PROCEDURE — 63710000001 SCOPOLAMINE 1.5 MG/3DAYS PATCH 72 HOUR: Performed by: SURGERY

## 2019-05-15 PROCEDURE — 88332 PATH CONSLTJ SURG EA ADD BLK: CPT | Performed by: SURGERY

## 2019-05-15 PROCEDURE — 25010000003 CEFAZOLIN IN DEXTROSE 2-4 GM/100ML-% SOLUTION: Performed by: SURGERY

## 2019-05-15 PROCEDURE — 0 TECHNETIUM FILTERED SULFUR COLLOID: Performed by: SURGERY

## 2019-05-15 PROCEDURE — 19303 MAST SIMPLE COMPLETE: CPT | Performed by: SURGERY

## 2019-05-15 PROCEDURE — 63710000001 NITROGLYCERIN 2 % OINTMENT: Performed by: SURGERY

## 2019-05-15 PROCEDURE — 25010000002 PROPOFOL 10 MG/ML EMULSION: Performed by: NURSE ANESTHETIST, CERTIFIED REGISTERED

## 2019-05-15 DEVICE — SMOOTH, HIGH PROFILE, SUTURE TABS, INTEGRAL INJECTION DOME, 375CC
Type: IMPLANTABLE DEVICE | Site: BREAST | Status: FUNCTIONAL
Brand: ARTOURA BREAST TISSUE EXPANDER

## 2019-05-15 DEVICE — GRFT TISS ALLODERM RTM PERF CONTR 132SQ/CM THN MD: Type: IMPLANTABLE DEVICE | Site: BREAST | Status: FUNCTIONAL

## 2019-05-15 RX ORDER — PROMETHAZINE HYDROCHLORIDE 25 MG/1
25 SUPPOSITORY RECTAL ONCE AS NEEDED
Status: DISCONTINUED | OUTPATIENT
Start: 2019-05-15 | End: 2019-05-15 | Stop reason: HOSPADM

## 2019-05-15 RX ORDER — PROPOFOL 10 MG/ML
VIAL (ML) INTRAVENOUS AS NEEDED
Status: DISCONTINUED | OUTPATIENT
Start: 2019-05-15 | End: 2019-05-15 | Stop reason: SURG

## 2019-05-15 RX ORDER — MAGNESIUM HYDROXIDE 1200 MG/15ML
LIQUID ORAL AS NEEDED
Status: DISCONTINUED | OUTPATIENT
Start: 2019-05-15 | End: 2019-05-15 | Stop reason: HOSPADM

## 2019-05-15 RX ORDER — CYCLOBENZAPRINE HCL 10 MG
5 TABLET ORAL ONCE
Status: COMPLETED | OUTPATIENT
Start: 2019-05-15 | End: 2019-05-15

## 2019-05-15 RX ORDER — ONDANSETRON 4 MG/1
4 TABLET, FILM COATED ORAL EVERY 6 HOURS PRN
Status: DISCONTINUED | OUTPATIENT
Start: 2019-05-15 | End: 2019-05-16 | Stop reason: HOSPADM

## 2019-05-15 RX ORDER — SODIUM CHLORIDE 0.9 % (FLUSH) 0.9 %
1-10 SYRINGE (ML) INJECTION AS NEEDED
Status: DISCONTINUED | OUTPATIENT
Start: 2019-05-15 | End: 2019-05-15 | Stop reason: HOSPADM

## 2019-05-15 RX ORDER — HYDROXYZINE PAMOATE 50 MG/1
50 CAPSULE ORAL ONCE
Status: COMPLETED | OUTPATIENT
Start: 2019-05-15 | End: 2019-05-15

## 2019-05-15 RX ORDER — LIDOCAINE AND PRILOCAINE 25; 25 MG/G; MG/G
CREAM TOPICAL ONCE
Status: COMPLETED | OUTPATIENT
Start: 2019-05-15 | End: 2019-05-15

## 2019-05-15 RX ORDER — PROMETHAZINE HYDROCHLORIDE 25 MG/ML
12.5 INJECTION, SOLUTION INTRAMUSCULAR; INTRAVENOUS EVERY 6 HOURS PRN
Status: DISCONTINUED | OUTPATIENT
Start: 2019-05-15 | End: 2019-05-16 | Stop reason: HOSPADM

## 2019-05-15 RX ORDER — ACETAMINOPHEN 650 MG
TABLET, EXTENDED RELEASE ORAL AS NEEDED
Status: DISCONTINUED | OUTPATIENT
Start: 2019-05-15 | End: 2019-05-15 | Stop reason: HOSPADM

## 2019-05-15 RX ORDER — ONDANSETRON 2 MG/ML
4 INJECTION INTRAMUSCULAR; INTRAVENOUS ONCE AS NEEDED
Status: DISCONTINUED | OUTPATIENT
Start: 2019-05-15 | End: 2019-05-15 | Stop reason: HOSPADM

## 2019-05-15 RX ORDER — CYCLOBENZAPRINE HCL 10 MG
10 TABLET ORAL ONCE
Status: COMPLETED | OUTPATIENT
Start: 2019-05-15 | End: 2019-05-15

## 2019-05-15 RX ORDER — ACETAMINOPHEN 500 MG
1000 TABLET ORAL ONCE
Status: DISCONTINUED | OUTPATIENT
Start: 2019-05-15 | End: 2019-05-15 | Stop reason: HOSPADM

## 2019-05-15 RX ORDER — FAMOTIDINE 10 MG/ML
20 INJECTION, SOLUTION INTRAVENOUS ONCE
Status: COMPLETED | OUTPATIENT
Start: 2019-05-15 | End: 2019-05-15

## 2019-05-15 RX ORDER — HYDROCODONE BITARTRATE AND ACETAMINOPHEN 7.5; 325 MG/1; MG/1
1 TABLET ORAL ONCE AS NEEDED
Status: DISCONTINUED | OUTPATIENT
Start: 2019-05-15 | End: 2019-05-15 | Stop reason: HOSPADM

## 2019-05-15 RX ORDER — BISACODYL 10 MG
10 SUPPOSITORY, RECTAL RECTAL DAILY PRN
Status: DISCONTINUED | OUTPATIENT
Start: 2019-05-15 | End: 2019-05-16 | Stop reason: HOSPADM

## 2019-05-15 RX ORDER — PROMETHAZINE HYDROCHLORIDE 25 MG/1
25 TABLET ORAL ONCE AS NEEDED
Status: DISCONTINUED | OUTPATIENT
Start: 2019-05-15 | End: 2019-05-15 | Stop reason: HOSPADM

## 2019-05-15 RX ORDER — FENTANYL CITRATE 50 UG/ML
50 INJECTION, SOLUTION INTRAMUSCULAR; INTRAVENOUS
Status: DISCONTINUED | OUTPATIENT
Start: 2019-05-15 | End: 2019-05-15 | Stop reason: HOSPADM

## 2019-05-15 RX ORDER — DOCUSATE SODIUM 100 MG/1
100 CAPSULE, LIQUID FILLED ORAL 2 TIMES DAILY
Status: DISCONTINUED | OUTPATIENT
Start: 2019-05-15 | End: 2019-05-16 | Stop reason: HOSPADM

## 2019-05-15 RX ORDER — GINSENG 100 MG
CAPSULE ORAL AS NEEDED
Status: DISCONTINUED | OUTPATIENT
Start: 2019-05-15 | End: 2019-05-15 | Stop reason: HOSPADM

## 2019-05-15 RX ORDER — ACETAMINOPHEN 325 MG/1
975 TABLET ORAL ONCE
Status: COMPLETED | OUTPATIENT
Start: 2019-05-15 | End: 2019-05-15

## 2019-05-15 RX ORDER — HYDROMORPHONE HYDROCHLORIDE 1 MG/ML
0.5 INJECTION, SOLUTION INTRAMUSCULAR; INTRAVENOUS; SUBCUTANEOUS
Status: DISCONTINUED | OUTPATIENT
Start: 2019-05-15 | End: 2019-05-15 | Stop reason: HOSPADM

## 2019-05-15 RX ORDER — ROCURONIUM BROMIDE 10 MG/ML
INJECTION, SOLUTION INTRAVENOUS AS NEEDED
Status: DISCONTINUED | OUTPATIENT
Start: 2019-05-15 | End: 2019-05-15 | Stop reason: SURG

## 2019-05-15 RX ORDER — NALOXONE HCL 0.4 MG/ML
0.2 VIAL (ML) INJECTION AS NEEDED
Status: DISCONTINUED | OUTPATIENT
Start: 2019-05-15 | End: 2019-05-15 | Stop reason: HOSPADM

## 2019-05-15 RX ORDER — OXYCODONE AND ACETAMINOPHEN 7.5; 325 MG/1; MG/1
1 TABLET ORAL ONCE AS NEEDED
Status: DISCONTINUED | OUTPATIENT
Start: 2019-05-15 | End: 2019-05-15 | Stop reason: HOSPADM

## 2019-05-15 RX ORDER — CYCLOBENZAPRINE HCL 10 MG
5 TABLET ORAL 3 TIMES DAILY PRN
Status: DISCONTINUED | OUTPATIENT
Start: 2019-05-15 | End: 2019-05-16 | Stop reason: HOSPADM

## 2019-05-15 RX ORDER — HYDROXYZINE PAMOATE 50 MG/1
50 CAPSULE ORAL 4 TIMES DAILY PRN
Status: DISCONTINUED | OUTPATIENT
Start: 2019-05-15 | End: 2019-05-16 | Stop reason: HOSPADM

## 2019-05-15 RX ORDER — CEFAZOLIN SODIUM 2 G/100ML
2 INJECTION, SOLUTION INTRAVENOUS ONCE
Status: COMPLETED | OUTPATIENT
Start: 2019-05-15 | End: 2019-05-15

## 2019-05-15 RX ORDER — LIDOCAINE HYDROCHLORIDE 20 MG/ML
INJECTION, SOLUTION INFILTRATION; PERINEURAL AS NEEDED
Status: DISCONTINUED | OUTPATIENT
Start: 2019-05-15 | End: 2019-05-15 | Stop reason: SURG

## 2019-05-15 RX ORDER — LIDOCAINE HYDROCHLORIDE 10 MG/ML
0.5 INJECTION, SOLUTION EPIDURAL; INFILTRATION; INTRACAUDAL; PERINEURAL ONCE AS NEEDED
Status: DISCONTINUED | OUTPATIENT
Start: 2019-05-15 | End: 2019-05-15 | Stop reason: HOSPADM

## 2019-05-15 RX ORDER — EPHEDRINE SULFATE 50 MG/ML
5 INJECTION, SOLUTION INTRAVENOUS ONCE AS NEEDED
Status: DISCONTINUED | OUTPATIENT
Start: 2019-05-15 | End: 2019-05-15 | Stop reason: HOSPADM

## 2019-05-15 RX ORDER — CLINDAMYCIN PHOSPHATE 600 MG/50ML
600 INJECTION INTRAVENOUS EVERY 8 HOURS
Status: DISCONTINUED | OUTPATIENT
Start: 2019-05-15 | End: 2019-05-16 | Stop reason: HOSPADM

## 2019-05-15 RX ORDER — PROMETHAZINE HYDROCHLORIDE 25 MG/ML
6.25 INJECTION, SOLUTION INTRAMUSCULAR; INTRAVENOUS
Status: DISCONTINUED | OUTPATIENT
Start: 2019-05-15 | End: 2019-05-15 | Stop reason: HOSPADM

## 2019-05-15 RX ORDER — CLINDAMYCIN PHOSPHATE 900 MG/50ML
900 INJECTION INTRAVENOUS ONCE
Status: COMPLETED | OUTPATIENT
Start: 2019-05-15 | End: 2019-05-15

## 2019-05-15 RX ORDER — SODIUM CHLORIDE, SODIUM LACTATE, POTASSIUM CHLORIDE, CALCIUM CHLORIDE 600; 310; 30; 20 MG/100ML; MG/100ML; MG/100ML; MG/100ML
9 INJECTION, SOLUTION INTRAVENOUS CONTINUOUS
Status: DISCONTINUED | OUTPATIENT
Start: 2019-05-15 | End: 2019-05-16 | Stop reason: HOSPADM

## 2019-05-15 RX ORDER — FLUMAZENIL 0.1 MG/ML
0.2 INJECTION INTRAVENOUS AS NEEDED
Status: DISCONTINUED | OUTPATIENT
Start: 2019-05-15 | End: 2019-05-15 | Stop reason: HOSPADM

## 2019-05-15 RX ORDER — ROPIVACAINE HYDROCHLORIDE 5 MG/ML
INJECTION, SOLUTION EPIDURAL; INFILTRATION; PERINEURAL AS NEEDED
Status: DISCONTINUED | OUTPATIENT
Start: 2019-05-15 | End: 2019-05-15 | Stop reason: HOSPADM

## 2019-05-15 RX ORDER — HYDRALAZINE HYDROCHLORIDE 20 MG/ML
5 INJECTION INTRAMUSCULAR; INTRAVENOUS
Status: DISCONTINUED | OUTPATIENT
Start: 2019-05-15 | End: 2019-05-15 | Stop reason: HOSPADM

## 2019-05-15 RX ORDER — SACCHAROMYCES BOULARDII 250 MG
500 CAPSULE ORAL 2 TIMES DAILY
Status: DISCONTINUED | OUTPATIENT
Start: 2019-05-15 | End: 2019-05-16 | Stop reason: HOSPADM

## 2019-05-15 RX ORDER — ONDANSETRON 2 MG/ML
4 INJECTION INTRAMUSCULAR; INTRAVENOUS EVERY 6 HOURS PRN
Status: DISCONTINUED | OUTPATIENT
Start: 2019-05-15 | End: 2019-05-16 | Stop reason: HOSPADM

## 2019-05-15 RX ORDER — NALOXONE HCL 0.4 MG/ML
0.1 VIAL (ML) INJECTION
Status: DISCONTINUED | OUTPATIENT
Start: 2019-05-15 | End: 2019-05-16 | Stop reason: HOSPADM

## 2019-05-15 RX ORDER — SCOLOPAMINE TRANSDERMAL SYSTEM 1 MG/1
1 PATCH, EXTENDED RELEASE TRANSDERMAL ONCE
Status: DISCONTINUED | OUTPATIENT
Start: 2019-05-15 | End: 2019-05-15

## 2019-05-15 RX ORDER — PROMETHAZINE HYDROCHLORIDE 25 MG/ML
12.5 INJECTION, SOLUTION INTRAMUSCULAR; INTRAVENOUS ONCE AS NEEDED
Status: DISCONTINUED | OUTPATIENT
Start: 2019-05-15 | End: 2019-05-15 | Stop reason: HOSPADM

## 2019-05-15 RX ORDER — EPHEDRINE SULFATE 50 MG/ML
INJECTION, SOLUTION INTRAVENOUS AS NEEDED
Status: DISCONTINUED | OUTPATIENT
Start: 2019-05-15 | End: 2019-05-15 | Stop reason: SURG

## 2019-05-15 RX ORDER — MIDAZOLAM HYDROCHLORIDE 1 MG/ML
1 INJECTION INTRAMUSCULAR; INTRAVENOUS
Status: DISCONTINUED | OUTPATIENT
Start: 2019-05-15 | End: 2019-05-15 | Stop reason: HOSPADM

## 2019-05-15 RX ORDER — SODIUM CHLORIDE, SODIUM LACTATE, POTASSIUM CHLORIDE, CALCIUM CHLORIDE 600; 310; 30; 20 MG/100ML; MG/100ML; MG/100ML; MG/100ML
100 INJECTION, SOLUTION INTRAVENOUS CONTINUOUS
Status: DISCONTINUED | OUTPATIENT
Start: 2019-05-15 | End: 2019-05-16 | Stop reason: HOSPADM

## 2019-05-15 RX ORDER — ONDANSETRON 2 MG/ML
INJECTION INTRAMUSCULAR; INTRAVENOUS AS NEEDED
Status: DISCONTINUED | OUTPATIENT
Start: 2019-05-15 | End: 2019-05-15 | Stop reason: SURG

## 2019-05-15 RX ORDER — OXYCODONE HYDROCHLORIDE AND ACETAMINOPHEN 5; 325 MG/1; MG/1
2 TABLET ORAL EVERY 4 HOURS PRN
Status: DISCONTINUED | OUTPATIENT
Start: 2019-05-15 | End: 2019-05-16 | Stop reason: HOSPADM

## 2019-05-15 RX ORDER — DIAZEPAM 5 MG/1
10 TABLET ORAL ONCE
Status: COMPLETED | OUTPATIENT
Start: 2019-05-15 | End: 2019-05-15

## 2019-05-15 RX ORDER — DIPHENHYDRAMINE HCL 25 MG
25 CAPSULE ORAL
Status: DISCONTINUED | OUTPATIENT
Start: 2019-05-15 | End: 2019-05-15 | Stop reason: HOSPADM

## 2019-05-15 RX ORDER — DEXAMETHASONE SODIUM PHOSPHATE 4 MG/ML
8 INJECTION, SOLUTION INTRA-ARTICULAR; INTRALESIONAL; INTRAMUSCULAR; INTRAVENOUS; SOFT TISSUE ONCE
Status: COMPLETED | OUTPATIENT
Start: 2019-05-15 | End: 2019-05-15

## 2019-05-15 RX ORDER — ACETAMINOPHEN 325 MG/1
650 TABLET ORAL ONCE AS NEEDED
Status: DISCONTINUED | OUTPATIENT
Start: 2019-05-15 | End: 2019-05-15 | Stop reason: HOSPADM

## 2019-05-15 RX ORDER — HYDROMORPHONE HYDROCHLORIDE 1 MG/ML
0.25 INJECTION, SOLUTION INTRAMUSCULAR; INTRAVENOUS; SUBCUTANEOUS
Status: DISCONTINUED | OUTPATIENT
Start: 2019-05-15 | End: 2019-05-16 | Stop reason: HOSPADM

## 2019-05-15 RX ORDER — MIDAZOLAM HYDROCHLORIDE 1 MG/ML
2 INJECTION INTRAMUSCULAR; INTRAVENOUS
Status: DISCONTINUED | OUTPATIENT
Start: 2019-05-15 | End: 2019-05-15 | Stop reason: HOSPADM

## 2019-05-15 RX ADMIN — CLINDAMYCIN PHOSPHATE 600 MG: 600 INJECTION, SOLUTION INTRAVENOUS at 21:33

## 2019-05-15 RX ADMIN — NITROGLYCERIN 1 INCH: 20 OINTMENT TOPICAL at 18:39

## 2019-05-15 RX ADMIN — SODIUM CHLORIDE, POTASSIUM CHLORIDE, SODIUM LACTATE AND CALCIUM CHLORIDE 9 ML/HR: 600; 310; 30; 20 INJECTION, SOLUTION INTRAVENOUS at 10:56

## 2019-05-15 RX ADMIN — TECHNETIUM TC 99M SULFUR COLLOID 1 DOSE: KIT at 11:48

## 2019-05-15 RX ADMIN — CYCLOBENZAPRINE HYDROCHLORIDE 5 MG: 10 TABLET, FILM COATED ORAL at 16:15

## 2019-05-15 RX ADMIN — CYCLOBENZAPRINE 10 MG: 10 TABLET, FILM COATED ORAL at 12:04

## 2019-05-15 RX ADMIN — FENTANYL CITRATE 50 MCG: 50 INJECTION INTRAMUSCULAR; INTRAVENOUS at 13:43

## 2019-05-15 RX ADMIN — LIDOCAINE HYDROCHLORIDE 60 MG: 20 INJECTION, SOLUTION INFILTRATION; PERINEURAL at 12:35

## 2019-05-15 RX ADMIN — DEXAMETHASONE SODIUM PHOSPHATE 8 MG: 4 INJECTION, SOLUTION INTRAMUSCULAR; INTRAVENOUS at 10:54

## 2019-05-15 RX ADMIN — FENTANYL CITRATE 50 MCG: 50 INJECTION, SOLUTION INTRAMUSCULAR; INTRAVENOUS at 16:23

## 2019-05-15 RX ADMIN — MIDAZOLAM 1 MG: 1 INJECTION INTRAMUSCULAR; INTRAVENOUS at 12:07

## 2019-05-15 RX ADMIN — ONDANSETRON 4 MG: 2 INJECTION INTRAMUSCULAR; INTRAVENOUS at 15:05

## 2019-05-15 RX ADMIN — ROCURONIUM BROMIDE 50 MG: 10 INJECTION INTRAVENOUS at 12:35

## 2019-05-15 RX ADMIN — SCOPALAMINE 1 PATCH: 1 PATCH, EXTENDED RELEASE TRANSDERMAL at 10:45

## 2019-05-15 RX ADMIN — PROPOFOL 130 MG: 10 INJECTION, EMULSION INTRAVENOUS at 12:35

## 2019-05-15 RX ADMIN — CEFAZOLIN SODIUM 2 G: 2 INJECTION, SOLUTION INTRAVENOUS at 12:42

## 2019-05-15 RX ADMIN — ROCURONIUM BROMIDE 20 MG: 10 INJECTION INTRAVENOUS at 13:27

## 2019-05-15 RX ADMIN — FENTANYL CITRATE 100 MCG: 50 INJECTION INTRAMUSCULAR; INTRAVENOUS at 12:34

## 2019-05-15 RX ADMIN — CLINDAMYCIN PHOSPHATE 900 MG: 900 INJECTION INTRAVENOUS at 12:50

## 2019-05-15 RX ADMIN — DIAZEPAM 10 MG: 5 TABLET ORAL at 10:44

## 2019-05-15 RX ADMIN — FENTANYL CITRATE 50 MCG: 50 INJECTION INTRAMUSCULAR; INTRAVENOUS at 13:27

## 2019-05-15 RX ADMIN — LIDOCAINE AND PRILOCAINE: 25; 25 CREAM TOPICAL at 10:18

## 2019-05-15 RX ADMIN — FENTANYL CITRATE 50 MCG: 50 INJECTION INTRAMUSCULAR; INTRAVENOUS at 13:53

## 2019-05-15 RX ADMIN — DOCUSATE SODIUM 100 MG: 100 CAPSULE, LIQUID FILLED ORAL at 20:07

## 2019-05-15 RX ADMIN — HYDROXYZINE PAMOATE 50 MG: 50 CAPSULE ORAL at 12:04

## 2019-05-15 RX ADMIN — Medication 500 MG: at 20:07

## 2019-05-15 RX ADMIN — POLYETHYLENE GLYCOL 3350 17 G: 17 POWDER, FOR SOLUTION ORAL at 18:38

## 2019-05-15 RX ADMIN — EPHEDRINE SULFATE 5 MG: 50 INJECTION INTRAMUSCULAR; INTRAVENOUS; SUBCUTANEOUS at 13:05

## 2019-05-15 RX ADMIN — SODIUM CHLORIDE, POTASSIUM CHLORIDE, SODIUM LACTATE AND CALCIUM CHLORIDE 100 ML/HR: 600; 310; 30; 20 INJECTION, SOLUTION INTRAVENOUS at 18:41

## 2019-05-15 RX ADMIN — ACETAMINOPHEN 975 MG: 325 TABLET, FILM COATED ORAL at 10:49

## 2019-05-15 RX ADMIN — FAMOTIDINE 20 MG: 10 INJECTION INTRAVENOUS at 12:07

## 2019-05-15 NOTE — ANESTHESIA PROCEDURE NOTES
Airway  Urgency: elective    Airway not difficult    General Information and Staff    Patient location during procedure: OR  CRNA: Nedra Kraus CRNA    Indications and Patient Condition  Indications for airway management: airway protection    Preoxygenated: yes  Mask difficulty assessment: 1 - vent by mask    Final Airway Details  Final airway type: endotracheal airway      Successful airway: ETT  Cuffed: yes   Successful intubation technique: direct laryngoscopy  Facilitating devices/methods: Bougie  Endotracheal tube insertion site: oral  Blade: Hansa  Blade size: 3  ETT size (mm): 7.0  Cormack-Lehane Classification: grade IIb - view of arytenoids or posterior of glottis only  Placement verified by: chest auscultation and capnometry   Cuff volume (mL): 6  Measured from: lips  ETT to lips (cm): 20  Number of attempts at approach: 1    Additional Comments  Smooth IV induction. Trachea intubated. Cuff up. Ett secured. BEBS. Dentition intact without injury.

## 2019-05-15 NOTE — OP NOTE
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 Lozano recommended mastectomy and the patient desires reconstruction she stressed to me she wants a modest reconstruction not as big as she is and we plan to place a small 375 cc expander and will not reconstruct her to the size she is presently in this is her desire.  Dr. Prince commenced with the mastectomy and will dictate this under separate cover.  She is reviewed the informed consent understanding expanders get infected they have to be removed we will serially expand the expander and then remove it at a separate surgery and place a saline implant and lift and reduce the contralateral breast at that time if she still desires that.  Following her mastectomy her breast tissue was weighed and it weighed 552 g.  Procedure: Patient's had her mastectomy and sentinel node biopsies of the wound on the right side by Dr. Prince we irrigated with antibiotic containing saline additionally and painted with additional Betadine in place insert sterile surgical towels using a lighted retractor and a Bovie we developed a subpectoral pocket leaving the fascial community connections intact in the inferior medial aspect of the pectoral muscle but releasing the muscle fibers themselves after developing this portion of the pocket it was clear that she would need additional coverage for the expander as the tissues were inadequate to cover the expander she had very thin skin as well but appeared  healthy and viable we opened a medium contour perforated piece of AlloDerm and this was soaked in 2 separate bags of sterile normal saline and then our triple antibiotic containing saline with dilute Betadine we squeegee this out.  We sutured the skin with 2-0 PDS along the inframammary crease in the lateral breast side while closing the lateral dissection as well tacking the lateral flap to the AlloDerm closure to the serratus fascia.  We placed 15 Hungarian Garry drain in the deep pocket 15 Hungarian Garry drain outside between the inferior flap and the AlloDerm and a 10 Hungarian axion beneath superior flap all of these were secured with nylon sutures were irrigated with triple antibiotic containing saline and 50% Betadine we injected a mixture of 20 cc of Exparel mixed with 20 cc of sterile normal saline injected this in the serratus musculature in the inter-pectoral space and medially and superiorly and inferiorly for postoperative analgesia using a fresh pair powder free gloves we opened a 350 cc Tomas [place] expanders free from any gross defects it was always handled with fresh for powder free gloves we evacuated all the air from the device and carefully placed it into the pocket you placing it with out touching it to the skin whatsoever we position it appropriately and sutured the lateral tabs with 2-0 PDS sutures to secure it using a closed system technique we filled it with 50 cc of saline this gave no tension to the closure.  We tailored the AlloDerm and carefully completed closure of the AlloDerm to the free edge of the pectoral muscle inferiorly up laterally toward the axilla with running 2-0 PDS for complete coverage of the expander with the AlloDerm and muscle.  The drains were positioned appropriately we irrigated with additional antibiotic containing saline hemostasis was excellent we tacked the inferior flap in several locations to the AlloDerm with 2-0 Vicryl sutures the skin edges came together nicely  there is no evidence of any tension on the closure and the skin edges appeared quite healthy and viable and we closed with multiple 4-0 Vicryls and 5-0 PDS we placed Dermabond glue across the incisions and then used nitroglycerin paste on the skin edges as a preventative measure.  Biopatch is gauze and tape were applied at the drain exit sites Telfa over the skin and nitroglycerin at the incision site and then ABD pads and a lightly applied 6 inch Ace wrap for gentle compression we irrigated her drains with triple antibiotic containing saline dilute Betadine and 30 cc of half percent ropivacaine this was evacuated at the end of the case she tolerated the procedure well and went to recovery in satisfactory condition.

## 2019-05-15 NOTE — PLAN OF CARE
Problem: Patient Care Overview  Goal: Plan of Care Review  Outcome: Ongoing (interventions implemented as appropriate)   05/15/19 7560   Coping/Psychosocial   Plan of Care Reviewed With patient   Plan of Care Review   Progress no change   OTHER   Outcome Summary admit from pacu vss , b/p 82/49 no lightheadedness or dizziness. pain well controlled, juan carlos sips cl liqs without nausea      Goal: Individualization and Mutuality  Outcome: Ongoing (interventions implemented as appropriate)    Goal: Discharge Needs Assessment  Outcome: Ongoing (interventions implemented as appropriate)    Goal: Interprofessional Rounds/Family Conf  Outcome: Ongoing (interventions implemented as appropriate)      Problem: Breast Surgery/Reconstruction (Adult)  Goal: Signs and Symptoms of Listed Potential Problems Will be Absent, Minimized or Managed (Breast Surgery/Reconstruction)  Outcome: Ongoing (interventions implemented as appropriate)    Goal: Anesthesia/Sedation Recovery  Outcome: Ongoing (interventions implemented as appropriate)

## 2019-05-15 NOTE — ANESTHESIA PREPROCEDURE EVALUATION
Anesthesia Evaluation     Patient summary reviewed and Nursing notes reviewed                Airway   Mallampati: II  Dental      Pulmonary - negative pulmonary ROS   Cardiovascular     ECG reviewed  Rhythm: regular  Rate: normal    (+) hyperlipidemia,       Neuro/Psych- negative ROS  GI/Hepatic/Renal/Endo - negative ROS     Musculoskeletal     Abdominal    Substance History - negative use     OB/GYN negative ob/gyn ROS         Other   (+) arthritis   history of cancer                    Anesthesia Plan    ASA 2     general     intravenous induction   Anesthetic plan, all risks, benefits, and alternatives have been provided, discussed and informed consent has been obtained with: patient.

## 2019-05-15 NOTE — ANESTHESIA POSTPROCEDURE EVALUATION
Patient: Gayatri Lee    Procedure Summary     Date:  05/15/19 Room / Location:  [facility] OR 02 / [facility] MAIN OR    Anesthesia Start:  1231 Anesthesia Stop:  1539    Procedures:       RIGHT BREAST MASTECTOMY WITH SENTINEL NODE BIOPSY (Right Breast)      RIGHT PLACEMENT OF TISSUE EXPANDER WITH ALLODERM (Right Breast) Diagnosis:       Ductal carcinoma in situ (DCIS) of right breast      (Ductal carcinoma in situ (DCIS) of right breast [D05.11])    Surgeon:  Daniel Denny MD; ROCK Mathur MD Provider:  Elijah Jung MD    Anesthesia Type:  general ASA Status:  2          Anesthesia Type: general  Last vitals  BP   102/50 (05/15/19 1605)   Temp   37.1 °C (98.7 °F) (05/15/19 1536)   Pulse   88 (05/15/19 1605)   Resp   16 (05/15/19 1605)     SpO2   97 % (05/15/19 1605)     Post Anesthesia Care and Evaluation    Patient location during evaluation: PACU  Patient participation: complete - patient participated  Level of consciousness: awake and alert  Pain score: 0  Pain management: adequate  Airway patency: patent  Anesthetic complications: No anesthetic complications    Cardiovascular status: acceptable  Respiratory status: acceptable  Hydration status: acceptable    Comments: /50   Pulse 88   Temp 37.1 °C (98.7 °F) (Oral)   Resp 16   Wt 53.9 kg (118 lb 13.3 oz)   SpO2 97%   Breastfeeding? No   BMI 20.40 kg/m²

## 2019-05-15 NOTE — OP NOTE
Name: Gayatri Lee  Age: 69 y.o.  Sex: female  :  1950  MRN: [id number]    Mastectomy with SN Procedure Note    Indications: This patient presents with a large area of DCIS involving 10 cm or so of the right breast.  She is not a candidate for lumpectomy and therefore is here for a total mastectomy with immediate reconstruction.    Pre-operative Diagnosis: breast cancer, right    Post-operative Diagnosis: same    Procedure:  right total mastectomy, [place] Node    Surgeon: Daniel Denny MD, FACS    Assistants: REENA Jain    Anesthesia: General anesthesia      Procedure Details   The patient was seen again in the Holding Room. The risks, benefits, indications, potential complications, treatment options, and expected outcomes were discussed with the patient. The possibilities of reaction to medication, pulmonary aspiration, bleeding, recurrent infection, the need for additional procedures, failure to diagnose a condition, and creating a complication requiring transfusion or further operation were discussed with the patient. The patient and/or family concurred with the proposed plan, giving informed consent. The site of surgery was properly noted/marked.    The patient was also taken to the nuclear med department where a radioisotope injection was performed in the periareolar area of the affected breast  in the usual fashion.       The patient was taken to the Operating Room, identified as Gayatri Lee  and the procedure verified as right total mastectomy with sentinel lymph node biopsy.   A Time Out was held and the above information confirmed.          The patient was placed supine and general anesthetic was administered.     3 cc of blue dye were injected into the breast near the edge of the areola.  The  arm, breast, and chest were prepped and draped in standard fashion.   An oblique elliptical incision was made encompassing the nipple of the .right breast.  . Skin flaps were created  meticulously to preserve the subdermal blood supply.  Flaps were developed to the clavicle, rectus sheath, sternum, and anterior edge of the latissimus dorsi m.  The glandular tissues seemed fairly close to the skin of the lower flap.  There was one area that was particularly close.  After creating her plane I went back and took a section of tissue off the lower flap is an anterior margin.  The frozen section on this was benign.    [place] node evaluation was performed. 4 deep sentinel node(s) was/were found and removed.  The lymph node with the highest counts was 1600.  The first 3 sentinel nodes were blue but the last one was not.. No other nodes were found with counts over 160 and there were no other blue nodes.  Frozen section was performed and all 4 sentinel lymph nodes were benign.     Dissection was carried down to the pectoralis fascia, which was included with the specimen, and an axillary dissection  was not performed.,       The specimen was submitted to pathology. The wound was irrigated.     The case was then turned over to Dr. Mathur who will dictate his portion of the case.    Instrument, sponge, and needle counts were correct at the conclusion of my portion of the case.  Findings: There were no unexpected findings.    Estimated Blood Loss: less than 50 mL                 Specimens:   ID Type Source Tests Collected by Time   A : Lower flap anterior margin; stitch marks new margin Tissue Breast, Right TISSUE PATHOLOGY EXAM Daniel Denny MD 5/15/2019 1339   B : right [place] node #1 Tissue [place] Lymph Node TISSUE PATHOLOGY EXAM Daniel Denny MD 5/15/2019 1351   C : right [place] node #2 Tissue [place] Lymph Node TISSUE PATHOLOGY EXAM Daniel Denny MD 5/15/2019 1351   D : Right Breast [place] node #3 Tissue [place] Lymph Node TISSUE PATHOLOGY EXAM Daniel Denny MD 5/15/2019 1354   E : Right Breast [place] node #4 Tissue [place] Lymph Node TISSUE PATHOLOGY EXAM  Daniel Denny MD 5/15/2019 1354   F : Right breast; stitch marks 12 o'clock Tissue Breast, Right TISSUE PATHOLOGY EXAM Daniel Denny MD 5/15/2019 1404       Complications: None; patient tolerated the procedure well.           Disposition: PACU - hemodynamically stable.           Condition: stable

## 2019-05-16 VITALS
OXYGEN SATURATION: 97 % | WEIGHT: 118.83 LBS | RESPIRATION RATE: 16 BRPM | BODY MASS INDEX: 20.4 KG/M2 | SYSTOLIC BLOOD PRESSURE: 97 MMHG | HEART RATE: 62 BPM | DIASTOLIC BLOOD PRESSURE: 54 MMHG | TEMPERATURE: 98.1 F

## 2019-05-16 PROCEDURE — 63710000001 CYCLOBENZAPRINE 10 MG TABLET: Performed by: SURGERY

## 2019-05-16 PROCEDURE — 63710000001 SACCHAROMYCES BOULARDII 250 MG CAPSULE: Performed by: SURGERY

## 2019-05-16 PROCEDURE — A9270 NON-COVERED ITEM OR SERVICE: HCPCS | Performed by: SURGERY

## 2019-05-16 PROCEDURE — 99024 POSTOP FOLLOW-UP VISIT: CPT | Performed by: SURGERY

## 2019-05-16 PROCEDURE — 63710000001 POLYETHYLENE GLYCOL PACK: Performed by: SURGERY

## 2019-05-16 PROCEDURE — 63710000001 DOCUSATE SODIUM 100 MG CAPSULE: Performed by: SURGERY

## 2019-05-16 PROCEDURE — 63710000001 NITROGLYCERIN 2 % OINTMENT: Performed by: SURGERY

## 2019-05-16 PROCEDURE — 63710000001 OXYCODONE-ACETAMINOPHEN 5-325 MG TABLET: Performed by: SURGERY

## 2019-05-16 RX ADMIN — NITROGLYCERIN 0.5 INCH: 20 OINTMENT TOPICAL at 00:19

## 2019-05-16 RX ADMIN — POLYETHYLENE GLYCOL 3350 17 G: 17 POWDER, FOR SOLUTION ORAL at 08:25

## 2019-05-16 RX ADMIN — OXYCODONE HYDROCHLORIDE AND ACETAMINOPHEN 1 TABLET: 5; 325 TABLET ORAL at 05:17

## 2019-05-16 RX ADMIN — CYCLOBENZAPRINE 5 MG: 10 TABLET, FILM COATED ORAL at 00:31

## 2019-05-16 RX ADMIN — CLINDAMYCIN PHOSPHATE 600 MG: 600 INJECTION, SOLUTION INTRAVENOUS at 05:06

## 2019-05-16 RX ADMIN — SODIUM CHLORIDE, POTASSIUM CHLORIDE, SODIUM LACTATE AND CALCIUM CHLORIDE 100 ML/HR: 600; 310; 30; 20 INJECTION, SOLUTION INTRAVENOUS at 06:11

## 2019-05-16 RX ADMIN — DOCUSATE SODIUM 100 MG: 100 CAPSULE, LIQUID FILLED ORAL at 08:25

## 2019-05-16 RX ADMIN — Medication 500 MG: at 08:25

## 2019-05-16 NOTE — PROGRESS NOTES
she has done well overnight the mastectomy skin appears to be healing appropriately and with good circulation and no tension.  Drains are appropriate no evidence of hematoma I have reviewed discharge instructions with the patient and written these out.  She is to follow-up with me tomorrow in my office for her first visit her diet is regular her discharge medications include Phenergan as needed for nausea doxycycline twice a day Bactroban ointment to the drain sites and any blistered skin Flexeril  every 12 hours as needed pain Percocet as needed for pain she is also recommended to use stool softeners and laxatives as needed to keep her bowels moving.

## 2019-05-16 NOTE — DISCHARGE SUMMARY
Assessment/Plan  she has done well overnight the mastectomy skin appears to be healing appropriately and with good circulation and no tension.  Drains are appropriate no evidence of hematoma.  She is for discharge today.  I have reviewed discharge instructions with the patient and written these out.  She is to follow-up with me tomorrow in my office for her first visit her diet is regular her discharge medications include Phenergan as needed for nausea doxycycline twice a day Bactroban ointment to the drain sites and any blistered skin Flexeril  every 12 hours as needed pain Percocet as needed for pain she is also recommended to use stool softeners and laxatives as needed to keep her bowels moving.  She is to call me or my office if she has any problems or concerns she is also to follow-up with Dr. Bethea as per his directions    Subjective Patient is postoperative day 1 from mastectomy and sentinel node biopsy for a diagnosis of DCIS of right breast by Dr. Mike Denny and immediate reconstruction with subpectoral tissue expander and AlloDerm by Dr. Fuad Mathur MD     Temp:  [98 °F (36.7 °C)-99 °F (37.2 °C)] 98.1 °F (36.7 °C)  Heart Rate:  [] 62  Resp:  [16-18] 16  BP: ()/(43-64) 97/54  I/O last 3 completed shifts:  In: 2650 [P.O.:400; I.V.:2250]  Out: 1504 [Urine:1350; Drains:104; Blood:50]  No intake/output data recorded.    Objective  She is doing well skin is healing appropriately no evidence of hematoma drains are appropriate calves are soft and nontender she is alert she is tolerating pain medications well pathology is pending and she is doing well on postoperative day 1 following her right mastectomy sentinel node biopsy tissue expander and AlloDerm placement in a subpectoral position    Ductal carcinoma in situ (DCIS) of right breast

## 2019-05-16 NOTE — PROGRESS NOTES
Postoperative rounding note breast surgery    Postoperative day: 1     Overnight events: He has done quite well overnight.  Her pain is controlled by oral pain medication.  She is tolerating her diet and has ambulated.    Vitals:    05/16/19 0454   BP: 97/54   Pulse: 62   Resp: 16   Temp: 98.1 °F (36.7 °C)   SpO2: 97%       Examination:     The patient is alert and oriented.  Her Ace bandage is in place and her dressings/incisions are clean dry and intact.  There are no undrained fluid collections underneath her mastectomy flaps.  Her drains are serosanguineous.  Extremities-no calf tenderness or edema  Assessment:  Postoperative day 1 doing well.     We'll plan to saline lock her IV fluids.  We will have her complete her drain teaching.  We will make sure that she is adequately ambulating and tolerates her breakfast and/or lunch.  She may go home later today.    Her postoperative wound care and activity instructions will be given by her plastic surgeon, Hilton Mathur MD.    She already has a follow-up visit with me scheduled.  I asked her to call the office if she needs a reminder on the date and time.  I will also call her when the pathology report is available.

## 2019-05-16 NOTE — PROGRESS NOTES
Assessment/Plan Patient doing well postoperative day 1 from mastectomy for DCIS of right breast and immediate reconstruction with tissue expander and AlloDerm plan is for discharge patient to be seen by Dr. Mathur tomorrow and Dr. Denny next week.  Patient to receive written and verbal instructions prior to discharge I have gone over these with her myself and the nurses will reinforce this.    Subjective No complaints patient tolerating medications well    Temp:  [98 °F (36.7 °C)-99 °F (37.2 °C)] 98.1 °F (36.7 °C)  Heart Rate:  [] 62  Resp:  [16-18] 16  BP: ()/(43-64) 97/54  I/O last 3 completed shifts:  In: 2650 [P.O.:400; I.V.:2250]  Out: 1504 [Urine:1350; Drains:104; Blood:50]  No intake/output data recorded.    Objective Operative sites look good no evidence of hematoma skin is viable and healthy drains appropriate calves soft and nontender      Ductal carcinoma in situ (DCIS) of right breast

## 2019-05-16 NOTE — PROGRESS NOTES
Discharge Planning Assessment  [facility]     Patient Name: Gayatri Lee  MRN: [id number]  Today's Date: 5/16/2019    Admit Date: 5/15/2019    Discharge Needs Assessment    No documentation.       Discharge Plan     Row Name 05/16/19 0824       Plan    Final Discharge Disposition Code  01 - home or self-care     Expected Discharge Date and Time     Expected Discharge Date Expected Discharge Time    May 16, 2019    Ann Roque RN

## 2019-05-16 NOTE — PLAN OF CARE
Problem: Patient Care Overview  Goal: Plan of Care Review  Outcome: Ongoing (interventions implemented as appropriate)   05/16/19 0427   Coping/Psychosocial   Plan of Care Reviewed With patient   Plan of Care Review   Progress improving   OTHER   Outcome Summary Afebrile. BP low. IV Fluids on flow. With mild tolerable pain. Percocet 1 Tablet given. Incision site clean, no redness , no swelling, Nitropaste applied. Arm sling utilized. RACIEL output small amount .Compliant to use of IS. Clear liquids+ crackers tolerated. Ambulated in the amezcua. Slept at long intervals.      Goal: Individualization and Mutuality  Outcome: Ongoing (interventions implemented as appropriate)    Goal: Discharge Needs Assessment  Outcome: Ongoing (interventions implemented as appropriate)    Goal: Interprofessional Rounds/Family Conf  Outcome: Ongoing (interventions implemented as appropriate)      Problem: Breast Surgery/Reconstruction (Adult)  Goal: Signs and Symptoms of Listed Potential Problems Will be Absent, Minimized or Managed (Breast Surgery/Reconstruction)  Outcome: Ongoing (interventions implemented as appropriate)    Goal: Anesthesia/Sedation Recovery  Outcome: Ongoing (interventions implemented as appropriate)

## 2019-05-20 ENCOUNTER — TELEPHONE (OUTPATIENT)
Dept: MAMMOGRAPHY | Facility: CLINIC | Age: 69
End: 2019-05-20

## 2019-05-20 LAB
CYTO UR: NORMAL
LAB AP CASE REPORT: NORMAL
LAB AP DIAGNOSIS COMMENT: NORMAL
LAB AP SPECIAL STAINS: NORMAL
LAB AP SYNOPTIC CHECKLIST: NORMAL
Lab: NORMAL
PATH REPORT.FINAL DX SPEC: NORMAL
PATH REPORT.GROSS SPEC: NORMAL

## 2019-05-20 NOTE — TELEPHONE ENCOUNTER
I told her the path report showed no invasive disease, clear margins, and negative sentinel lymph nodes.

## 2019-05-28 ENCOUNTER — OFFICE VISIT (OUTPATIENT)
Dept: MAMMOGRAPHY | Facility: CLINIC | Age: 69
End: 2019-05-28

## 2019-05-28 VITALS
DIASTOLIC BLOOD PRESSURE: 70 MMHG | SYSTOLIC BLOOD PRESSURE: 118 MMHG | HEART RATE: 64 BPM | OXYGEN SATURATION: 98 % | TEMPERATURE: 98.1 F

## 2019-05-28 DIAGNOSIS — D05.11 DUCTAL CARCINOMA IN SITU (DCIS) OF RIGHT BREAST: Primary | ICD-10-CM

## 2019-05-28 PROCEDURE — 99024 POSTOP FOLLOW-UP VISIT: CPT | Performed by: SURGERY

## 2019-05-28 NOTE — PROGRESS NOTES
Chief Complaint: Gayatri Lee is a  69 y.o. female, initially referred by No ref. provider found , who is here today for a postoperative visit.    History of Present Illness:  In the interim,Gayatri Lee has had the following procedure and resultant pathology report: She has undergone a right mastectomy with immediate reconstruction.  Her path report reveals for negative sentinel lymph nodes.  She did not have any invasive cancer detected although there was extensive DCIS.  Her closest margin was 6 mm.  She does complain of some discomfort in the lateral aspect of the breast extending up into the axilla.    She has noted no redness, warmth,drainage, swelling at the incision site. Denies fever or chills.      Current Outpatient Medications:   •  Ascorbic Acid (VITAMIN C) 500 MG capsule, Take 135 mg by mouth Daily., Disp: , Rfl:   •  CALCIUM LACTATE PO, Take 1,000 mg by mouth Daily., Disp: , Rfl:   •  cholecalciferol (VITAMIN D3) 1000 units tablet, Take 1,000 Units by mouth Daily., Disp: , Rfl:   •  Cyanocobalamin (VITAMIN B-12 PO), Take 6 mcg by mouth Daily., Disp: , Rfl:   •  cyclobenzaprine (FLEXERIL) 5 MG tablet, Take 0.5-1 tablets by mouth Every 12 (Twelve) Hours As Needed for pain. (Take with food and use as little as possible), Disp: 30 tablet, Rfl: 0  •  doxycycline (MONODOX) 100 MG capsule, Take 1 capsule by mouth Every 12 (Twelve) Hours. (Take with food), Disp: 30 capsule, Rfl: 0  •  folic acid (FOLVITE) 400 MCG tablet, Take 400 mcg by mouth Daily., Disp: , Rfl:   •  magnesium oxide (MAG-OX) 400 tablet tablet, Take 400 mg by mouth Daily., Disp: , Rfl:   •  mupirocin (BACTROBAN) 2 % ointment, Apply daily to drain exit sites with 2x2 gauze, Disp: 22 g, Rfl: 2  •  Niacin (VITAMIN B-3 PO), Take 25 mg by mouth Daily., Disp: , Rfl:   •  NON FORMULARY, 60 mg Daily. maqui berry extract 60mg daily for dry eye , Disp: , Rfl:   •  Nutritional Supplements (PYCNOGENOL PO), Take 100 mg by mouth Daily., Disp: , Rfl:   •   oxyCODONE-acetaminophen (PERCOCET) 5-325 MG per tablet, Take 1-2 tablets by mouth Every 4-6 Hours As Needed for pain. (Take with food and use as little as possible), Disp: 40 tablet, Rfl: 0  •  Probiotic Product (PROBIOTIC ADVANCED PO), Take 1 tablet by mouth Daily., Disp: , Rfl:   •  promethazine (PHENERGAN) 25 MG tablet, Take 0.5 tablets by mouth Every 4 (Four) Hours As Needed for nausea or vomiting, Disp: 15 tablet, Rfl: 0  •  VITAMIN A PO, Take 1,250 Units by mouth Daily., Disp: , Rfl:   Physical examination  Right breast- the incision itself looks fine without any signs of infection.  She does have a drain in place.  Assessment:  DCIS right breast status post mastectomy with immediate reconstruction.  She is doing well from her surgery.  I think her discomfort is related to her lymph node surgery along with retraction of the pectoralis muscle from placement of the implant as well as perhaps the AlloDerm mesh that was sutured in place.  She does have an appointment to see the medical oncologists.    Plan:  I will see her back 7 to 10 days after she is given permission to do the wall walking exercises.          EMR Dragon/transcription disclaimer:    Much of this encounter note is an electronic transcription/translocation of spoken language to printed text.  The electronic translation of spoken language may permit erroneous, or at times, nonsensical words or phrases to be inadvertently transcribed.  Although I have reviewed the note from such areas, some may still exist.

## 2019-06-13 ENCOUNTER — APPOINTMENT (OUTPATIENT)
Dept: ONCOLOGY | Facility: CLINIC | Age: 69
End: 2019-06-13

## 2019-06-13 ENCOUNTER — CONSULT (OUTPATIENT)
Dept: ONCOLOGY | Facility: CLINIC | Age: 69
End: 2019-06-13

## 2019-06-13 ENCOUNTER — APPOINTMENT (OUTPATIENT)
Dept: LAB | Facility: HOSPITAL | Age: 69
End: 2019-06-13

## 2019-06-13 ENCOUNTER — CLINICAL SUPPORT (OUTPATIENT)
Dept: ONCOLOGY | Facility: CLINIC | Age: 69
End: 2019-06-13

## 2019-06-13 VITALS
HEART RATE: 76 BPM | RESPIRATION RATE: 16 BRPM | OXYGEN SATURATION: 98 % | WEIGHT: 121.8 LBS | TEMPERATURE: 97.9 F | DIASTOLIC BLOOD PRESSURE: 72 MMHG | HEIGHT: 64 IN | BODY MASS INDEX: 20.79 KG/M2 | SYSTOLIC BLOOD PRESSURE: 116 MMHG

## 2019-06-13 DIAGNOSIS — N63.10 MASS OF BREAST, RIGHT: Primary | ICD-10-CM

## 2019-06-13 DIAGNOSIS — D05.11 DUCTAL CARCINOMA IN SITU (DCIS) OF RIGHT BREAST: Primary | ICD-10-CM

## 2019-06-13 DIAGNOSIS — N61.0 CELLULITIS OF RIGHT BREAST: ICD-10-CM

## 2019-06-13 LAB
BASOPHILS # BLD AUTO: 0.1 10*3/MM3 (ref 0–0.2)
BASOPHILS NFR BLD AUTO: 1.6 % (ref 0–1.5)
DEPRECATED RDW RBC AUTO: 40.2 FL (ref 37–54)
EOSINOPHIL # BLD AUTO: 0.58 10*3/MM3 (ref 0–0.4)
EOSINOPHIL NFR BLD AUTO: 9.5 % (ref 0.3–6.2)
ERYTHROCYTE [DISTWIDTH] IN BLOOD BY AUTOMATED COUNT: 12.6 % (ref 12.3–15.4)
HCT VFR BLD AUTO: 39.7 % (ref 34–46.6)
HGB BLD-MCNC: 13.3 G/DL (ref 12–15.9)
IMM GRANULOCYTES # BLD AUTO: 0.03 10*3/MM3 (ref 0–0.05)
IMM GRANULOCYTES NFR BLD AUTO: 0.5 % (ref 0–0.5)
LYMPHOCYTES # BLD AUTO: 1.78 10*3/MM3 (ref 0.7–3.1)
LYMPHOCYTES NFR BLD AUTO: 29 % (ref 19.6–45.3)
MCH RBC QN AUTO: 29.2 PG (ref 26.6–33)
MCHC RBC AUTO-ENTMCNC: 33.5 G/DL (ref 31.5–35.7)
MCV RBC AUTO: 87.1 FL (ref 79–97)
MONOCYTES # BLD AUTO: 0.51 10*3/MM3 (ref 0.1–0.9)
MONOCYTES NFR BLD AUTO: 8.3 % (ref 5–12)
NEUTROPHILS # BLD AUTO: 3.13 10*3/MM3 (ref 1.7–7)
NEUTROPHILS NFR BLD AUTO: 51.1 % (ref 42.7–76)
NRBC BLD AUTO-RTO: 0 /100 WBC (ref 0–0.2)
PLATELET # BLD AUTO: 180 10*3/MM3 (ref 140–450)
PMV BLD AUTO: 10.5 FL (ref 6–12)
RBC # BLD AUTO: 4.56 10*6/MM3 (ref 3.77–5.28)
WBC NRBC COR # BLD: 6.13 10*3/MM3 (ref 3.4–10.8)

## 2019-06-13 PROCEDURE — 36415 COLL VENOUS BLD VENIPUNCTURE: CPT | Performed by: INTERNAL MEDICINE

## 2019-06-13 PROCEDURE — 99205 OFFICE O/P NEW HI 60 MIN: CPT | Performed by: INTERNAL MEDICINE

## 2019-06-13 PROCEDURE — 85025 COMPLETE CBC W/AUTO DIFF WBC: CPT | Performed by: INTERNAL MEDICINE

## 2019-06-13 RX ORDER — TAMOXIFEN CITRATE 20 MG/1
20 TABLET ORAL DAILY
Qty: 90 TABLET | Refills: 3 | Status: SHIPPED | OUTPATIENT
Start: 2019-06-13 | End: 2019-09-11

## 2019-06-13 RX ORDER — GABAPENTIN 100 MG/1
CAPSULE ORAL 2 TIMES DAILY
Refills: 0 | COMMUNITY
Start: 2019-06-03 | End: 2019-09-16

## 2019-06-13 NOTE — PROGRESS NOTES
Subjective     REASON FOR CONSULTATION: Extensive DCIS right breast not mammographically detected low-grade ER positive MN negative post mastectomy and sentinel node biopsy  Provide an opinion on any further workup or treatment                             REQUESTING PHYSICIAN:   Camilo ROMERO    RECORDS OBTAINED:  Records of the patients history including those obtained from the referring provider were reviewed and summarized in detail.    HISTORY OF PRESENT ILLNESS:  The patient is a 69 y.o. year old female who is here for an opinion about the above issue.    History of Present Illness patient is a 69-year-old female with no chronic medical illnesses but significant problems over the years with fibrocystic breast disease requiring cyst aspirations and a previous biopsy of her left breast in 1976 and then again in 2004 when she had a lumpectomy on the left.  She was admitted to the hospital with cellulitis in the right breast and a CAT scan of the chest was done which showed significant enlargement of the right breast compared to the left worrisome for underlying pathology.  She had a mammogram on 11/19/2018 which was not suspicious.Patient underwent ultrasound-guided biopsy on 3/14/2019 which showed low-grade DCIS ER 25% MN negative.  Patient then had bilateral breast MRIs on 4/11/2019 and this showed fairly extensive enhancement in the right breast suspicious for extensive DCIS with no abnormality in the left breast detected.  Based on these findings the patient opted to have a mastectomy with immediate reconstruction which was done on 5/15/2019,\pathology revealed a 10 x 9 cm area of low-grade DCIS with clear margins there is micropapillary and papillary subtypes with low to intermediate nuclear grade with no necrosis.  4 sentinel nodes were negative for metastatic disease    Postoperatively she has had a lot of pain from her implant but is not been good about taking her pain medicines and is suffering because of  this.  She is  3 para 3 menarche was at age 14 menopause 15 years ago.  She has since had a hysterectomy and one ovary removed at the time of cystocele and rectocele repair in 2017.  First childbirth was at age 26.  Patient took Vagifem for urinary problems related to vaginal atrophy since  and for the last 2 years has been on progesterone replacement and then for the last year and estradiol patch all of which she discontinued when her DCIS was diagnosed 3 months ago    There is no family history of breast or ovarian cancer in her family mother had scleroderma.    Past Medical History:   Diagnosis Date   • Arthritis    • Basal cell carcinoma     followed by Dermatology   • Chronic gastritis    • DCIS (ductal carcinoma in situ)     followed by Dr. Denny   • Dry eye    • Fibrocystic breast    • History of colon polyps     followed by GI, Dr. Cuba   • History of endometriosis    • HSV-2 seropositive    • Hypercholesterolemia    • Hyperlipidemia    • Melanoma (CMS/HCC)    • Osteopenia     followed by Gynecology, Dr. Tequila Boyle   • Pelvic prolapse    • Prediabetes    • Recurrent sinusitis    • Tinnitus         Past Surgical History:   Procedure Laterality Date   • ABDOMINOPLASTY     • ADENOIDECTOMY     • BREAST RECONSTRUCTION Right 5/15/2019    Procedure: RIGHT PLACEMENT OF TISSUE EXPANDER WITH ALLODERM;  Surgeon: ROCK Mathur MD;  Location: [facility];  Service: Plastics   • BREAST SURGERY Left     LUMPECTOMY   • BREAST SURGERY Left     LUMPECTOMY   • BROW LIFT AND BLEPHAROPLASTY     • COLONOSCOPY  2013    TUBULAR ADENOMAS, TORT COLON,  DR. CUBA   • COLONOSCOPY W/ POLYPECTOMY     • CYSTOCELE REPAIR  2017   • DILATATION AND CURETTAGE     • ENDOSCOPY     • EYE SURGERY     • HAND SURGERY     • HYSTERECTOMY     • LAPAROTOMY OOPHERECTOMY     • LARYNGOSCOPY      IRRITATION   DR. CORREA   • MASTECTOMY W/ SENTINEL NODE BIOPSY Right 5/15/2019     Procedure: RIGHT BREAST MASTECTOMY WITH SENTINEL NODE BIOPSY;  Surgeon: Daniel Denny MD;  Location: [facility];  Service: General   • OOPHORECTOMY     • TONSILLECTOMY  1955   • TUBAL ABDOMINAL LIGATION  1980   • WISDOM TOOTH EXTRACTION  1970        Current Outpatient Medications on File Prior to Visit   Medication Sig Dispense Refill   • gabapentin (NEURONTIN) 100 MG capsule 2 (Two) Times a Day.  0   • Ascorbic Acid (VITAMIN C) 500 MG capsule Take 135 mg by mouth Daily.     • CALCIUM LACTATE PO Take 1,000 mg by mouth Daily.     • cholecalciferol (VITAMIN D3) 1000 units tablet Take 1,000 Units by mouth Daily.     • Cyanocobalamin (VITAMIN B-12 PO) Take 6 mcg by mouth Daily.     • cyclobenzaprine (FLEXERIL) 5 MG tablet Take 0.5-1 tablets by mouth Every 12 (Twelve) Hours As Needed for pain. (Take with food and use as little as possible) 30 tablet 0   • doxycycline (MONODOX) 100 MG capsule Take 1 capsule by mouth Every 12 (Twelve) Hours. (Take with food) 30 capsule 0   • folic acid (FOLVITE) 400 MCG tablet Take 400 mcg by mouth Daily.     • magnesium oxide (MAG-OX) 400 tablet tablet Take 400 mg by mouth Daily.     • mupirocin (BACTROBAN) 2 % ointment Apply daily to drain exit sites with 2x2 gauze 22 g 2   • Niacin (VITAMIN B-3 PO) Take 25 mg by mouth Daily.     • NON FORMULARY 60 mg Daily. maqui berry extract 60mg daily for dry eye      • Nutritional Supplements (PYCNOGENOL PO) Take 100 mg by mouth Daily.     • oxyCODONE-acetaminophen (PERCOCET) 5-325 MG per tablet Take 1-2 tablets by mouth Every 4-6 Hours As Needed for pain. (Take with food and use as little as possible) 40 tablet 0   • Probiotic Product (PROBIOTIC ADVANCED PO) Take 1 tablet by mouth Daily.     • promethazine (PHENERGAN) 25 MG tablet Take 0.5 tablets by mouth Every 4 (Four) Hours As Needed for nausea or vomiting 15 tablet 0   • VITAMIN A PO Take 1,250 Units by mouth Daily.       No current facility-administered medications on file prior to  "visit.         ALLERGIES:    Allergies   Allergen Reactions   • Sulfa Antibiotics Rash        Social History     Socioeconomic History   • Marital status:      Spouse name: Not on file   • Number of children: 3   • Years of education: Not on file   • Highest education level: Not on file   Occupational History     Employer: RETIRED   Tobacco Use   • Smoking status: Never Smoker   • Smokeless tobacco: Never Used   Substance and Sexual Activity   • Alcohol use: Yes     Comment: about once per month   • Drug use: No   • Sexual activity: Defer   Social History Narrative    Lives at home in a place of her own.  Enjoys tap and ballroom dance.          Family History   Problem Relation Age of Onset   • Cancer Father         Skin   • Heart disease Father    • Stroke Father    • Cancer Sister    • Macular degeneration Sister    • Arthritis Sister    • Alcohol abuse Mother    • Scleroderma Mother    • Malig Hyperthermia Neg Hx         Review of Systems   Respiratory: Positive for chest tightness (Due to expander).    Cardiovascular: Positive for chest pain (Due to expander).        Objective     Vitals:    06/13/19 1300   BP: 116/72   Pulse: 76   Resp: 16   Temp: 97.9 °F (36.6 °C)   SpO2: 98%   Weight: 55.2 kg (121 lb 12.8 oz)   Height: 163 cm (64.17\")   PainSc:   3   PainLoc: Back  Comment: back chest under right arm     Current Status 6/13/2019   ECOG score 0       Physical Exam   Pulmonary/Chest:           GENERAL:  Well-developed, well-nourished in no acute distress.   SKIN:  Warm, dry without rashes, purpura or petechiae.  EYES:  Pupils equal, round and reactive to light.  EOMs intact.  Conjunctivae normal.  EARS:  Hearing intact.  NOSE:  Septum midline.  No excoriations or nasal discharge.  MOUTH:  Tongue is well-papillated; no stomatitis or ulcers.  Lips normal.  THROAT:  Oropharynx without lesions or exudates.  NECK:  Supple with good range of motion; no thyromegaly or masses, no JVD.  LYMPHATICS:  No cervical, " supraclavicular, axillary or inguinal adenopathy.  CHEST:  Lungs clear to auscultation. Good airflow.  BREASTS: Right breast shows an expander in place with well-healed incisions the left breast shows some fibrocystic nodularity in the upper outer quadrant with no discrete mass  CARDIAC:  Regular rate and rhythm without murmurs, rubs or gallops. Normal S1,S2.  ABDOMEN:  Soft, nontender with no hepatosplenomegaly or masses.  EXTREMITIES:  No clubbing, cyanosis or edema.  NEUROLOGICAL:  Cranial Nerves II-XII grossly intact.  No focal neurological deficits.  PSYCHIATRIC:  Normal affect and mood.        RECENT LABS:  Hematology WBC   Date Value Ref Range Status   06/13/2019 6.13 3.40 - 10.80 10*3/mm3 Final   02/28/2019 5.41 3.40 - 10.80 10*3/mm3 Final     RBC   Date Value Ref Range Status   06/13/2019 4.56 3.77 - 5.28 10*6/mm3 Final   02/28/2019 4.34 3.77 - 5.28 10*6/mm3 Final     Hemoglobin   Date Value Ref Range Status   06/13/2019 13.3 12.0 - 15.9 g/dL Final     Hematocrit   Date Value Ref Range Status   06/13/2019 39.7 34.0 - 46.6 % Final     Platelets   Date Value Ref Range Status   06/13/2019 180 140 - 450 10*3/mm3 Final                BILATERAL BREAST MRI WITHOUT AND WITH CONTRAST  IMPRESSION:  1. There is considerable abnormal enhancement occupying the entirety of  the upper inner and upper outer quadrants of the right breast spanning a  region measuring approximately 10.5 cm in greatest dimension. There may  be involvement of the right nipple as well. This is most consistent with  extensive in situ disease in the upper inner and upper outer quadrants  of the right breast. No evidence for right axillary adenopathy is  appreciated.  2. There are no findings suspicious for malignancy in the left breast.     BI-RADS Category 6: Known biopsy-proven malignancy.     This report was finalized on 4/15/2019     Tissue Pathology Exam: [id number]   Order: [id number]   Collected:  5/15/2019 13:39 Status:  Final result    Visible to patient:  Yes (MyChart) Dx:  Ductal carcinoma in situ (DCIS) of ri...   Component    Final Diagnosis   1. Breast, Right Lower Flap True Anterior Margin, Excision:               A. Benign breast parenchyma with clustered apocrine cysts, final margin free of neoplasm by an additional 5 mm.     2. Lymph Node, Right [place] #1, Excision:               A. Benign lymph node (0/1).                 3. Lymph Node, Right [place] #2, Excision:               A. Benign lymph node (0/1).     4. [place] Lymph Node, Right #3, Excision:               A. Benign lymph node (0/1).     5. Lymph Node, Right [place] #4, Excision:               A. Benign lymph node (0/1).     6. Breast, Right, Mastectomy (563 grams):               A. Ductal carcinoma in situ (DCIS), micropapillary and papillary subtypes with low to intermediate nuclear grade,                    no necrosis identified.  All margins are free of in situ carcinoma.               B. 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               C. Non-neoplastic breast parenchyma shows focal areas of adenosis and clustered cysts with apocrine                    metaplasia and microcalcifications identified in association with benign ductal structures.               D. See synoptic template below for complete tumor details.     Mec/jse/pkm   Electronically signed by Lary De La Cruz MD for Sydni Bueno MD on 5/20/2019                      Assessment/Plan    1.  Low-grade DCIS extensive involvement of the right breast ER positive NH negative post mastectomy with immediate reconstruction  2.  Postoperative pain due to expander  3.  Urinary difficulty due to postmenopausal estrogen deprivation using vaginal estrogens till diagnosis  4.  Negative family history of breast or ovarian cancer    Plan  1.  Tamoxifen 20 mg daily for chemoprevention-  I explained to Gayatri that at this point-with the multiple previous biopsies in the  left breast and the extensive DCIS in the right breast she would be at a significant risk of DCIS or invasive cancer in the left breast.  I suspect she would do better with tamoxifen then with an aromatase inhibitor because of her vaginal and urinary issues for which she was taking vaginal estrogens.  I suggested she try the 20 mg dose and if she has trouble tolerating this because of urinary complaints we could either add back a low-dose vaginal estrogens which probably would be blocked by the tamoxifen or consider decreasing her tamoxifen dose to 10 mg which may also be as efficacious for prevention    Obviously if she she has poor tolerance no treatment would be given I also recommended that she would get MRIs for the remaining breast because of the inability ofStandard imaging to detect her disease    At this point she is planning to start her tamoxifen in about a month and then we will see her back after her permanent implants are placed in September to assess her tolerance  She will call if her urinary symptoms worsen on the tamoxifen to discuss how best to deal with this

## 2019-06-17 NOTE — PROGRESS NOTES
On 6/13/19, LCSW met with the patient prior to her consultation with Dr. Lund about treatment for her breast cancer. She had a mastectomy with reconstruction about 1 month ago. She had negative lymph nodes. She said this has all impacted her more than she expected. She has dense breasts and fibrocystic disease, and has often had to have repeat mammograms. She has been having a lot of pain. Pt had mastitis in February and was hospitalized. She said a biopsy was done, and her breast was different after.     Pt has been  for 8 years and lives alone. She has 3 children who live in the area, and are very involved with her. She has 5 grandchildren. 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     Pt was alert and oriented x 3. She scored a 4 on the Distress Questionnaire, indicating treatment decision, fears, and appearance as her areas of distress. LCSW reviewed supportive counseling and [facility] services with her, and provided her with literature about [facility].  Assistance was offered in the future if needed.

## 2019-07-03 ENCOUNTER — TRANSCRIBE ORDERS (OUTPATIENT)
Dept: PHYSICAL THERAPY | Facility: HOSPITAL | Age: 69
End: 2019-07-03

## 2019-07-03 DIAGNOSIS — Z85.3 HISTORY OF RIGHT BREAST CANCER: Primary | ICD-10-CM

## 2019-07-09 ENCOUNTER — HOSPITAL ENCOUNTER (OUTPATIENT)
Dept: PHYSICAL THERAPY | Facility: HOSPITAL | Age: 69
Setting detail: THERAPIES SERIES
Discharge: HOME | End: 2019-07-09

## 2019-07-09 DIAGNOSIS — D05.11 DUCTAL CARCINOMA IN SITU (DCIS) OF RIGHT BREAST: Primary | ICD-10-CM

## 2019-07-09 DIAGNOSIS — R29.3 POOR POSTURE: ICD-10-CM

## 2019-07-09 DIAGNOSIS — M25.511 CHRONIC RIGHT SHOULDER PAIN: ICD-10-CM

## 2019-07-09 DIAGNOSIS — G89.29 CHRONIC RIGHT SHOULDER PAIN: ICD-10-CM

## 2019-07-09 DIAGNOSIS — M75.41 IMPINGEMENT SYNDROME OF RIGHT SHOULDER: ICD-10-CM

## 2019-07-09 DIAGNOSIS — Z42.1 AFTERCARE POSTMASTECTOMY FOR BREAST RECONSTRUCTION: ICD-10-CM

## 2019-07-09 DIAGNOSIS — M25.611 DECREASED RANGE OF MOTION OF RIGHT SHOULDER: ICD-10-CM

## 2019-07-09 DIAGNOSIS — Z90.11 S/P RIGHT MASTECTOMY: ICD-10-CM

## 2019-07-09 PROCEDURE — 97161 PT EVAL LOW COMPLEX 20 MIN: CPT | Performed by: PHYSICAL THERAPIST

## 2019-07-09 PROCEDURE — 97110 THERAPEUTIC EXERCISES: CPT | Performed by: PHYSICAL THERAPIST

## 2019-07-09 PROCEDURE — 97140 MANUAL THERAPY 1/> REGIONS: CPT | Performed by: PHYSICAL THERAPIST

## 2019-07-09 NOTE — THERAPY EVALUATION
Outpatient Physical Therapy Ortho Initial Evaluation  [facility]     Patient Name: Gayatri Lee  : 1950  MRN: [id number]  Today's Date: 2019      Visit Date: 2019    Patient Active Problem List   Diagnosis   • Right foot pain   • Arthritis of big toe   • Acquired hallux rigidus of right foot   • Osteopenia   • History of colon polyps   • Recurrent sinusitis   • Menopausal symptoms   • Prediabetes   • Hyperlipidemia   • Cellulitis of right breast   • Mass of breast, right   • Ductal carcinoma in situ (DCIS) of right breast        Past Medical History:   Diagnosis Date   • Arthritis    • Basal cell carcinoma     followed by Dermatology   • Chronic gastritis    • DCIS (ductal carcinoma in situ)     followed by Dr. Denny   • Dry eye    • Fibrocystic breast    • History of colon polyps     followed by GI, Dr. Cuba   • History of endometriosis    • HSV-2 seropositive    • Hypercholesterolemia    • Hyperlipidemia    • Melanoma (CMS/HCC)    • Osteopenia     followed by Gynecology, Dr. Tequila Boyle   • Pelvic prolapse    • Prediabetes    • Recurrent sinusitis    • Tinnitus         Past Surgical History:   Procedure Laterality Date   • ABDOMINOPLASTY     • ADENOIDECTOMY     • BREAST RECONSTRUCTION Right 5/15/2019    Procedure: RIGHT PLACEMENT OF TISSUE EXPANDER WITH ALLODERM;  Surgeon: ROCK Mathur MD;  Location: [facility] OR;  Service: Plastics   • BREAST SURGERY Left     LUMPECTOMY   • BREAST SURGERY Left     LUMPECTOMY   • BROW LIFT AND BLEPHAROPLASTY     • COLONOSCOPY  2013    TUBULAR ADENOMAS, TORT COLON,  DR. CUBA   • COLONOSCOPY W/ POLYPECTOMY     • CYSTOCELE REPAIR  2017   • DILATATION AND CURETTAGE     • ENDOSCOPY     • EYE SURGERY     • HAND SURGERY     • HYSTERECTOMY     • LAPAROTOMY OOPHERECTOMY     • LARYNGOSCOPY      IRRITATION   DR. CORREA   • MASTECTOMY W/ SENTINEL NODE BIOPSY Right 5/15/2019    Procedure: RIGHT BREAST  MASTECTOMY WITH SENTINEL NODE BIOPSY;  Surgeon: Daniel Denny MD;  Location: [facility] OR;  Service: General   • OOPHORECTOMY     • TONSILLECTOMY  1955   • TUBAL ABDOMINAL LIGATION  1980   • WISDOM TOOTH EXTRACTION  1970       Visit Dx:     ICD-10-CM ICD-9-CM   1. Ductal carcinoma in situ (DCIS) of right breast D05.11 233.0   2. S/P right mastectomy Z90.11 V45.71   3. Aftercare postmastectomy for breast reconstruction Z42.1 V51.0   4. Decreased range of motion of right shoulder M25.611 719.51   5. Chronic right shoulder pain M25.511 719.41    G89.29 338.29   6. Poor posture R29.3 781.92   7. Impingement syndrome of right shoulder M75.41 726.2         Patient History     Row Name 07/09/19 1030             History    Chief Complaint  Pain  -SC      Type of Pain  Shoulder pain  -SC      Date Current Problem(s) Began  05/15/19  -SC      Brief Description of Current Complaint  Patient diagnosed right breast cancer, s/p right mastectomy with [place] node biopsy 0/4 R ALN (+) with immediate subpectoral tissue expander placement 05/15/2019 by surgeons Dr. Denny/Kevan. Currently expanding. Is retired. Is  by children and grand children in town. Does not require chemoradiation. Is to start Tamoxifen. Does exercise regularly including dance and yoga. Reports right shoulder pain since surgery and pain with expansion/reconstruction. Is going on a 3 week vacation to [place]. Goal for reconstruction in Sept 2019.   -SC      Patient/Caregiver Goals  Relieve pain;Return to prior level of function;Improve mobility;Improve strength;Know what to do to help the symptoms  -SC      Current Tobacco Use  none  -SC      Patient's Rating of General Health  Good  -SC      Hand Dominance  right-handed  -SC      History of Previous Related Injuries  history left breast biopsy  -SC      Are you or can you be pregnant  No  -SC         Pain     Pain Location  Shoulder  -SC      Pain at Present  2  -SC      Pain at Best  0  -SC       Pain at Worst  4  -SC      Pain Frequency  Intermittent  -SC      Pain Description  Aching  -SC      What Performance Factors Make the Current Problem(s) WORSE?  increased use of right UE, expanding, reaching, lifting, pushing, pulling, dancing  -SC      What Performance Factors Make the Current Problem(s) BETTER?  rest  -SC         Fall Risk Assessment    Any falls in the past year:  No  -SC      Previous Functional Level  -- independent  -SC         Services    Are you currently receiving Home Health services  No  -SC         Daily Activities    Primary Language  English  -SC      Are you able to read  Yes  -SC      Are you able to write  Yes  -SC      How does patient learn best?  Reading  -SC      Teaching needs identified  Home Exercise Program;Management of Condition  -SC      Patient is concerned about/has problems with  Flexibility;Grasping objects lifting;Performing home management (household chores, shopping, care of dependents);Performing job responsibilities/community activities (work, school,;Performing sports, recreation, and play activities;Reaching over head;Repetitive movements of the hand, arm, shoulder  -SC      Does patient have problems with the following?  None  -SC      Barriers to learning  None  -SC      Explanation of Functional Status Problem  independent  -SC      Pt Participated in POC and Goals  Yes  -SC         Safety    Are you being hurt, hit, or frightened by anyone at home or in your life?  No  -SC      Are you being neglected by a caregiver  No  -SC        User Key  (r) = Recorded By, (t) = Taken By, (c) = Cosigned By    Initials Name Provider Type    Rosemary Fry PT Physical Therapist          PT Ortho     Row Name 07/09/19 1030       Subjective Comments    Subjective Comments  initial evaluation  -SC       Precautions and Contraindications    Precautions/Limitations  other (see comments)  -SC    Precautions  R SLNB 0/4 NO BP/IV  -SC    Contraindications  no modalities   -SC       Subjective Pain    Able to rate subjective pain?  yes  -SC    Pre-Treatment Pain Level  2  -SC       Posture/Observations    Alignment Options  Forward head;Cervical lordosis;Thoracic kyphosis;Rounded shoulders;Scapular elevation;Scapular winging  -SC    Forward Head  Mild;Increased;Sitting posture  -SC    Cervical Lordosis  Normal  -SC    Thoracic Kyphosis  Mild;Increased;Sitting posture  -SC    Rounded Shoulders  Mild;Moderate;Increased;Sitting posture  -SC    Scapular Elevation  Right:;Mild;Increased;Sitting posture  -SC    Scapular winging  Normal  -SC    Observations  Incision healing  -SC       Special Tests/Palpation    Special Tests/Palpation  Shoulder  -SC       Shoulder Girdle Accessory Motions    Shoulder Girdle Accessory Motions Tested?  Yes  -SC    Posterior glide of humerus  Right:;Hypomobile;Right pain  -SC    Inferior glide of humerus  Right:;Hypomobile;Right pain  -SC    Lateral distraction  Right:;Hypomobile;Right pain  -SC    Long axis distraction  Right:;Hypomobile;Right pain  -SC       Shoulder Impingement/Rotator Cuff Special Tests    Flower-Hudson Test (RC Lesion vs. Bursitis)  Right:;Positive  -SC    Neer Impingement Test (RC Lesion vs. Bursitis)  Right:;Positive  -SC    Full Can Test (RC Lesion)  Right:;Negative  -SC    Empty Can Test (RC Lesion)  Right:;Negative  -SC    Drop Arm Test (Full Thickness RC Lesion)  Right:;Negative  -SC    Internal Impingement Sign  Right:;Positive  -SC    Lift-Off Test (Subscapularis Lesion)  Right:;Positive  -SC       Shoulder Girdle Palpation    Shoulder Girdle Palpation?  Yes  -SC    Subacromial Space  Right:;Tender;Guarded/taut  -SC    Supraspinatus Insertion  Right:;Tender;Guarded/taut  -SC    Long Head of Biceps  Right:;Tender;Guarded/taut;Swollen  -SC    Pect Minor  Right:;Tender;Guarded/taut  -SC       General ROM    GENERAL ROM COMMENTS  B UEs WFLs positive painful arc and impingement right shoulder  -SC       MMT (Manual Muscle Testing)     General MMT Comments  right shoulder grossly 4/5 R scapula 2+/5  -SC       Pathomechanics    Upper Extremity Pathomechanics  Excessive scapular elevation;Limited scapular upward rotation;Limited thoracic extension;Limited glenohumeral internal rotation  -SC       Gait/Stairs Assessment/Training    Bilateral Gait Deviations  forward flexed posture  -SC      User Key  (r) = Recorded By, (t) = Taken By, (c) = Cosigned By    Initials Name Provider Type    Rosemary Fry, PT Physical Therapist              Lymphedema     Row Name 07/09/19 1030             Lymphedema Assessment    Lymphedema Classification  RUE:;at risk/stage 0;secondary  -SC      Lymphedema Cancer Related Sx  right;modified radical mastectomy;reconstructive;sentinel node biopsy  -SC      Lymphedema Surgery Comments  May 2019  -SC      Lymph Nodes Removed #  4  -SC      Positive Lymph Nodes #  0  -SC      Chemo Received  no  -SC      Radiation Therapy Received  no  -SC      Infections or Cellulitis?  no  -SC        User Key  (r) = Recorded By, (t) = Taken By, (c) = Cosigned By    Initials Name Provider Type    SC Rosemary Brewster, PT Physical Therapist              Therapy Education  Education Details: anatomy of shoulder, reasons for symptoms, treatment options, HEP and provided green theraband for HEP, established POC and  goals, in depth education of reconstruction expanders vs implants  Given: HEP, Symptoms/condition management, Pain management, Posture/body mechanics, Mobility training, Other (comment)  Program: New  How Provided: Verbal, Demonstration, Written  Provided to: Patient  Level of Understanding: Teach back education performed, Verbalized, Demonstrated     PT OP Goals     Row Name 07/09/19 1030          PT Short Term Goals    STG Date to Achieve  08/20/19  -SC     STG 1  Patient independent and compliant with initial HEP focused on scapular stabilization for improved mobility and decreased pain  -SC     STG 2  Patient with  negative Flower-Hudson testing right shoulder for decreased impingement  -SC     STG 3  Patient able to return to Ballroom Dance with postural awareness without increase in symptoms and independent pain management techniques such as ice/exercise  -SC        Long Term Goals    LTG Date to Achieve  10/01/19  -SC     LTG 1  Patient independent and compliant with advanced HEP for transition to self-care  -SC     LTG 2  Patient with negative impingement right shoulder for improved mobility and optimal rehab with reconstruction  -SC     LTG 3  Patient score </=10/100 on DASH for improved function in home, community and exercise.   -SC        Time Calculation    PT Goal Re-Cert Due Date  10/01/19  -SC       User Key  (r) = Recorded By, (t) = Taken By, (c) = Cosigned By    Initials Name Provider Type    SC Rosemary Brewster, PT Physical Therapist          PT Assessment/Plan     Row Name 07/09/19 1030          PT Assessment    Functional Limitations  Limitation in home management;Limitations in community activities;Performance in leisure activities;Performance in sport activities  -SC     Impairments  Impaired flexibility;Impaired lymphatic circulation;Joint mobility;Muscle strength;Pain;Poor body mechanics;Posture;Range of motion  -SC     Assessment Comments  Ms. Lee is a 69 year old female, recently diagnosed with R BCA, s/p right mastectomy with immediate subpectoral tissue expander with 0/4 R ALN (+) performed by surgeons Dr. Denny and Kevan on 05/15/2019, currently expanding with goal of reconstruction in Sept 2019. Does not require chemoradiation. Is to initiate Tamoxifen with oncologist Dr. Lund. Patient is very active and enjoys dancing, yoga, biking and hiking. Presents to therapy with right shoulder and right chest wall pain post operatively, s/s consistent with expander pain and right shoulder impingement with RTC tendonitis. We will focus on exercise and manual techniques to improve symptoms and prehab  for optimal recovery after exchange surgery. She will also be provided with lymphedema educational information due to increased risk of post mastectomy lymphedema syndrome, lymph node removal and age.   -SC     Please refer to paper survey for additional self-reported information  Yes  -SC     Rehab Potential  Good  -SC     Patient/caregiver participated in establishment of treatment plan and goals  Yes  -SC     Patient would benefit from skilled therapy intervention  Yes  -SC        PT Plan    PT Frequency  1x/week;2x/week  -SC     Predicted Duration of Therapy Intervention (Therapy Eval)  4-6 weeks (however going on 3 weeks vacation, to start formal therapy in August 2019)  -SC     Planned CPT's?  PT EVAL LOW COMPLEXITY: 20671;PT RE-EVAL: 33497;PT THER PROC EA 15 MIN: 13994;PT THER ACT EA 15 MIN: 77219;PT MANUAL THERAPY EA 15 MIN: 45400;PT NEUROMUSC RE-EDUCATION EA 15 MIN: 15961;PT GAIT TRAINING EA 15 MIN: 34380;PT EVAL AQUA: 46936;PT AQUATIC THERAPY EA 15 MIN: 19131;PT SELF CARE/HOME MGMT/TRAIN EA 15: 87232;PT HOT OR COLD PACK TREAT MCARE;PT BIS XTRACELL FLUID ANALYSIS: 14766  -SC     PT Plan Comments  bioimpedance if indicated, assess shoulder, education lymphedema prevention  -SC       User Key  (r) = Recorded By, (t) = Taken By, (c) = Cosigned By    Initials Name Provider Type    SC Rosemary Brewster, PT Physical Therapist            Exercises     Row Name 07/09/19 1030             Subjective Comments    Subjective Comments  initial evaluation  -SC         Subjective Pain    Able to rate subjective pain?  yes  -SC      Pre-Treatment Pain Level  2  -SC         Exercise 1    Exercise Name 1  lat pull down standing TB  -SC      Sets 1  2  -SC      Reps 1  10  -SC      Additional Comments  B  -SC         Exercise 2    Exercise Name 2  shoulder row standing  -SC      Sets 2  2  -SC      Reps 2  10  -SC      Additional Comments  B  -SC         Exercise 3    Exercise Name 3  shoulder ext B standing  -SC       Sets 3  2  -SC      Reps 3  10  -SC      Additional Comments  GTB  -SC         Exercise 4    Exercise Name 4  shoulder ER TB B standing  -SC      Sets 4  2  -SC      Reps 4  10  -SC      Additional Comments  GTB  -SC         Exercise 5    Exercise Name 5  scapular retraction elbows straight/elbows bent  -SC      Sets 5  2  -SC      Reps 5  10  -SC      Additional Comments  5 seconds  -SC         Exercise 6    Exercise Name 6  reverse shld rolls  -SC      Sets 6  2  -SC      Reps 6  10  -SC         Exercise 7    Exercise Name 7  shoulder add elias  -SC      Reps 7  10  -SC      Time 7  5 seconds  -SC        User Key  (r) = Recorded By, (t) = Taken By, (c) = Cosigned By    Initials Name Provider Type    SC Rosemary Brewster, PT Physical Therapist           Manual Rx (last 36 hours)      Manual Treatments     Row Name 07/09/19 1030             Manual Rx 1    Manual Rx 1 Location  right shoulder, patient supine with HOB elevated 1 pillow and knees bent, HL  -SC      Manual Rx 1 Type  AP and inferior joint mobs right shoulder, STM/CFM along RTC tendon insertion, inferior oscillations, PROM all planes  -SC      Manual Rx 1 Grade  II-III  -SC        User Key  (r) = Recorded By, (t) = Taken By, (c) = Cosigned By    Initials Name Provider Type    SC Rosemary Brewster, PT Physical Therapist                      Outcome Measure Options: Disabilities of the Arm, Shoulder, and Hand (DASH)  DASH  Open a tight or new jar.: Moderate Difficulty  Write: No Difficulty  Turn a key: No Difficulty  Prepare a meal: No Difficulty  Push open a heavy door: Mild Difficulty  Place an object on a shelf above your head: Mild Difficulty  Do heavy household chores (e.g., wash walls, wash floors): No Difficulty  Garden or do yard work: No Difficulty  Make a bed: Mild Difficulty  Carry a shopping bag or briefcase: Mild Difficulty  Carry a heavy object (over 10 lbs): Mild Difficulty  Change a lightbulb overhead: No Difficulty  Wash or blow dry  your hair: No Difficulty  Wash your back: No Difficulty  Put on a pullover sweater: No Difficulty  Use a knife to cut food: No Difficulty  Recreational activities in which require little effort (e.g., cardplaying, knitting, etc.): No Difficulty  Recreational activities in which you take some force or impact through your arm, should or hand (e.g. golf, hammering, tennis, etc.): Moderate Difficulty  Recreational Activities in which you move your arm freely (e.g., frisbee, badminton, etc.): Moderate Difficulty  Manage transportation needs (getting from one place to another): No Difficulty  Sexual Activities: Mild Difficulty  During the past week, to what extent has your arm, shoulder, or hand problem interfered with your normal social activites with family, friends, neighbors or groups?: Slightly  During the past week, were you limited in your work or other regular daily activities as a result of your arm, shoulder or hand problem?: Slightly Limited  Arm, Shoulder, or hand pain: Mild  Arm, shoulder or hand pain when you performed any specific activity: Moderate  Tingling (pins and needles) in your arm, shoulder, or hand: None  Weakness in your arm, shoulder or hand: Moderate  Stiffness in your arm, shoulder or hand: Moderate  During the past week, how much difficulty have you had sleeping because of the pain in your arm, shoulder or hand?: Mild Difficulty  I feel less capable, less confident or less useful because of my arm, shoulder or hand problem: Agree  DASH Sum : 55  Number of Questions Answered: 30  DASH Score: 20.83         Time Calculation:     Start Time: 1030  Stop Time: 1115  Time Calculation (min): 45 min     Therapy Charges for Today     Code Description Service Date Service Provider Modifiers Qty    29603308818 HC PT THER PROC EA 15 MIN 7/9/2019 Rosemary Brewster, PT GP 1    82623184827 HC PT MANUAL THERAPY EA 15 MIN 7/9/2019 Rosemary Brewster, PT GP 1    87649248759 HC PT EVAL LOW COMPLEXITY 1  7/9/2019 Rosemary Brewster, PT GP 1          PT G-Codes  Outcome Measure Options: Disabilities of the Arm, Shoulder, and Hand (DASH)         Rosemary Guzman, PT  7/9/2019

## 2019-08-12 ENCOUNTER — HOSPITAL ENCOUNTER (OUTPATIENT)
Dept: PHYSICAL THERAPY | Facility: HOSPITAL | Age: 69
Setting detail: THERAPIES SERIES
Discharge: HOME | End: 2019-08-12

## 2019-08-12 DIAGNOSIS — Z42.1 AFTERCARE POSTMASTECTOMY FOR BREAST RECONSTRUCTION: ICD-10-CM

## 2019-08-12 DIAGNOSIS — M75.41 IMPINGEMENT SYNDROME OF RIGHT SHOULDER: ICD-10-CM

## 2019-08-12 DIAGNOSIS — M25.611 DECREASED RANGE OF MOTION OF RIGHT SHOULDER: ICD-10-CM

## 2019-08-12 DIAGNOSIS — D05.11 DUCTAL CARCINOMA IN SITU (DCIS) OF RIGHT BREAST: Primary | ICD-10-CM

## 2019-08-12 DIAGNOSIS — G89.29 CHRONIC RIGHT SHOULDER PAIN: ICD-10-CM

## 2019-08-12 DIAGNOSIS — R29.3 POOR POSTURE: ICD-10-CM

## 2019-08-12 DIAGNOSIS — Z90.11 S/P RIGHT MASTECTOMY: ICD-10-CM

## 2019-08-12 DIAGNOSIS — M25.511 CHRONIC RIGHT SHOULDER PAIN: ICD-10-CM

## 2019-08-12 PROCEDURE — 97110 THERAPEUTIC EXERCISES: CPT | Performed by: PHYSICAL THERAPIST

## 2019-08-12 PROCEDURE — 97140 MANUAL THERAPY 1/> REGIONS: CPT | Performed by: PHYSICAL THERAPIST

## 2019-08-12 NOTE — THERAPY TREATMENT NOTE
Outpatient Physical Therapy Ortho Treatment Note  [facility]     Patient Name: Gayatri Lee  : 1950  MRN: [id number]  Today's Date: 2019      Visit Date: 2019    Visit Dx:    ICD-10-CM ICD-9-CM   1. Ductal carcinoma in situ (DCIS) of right breast D05.11 233.0   2. S/P right mastectomy Z90.11 V45.71   3. Aftercare postmastectomy for breast reconstruction Z42.1 V51.0   4. Decreased range of motion of right shoulder M25.611 719.51   5. Chronic right shoulder pain M25.511 719.41    G89.29 338.29   6. Poor posture R29.3 781.92   7. Impingement syndrome of right shoulder M75.41 726.2       Patient Active Problem List   Diagnosis   • Right foot pain   • Arthritis of big toe   • Acquired hallux rigidus of right foot   • Osteopenia   • History of colon polyps   • Recurrent sinusitis   • Menopausal symptoms   • Prediabetes   • Hyperlipidemia   • Cellulitis of right breast   • Mass of breast, right   • Ductal carcinoma in situ (DCIS) of right breast        Past Medical History:   Diagnosis Date   • Arthritis    • Basal cell carcinoma     followed by Dermatology   • Chronic gastritis    • DCIS (ductal carcinoma in situ)     followed by Dr. Denny   • Dry eye    • Fibrocystic breast    • History of colon polyps     followed by GI, Dr. Cuba   • History of endometriosis    • HSV-2 seropositive    • Hypercholesterolemia    • Hyperlipidemia    • Melanoma (CMS/HCC)    • Osteopenia     followed by Gynecology, Dr. Tequila Boyle   • Pelvic prolapse    • Prediabetes    • Recurrent sinusitis    • Tinnitus         Past Surgical History:   Procedure Laterality Date   • ABDOMINOPLASTY     • ADENOIDECTOMY     • BREAST RECONSTRUCTION Right 5/15/2019    Procedure: RIGHT PLACEMENT OF TISSUE EXPANDER WITH ALLODERM;  Surgeon: ROCK Mathur MD;  Location: [facility] OR;  Service: Plastics   • BREAST SURGERY Left     LUMPECTOMY   • BREAST SURGERY Left     LUMPECTOMY   • BROW LIFT AND  BLEPHAROPLASTY  2004   • COLONOSCOPY  06/03/2013    TUBULAR ADENOMAS, TORT COLON,  DR. GOMEZ   • COLONOSCOPY W/ POLYPECTOMY     • CYSTOCELE REPAIR  05/2017   • DILATATION AND CURETTAGE     • ENDOSCOPY     • EYE SURGERY     • HAND SURGERY  1990   • HYSTERECTOMY  2017   • LAPAROTOMY OOPHERECTOMY  1994   • LARYNGOSCOPY      IRRITATION   DR. CORREA   • MASTECTOMY W/ SENTINEL NODE BIOPSY Right 5/15/2019    Procedure: RIGHT BREAST MASTECTOMY WITH SENTINEL NODE BIOPSY;  Surgeon: Daniel Denny MD;  Location: [facility];  Service: General   • OOPHORECTOMY     • TONSILLECTOMY  1955   • TUBAL ABDOMINAL LIGATION  1980   • WISDOM TOOTH EXTRACTION  1970                       PT Assessment/Plan     Row Name 08/12/19 1411          PT Assessment    Assessment Comments  noted improved range of motion, mild persisting symptoms RTC impingement with abduction and functional IR. To return after reconstruction 09/23/2019 unless otherwise indicated.   -SC        PT Plan    PT Plan Comments  post op reassessment however instructed to contact me during this time with any questions, concerns or changes in symptoms.   -SC       User Key  (r) = Recorded By, (t) = Taken By, (c) = Cosigned By    Initials Name Provider Type    Rosemary Fry, PT Physical Therapist            Exercises     Row Name 08/12/19 1411             Subjective Comments    Subjective Comments  My vacation was wonderful but I cannot say I was great about doing the band exercises. I was filled today and danced last night so I am sore today. Overall I feel like I am doing better. My surgery is set for 09/23/2019. I have one more filling. I will start back to the [facility] this week as well.   -SC         Subjective Pain    Able to rate subjective pain?  yes  -SC      Pre-Treatment Pain Level  2  -SC         Exercise 1    Exercise Name 1  lat pull down standing TB  -SC      Sets 1  2  -SC      Reps 1  10  -SC      Additional Comments  RTB  -SC         Exercise  2    Exercise Name 2  shoulder row standing  -SC      Sets 2  2  -SC      Reps 2  10  -SC      Additional Comments  RTB  -SC         Exercise 3    Exercise Name 3  shoulder ext B standing  -SC      Sets 3  2  -SC      Reps 3  10  -SC      Additional Comments  RTB  -SC         Exercise 4    Exercise Name 4  shoulder ER TB B standing  -SC      Sets 4  2  -SC      Reps 4  10  -SC      Additional Comments  RTB  -SC         Exercise 5    Exercise Name 5  scapular retraction elbows straight/elbows bent  -SC      Sets 5  2  -SC      Reps 5  10  -SC      Additional Comments  review  -SC         Exercise 6    Exercise Name 6  reverse shld rolls  -SC      Sets 6  2  -SC      Reps 6  10  -SC      Additional Comments  review  -SC         Exercise 7    Exercise Name 7  shoulder add elias  -SC      Reps 7  10  -SC      Time 7  5 seconds  -SC      Additional Comments  review  -SC        User Key  (r) = Recorded By, (t) = Taken By, (c) = Cosigned By    Initials Name Provider Type    SC Rosemary Brewster, PT Physical Therapist                      Manual Rx (last 36 hours)      Manual Treatments     Row Name 08/12/19 1411             Manual Rx 1    Manual Rx 1 Location  right shoulder, patient supine with HOB elevated 1 pillow and knees bent, HL  -SC      Manual Rx 1 Type  AP and inferior joint mobs right shoulder, STM/CFM along RTC tendon insertion, inferior oscillations, PROM all planes  -SC      Manual Rx 1 Grade  II-III  -SC        User Key  (r) = Recorded By, (t) = Taken By, (c) = Cosigned By    Initials Name Provider Type    SC Rosemary Brewster, PT Physical Therapist          PT OP Goals     Row Name 08/12/19 1411          PT Short Term Goals    STG Date to Achieve  08/20/19  -SC     STG 1  Patient independent and compliant with initial HEP focused on scapular stabilization for improved mobility and decreased pain  -SC     STG 1 Progress  Progressing  -SC     STG 1 Progress Comments  reviewed today  -SC     STG 2  Patient  with negative [place] testing right shoulder for decreased impingement  -SC     STG 2 Progress  Progressing  -SC     STG 2 Progress Comments  improved but persisting mildly  -SC     STG 3  Patient able to return to Ballroom Dance with postural awareness without increase in symptoms and independent pain management techniques such as ice/exercise  -SC     STG 3 Progress  Progressing  -SC     STG 3 Progress Comments  has returned to dance with mild symptoms during  -SC        Long Term Goals    LTG Date to Achieve  10/01/19  -SC     LTG 1  Patient independent and compliant with advanced HEP for transition to self-care  -SC     LTG 1 Progress  Ongoing  -SC     LTG 2  Patient with negative impingement right shoulder for improved mobility and optimal rehab with reconstruction  -SC     LTG 2 Progress  Ongoing  -SC     LTG 3  Patient score </=10/100 on DASH for improved function in home, community and exercise.   -SC     LTG 3 Progress  Ongoing  -SC        Time Calculation    PT Goal Re-Cert Due Date  10/01/19  -SC       User Key  (r) = Recorded By, (t) = Taken By, (c) = Cosigned By    Initials Name Provider Type    Rosemary Fry, PT Physical Therapist          Therapy Education  Education Details: continuation of care, precautions with expanders, recovery after implant surgery, exercises in gym/community, cardiovascular exercise, theraband exercises with proper form.   Given: HEP, Symptoms/condition management, Pain management, Posture/body mechanics, Mobility training, Other (comment)  Program: Progressed, Modified  How Provided: Verbal, Demonstration, Written  Provided to: Patient  Level of Understanding: Teach back education performed, Verbalized, Demonstrated              Time Calculation:   Start Time: 1411  Stop Time: 1431  Time Calculation (min): 20 min  Therapy Charges for Today     Code Description Service Date Service Provider Modifiers Qty    98328090730  PT THER PROC EA 15 MIN 8/12/2019  Rosemary Brewster, PT GP 1    [id number] HC PT MANUAL THERAPY EA 15 MIN 8/12/2019 Rosemary Brewster, PT GP 1                    Rosemary Guzman, PT  8/12/2019

## 2019-08-15 ENCOUNTER — OFFICE VISIT (OUTPATIENT)
Dept: OTHER | Facility: HOSPITAL | Age: 69
End: 2019-08-15

## 2019-08-15 VITALS
TEMPERATURE: 97.7 F | BODY MASS INDEX: 21.12 KG/M2 | WEIGHT: 123.7 LBS | HEART RATE: 67 BPM | RESPIRATION RATE: 18 BRPM | DIASTOLIC BLOOD PRESSURE: 73 MMHG | OXYGEN SATURATION: 97 % | SYSTOLIC BLOOD PRESSURE: 112 MMHG

## 2019-08-15 DIAGNOSIS — D05.11 DUCTAL CARCINOMA IN SITU (DCIS) OF RIGHT BREAST: Primary | ICD-10-CM

## 2019-08-15 PROCEDURE — 99215 OFFICE O/P EST HI 40 MIN: CPT | Performed by: NURSE PRACTITIONER

## 2019-08-15 PROCEDURE — G0463 HOSPITAL OUTPT CLINIC VISIT: HCPCS | Performed by: NURSE PRACTITIONER

## 2019-08-15 NOTE — PROGRESS NOTES
[facility] MULTIDISCIPLINARY CLINIC  SURVIVORSHIP VISIT    Gayatri Lee is a pleasant 69 y.o.   female being followed by Eneida Lund MD  for right breast DCIS. Here today in our Cancer Survivorship Clinic, to review survivorship care plan.    TREATMENT HISTORY:        Ductal carcinoma in situ (DCIS) of right breast    4/4/2019 Initial Diagnosis     Ductal carcinoma in situ (DCIS) of right breast         4/4/2019 Biopsy     Right breast, 12 O'clock, core biopsies:  Ductal carcinoma in situ (DCIS), micropapillary type, low grade, measuring at least 1.1 cm in dimension with associated dense stromal fibrosis and periductal fibrosis.    Estrogen receptor: Positive at 22.5%  Progesterone receptor: Negative at 0.05%  Ki 67: 9.39%         5/15/2019 Surgery     Right breast mastectomy with right sentinel lymph node biopsy per Daniel Denny MD. Immediately followed by placement of right tissue expander with ROCK Mathur MD    1. Breast, Right Lower Flap True Anterior Margin, Excision:               A. Benign breast parenchyma with clustered apocrine cysts, final margin free of neoplasm by an additional 5 mm.     2. Lymph Node, Right [place] #1, Excision:               A. Benign lymph node (0/1).                 3. Lymph Node, Right [place] #2, Excision:               A. Benign lymph node (0/1).     4. [place] Lymph Node, Right #3, Excision:               A. Benign lymph node (0/1).     5. Lymph Node, Right [place] #4, Excision:               A. Benign lymph node (0/1).     6. Breast, Right, Mastectomy (563 grams):               A. Ductal carcinoma in situ (DCIS), micropapillary and papillary subtypes with low to intermediate nuclear grade,                    no necrosis identified.  All margins are free of in situ carcinoma.               B. 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               C. Non-neoplastic breast parenchyma shows focal areas of  "adenosis and clustered cysts with apocrine                    metaplasia and microcalcifications identified in association with benign ductal structures.               D. See synoptic template below for complete tumor details.    4 negative lymph nodes         6/13/2019 -  Hormonal Therapy     Tamoxifen 20 mg by mouth daily            Allergies as of 08/15/2019 - Reviewed 08/15/2019   Allergen Reaction Noted   • Sulfa antibiotics Rash 12/02/2016       MEDICATIONS:  I have reviewed the medication list with the patient and I updated it in the electronic medical record. Medication dosages and frequencies were confirmed to be accurate with the patient.      Review of Systems   Constitutional: Positive for activity change. Negative for appetite change and fatigue.   Respiratory: Negative for chest tightness and shortness of breath.    Cardiovascular: Negative for chest pain and leg swelling.   Gastrointestinal: Positive for constipation. Negative for blood in stool and diarrhea.   Genitourinary: Positive for hematuria (trace blood in urinalysis).   Musculoskeletal: Negative for arthralgias and myalgias.        Right breast \"tightness\" from expander   Neurological: Positive for numbness (right axilla). Negative for dizziness and light-headedness.   Psychiatric/Behavioral: Positive for decreased concentration and sleep disturbance. Negative for dysphoric mood. The patient is not nervous/anxious.        DISTRESS QUESTIONNAIRE: 2/10 EFFECT TO LEVEL OF FUNCTIONING: mild  Patient identified areas of distress: see flowsheet      /73   Pulse 67   Temp 97.7 °F (36.5 °C) (Oral)   Resp 18   Wt 56.1 kg (123 lb 11.2 oz)   SpO2 97% Comment: room air  BMI 21.12 kg/m²     Wt Readings from Last 3 Encounters:   08/15/19 56.1 kg (123 lb 11.2 oz)   06/13/19 55.2 kg (121 lb 12.8 oz)   05/15/19 53.9 kg (118 lb 13.3 oz)       Pain Score    08/15/19 1439   PainSc: 0-No pain         Physical Exam   Constitutional: She is oriented to " person, place, and time. She appears well-developed and well-nourished. She is cooperative.   HENT:   Head: Normocephalic and atraumatic.   Cardiovascular: Normal rate and regular rhythm.   Pulmonary/Chest: Effort normal. No respiratory distress.   Abdominal: Soft. Normal appearance.   Neurological: She is alert and oriented to person, place, and time.   Skin: Skin is warm, dry and intact.   Psychiatric: She has a normal mood and affect. Her speech is normal and behavior is normal. Judgment and thought content normal. Cognition and memory are normal.         Problem List Items Addressed This Visit        Active Problems    Ductal carcinoma in situ (DCIS) of right breast - Primary            SURVIVORSHIP DISCUSSION HELD TODAY:   Primary patient goal(s): late and long-term effects of dz/tx     Management of disease and treatment related effects: Gayatri is doing relatively well.  She does have some right chest discomfort from expander fill on Monday.  She reports good appetite.  She is sleeping about 6 hours a night with some difficulty initiating sleep and sometimes waking in the middle of the night.  She has been having some hot flashes.  She reports that after starting tamoxifen in July she developed some dizziness and this lasted for days.  She reported this to the [facility] office at that time they encouraged hydration and asked her to call back if  no changes.  Patient reports she did discontinue taking tamoxifen daily and that the dizziness symptoms resolved.  She had a vacation in [place] and was not inclined to restart the medication while traveling.  Now that she is back, she remains hesitant to reinitiate and is aware that if she restarted now she would need to discontinue again in about 3 weeks in preparation for placement of her permanent breast implant.  I did encourage her to consider another trial of the tamoxifen and that now may be an opportune time knowing that it would have to be held anyway prior to  surgery.     Psychosocial and spiritual: Gayatri rates her distress a 2 today on the distress thermometer.  She reports struggling with self image issues and this began 2 years ago at the time of her hysterectomy which made her feel less as a woman.  This feelings exacerbated with her mastectomy.  She understands that her breast asymmetry now is temporary and will improve but she is struggling with feeling comfortable with appearance.  she is in our relationship with a partner that she does not see as long-term.  She has concerns about dating in the future.  We spent some time discussing sexuality and intimacy and I did provide her with a copy of the American Cancer Society booklet regarding sexuality for the woman with cancer.  She does report good support from her current partner as well as her 2 adult children.  We did discuss support resources available in the community including offerings at [facility] and the friend for life cancer support network and she tells me she is about a block away from [facility]'s new location and is inclined to investigate their offerings.       Advance Care Planning   Advance Care Planning Discussion:    Patient does have advance care planning complete and it is scanned to the medical record. She has identified her sons and daughter as her health care surrogates. We did spend some time in discussion clarifying the differences between a living will and a DNR and that having a living will is not equivalent to a do not resuscitate order.    Brief discussion and written information provided regarding advance care planning and appropriateness for all healthy adults, choosing a healthcare surrogate and upcoming Advance Care Planning classes offered monthly at [facility].         Maintaining personal wellness: We discussed current BMI of 21 and recommended target of 20-25 for wellness, cardiovascular health and risk reduction for cancer recurrence and prevention. We  discussed availability of oncology registered dietician, Marlys Alvarez and referral offered. Patient declines at this time. Marlys's contact info and handout describing recommended dietary modifications emphasizing whole foods, plant based diet provided.         I think adding to her challenges with self image is that her physical activity has been significantly curtailed by her surgeries.  She enjoys cycling, yoga and dancing and has quite a few activity restrictions while she undergoes her reconstruction process and this is negatively impacting her as well.  She did at one point try walking but unfortunately developed plantar fasciitis and so has been focusing most effort on cycling.  We reviewed opportunities available at the [facility] and [employer] and I provided her with contact information for those programs.           Discussed NCCN recommendations for all cancer survivors of 150 minutes/week moderate intensity exercise, achieve and maintain a healthy BMI, plants-based whole-foods diet, avoid tobacco and second hand smoke, avoid alcohol or minimize alcohol intake - no more than 1 drink in a day for women, 2 drinks in a day for men.     No orders of the defined types were placed in this encounter.      After review of the Survivorship Treatment Summary & Care Plan, the patient verbalized understanding of recommendations for follow-up. As outlined in the care plan, they were advised to continue with follow-up care in accordance with the NCCN surveillance guidelines while transitioning back to their primary care physician for continued general preventive and healthcare needs. We discussed the importance of healthy eating, exercise and weight management. We reviewed current guidelines for routine screening of other cancers.     A copy of the Survivorship Treatment Summary & Care Plan for Ms. Lee (see below) was provided to and forwarded to the providers identified on the care team.      Greater than 40 minutes  spent with patient, more than half of that spent face-to-face counseling patient extensively on treatment summary, surveillance for recurrence, late and long-term effects of disease and treatment, intimacy and self-image, preventative screening for other cancers, achieving/maintaining healthy BMI and link to risk reduction, adherence to current therapies, management of anxiety/uncertainty and self care strategies.         Breast Cancer Survivorship Plan      General Information   Patient name Gayatri Lee   Date of birth 1950    Phone Home Phone [phone]   Mobile [phone]        Email [email or web address]      Cancer Treatment Team     Patient Care Team:  Jamison Castellanos MD as Consulting Physician (Dermatology)  Felix Cuba MD as Consulting Physician (Gastroenterology)  Daniel Denny MD as Surgeon (Breast Surgery)  Daniel Denny MD as Referring Physician (Breast Surgery)  Eneida Lund MD as Consulting Physician (Hematology and Oncology)    Provider Phone numbers  Care Team Provider: Jamison Castellanos MD,  ([phone])  Care Team Provider: Felix Cuba MD,  ([phone])  Care Team Provider: Tequila Boyle MD,  ([phone])  Care Team Provider: Daniel Denny MD,  ([phone])  Care Team Provider: Daniel Denny MD,  ([phone])  Care Team Provider: Eneida Lund MD,  ([phone])  Care Team Provider: Daniel Denny MD,  ([phone])     Post Treatment Care Team   Primary Care Physician Narcisa Villafana MD  [address] [place] [id number] [phone]         Background Information   Medical history Past Medical History:   • Arthritis   • Basal cell carcinoma    followed by Dermatology   • Chronic gastritis   • DCIS (ductal carcinoma in situ)    followed by Dr. Denny   • Dry eye   • Fibrocystic breast   • History of colon polyps    followed by GI, Dr. Cuba   • History of endometriosis   •  HSV-2 seropositive   • Hypercholesterolemia   • Hyperlipidemia   • Melanoma (CMS/HCC)   • Osteopenia    followed by Gynecology, Dr. Tequila Boyle   • Pelvic prolapse   • Prediabetes   • Recurrent sinusitis   • Tinnitus        Surgical history Past Surgical History:   • ABDOMINOPLASTY   • ADENOIDECTOMY   • BREAST RECONSTRUCTION    Procedure: RIGHT PLACEMENT OF TISSUE EXPANDER WITH ALLODERM;  Surgeon: ROCK Mathur MD;  Location: [facility];  Service: Plastics   • BREAST SURGERY    LUMPECTOMY   • BREAST SURGERY    LUMPECTOMY   • BROW LIFT AND BLEPHAROPLASTY   • COLONOSCOPY    TUBULAR ADENOMAS, TORT COLON,  DR. GOMEZ   • COLONOSCOPY W/ POLYPECTOMY   • CYSTOCELE REPAIR   • DILATATION AND CURETTAGE   • ENDOSCOPY   • EYE SURGERY   • HAND SURGERY   • HYSTERECTOMY   • LAPAROTOMY OOPHERECTOMY   • LARYNGOSCOPY    IRRITATION   DR. CORREA   • MASTECTOMY W/ SENTINEL NODE BIOPSY    Procedure: RIGHT BREAST MASTECTOMY WITH SENTINEL NODE BIOPSY;  Surgeon: Daniel Denny MD;  Location: [facility];  Service: General   • OOPHORECTOMY   • TONSILLECTOMY   • TUBAL ABDOMINAL LIGATION   • WISDOM TOOTH EXTRACTION        Tobacco use Social History     Tobacco Use   Smoking Status Never Smoker   Smokeless Tobacco Never Used        Family oncology history Cancer-related family history includes Cancer in her father and sister.      Oncology Information      Ductal carcinoma in situ (DCIS) of right breast    4/4/2019 Initial Diagnosis     Ductal carcinoma in situ (DCIS) of right breast           4/4/2019 Biopsy     Right breast, 12 O'clock, core biopsies:  Ductal carcinoma in situ (DCIS), micropapillary type, low grade, measuring at least 1.1 cm in dimension with associated dense stromal fibrosis and periductal fibrosis.    Estrogen receptor: Positive at 22.5%  Progesterone receptor: Negative at 0.05%  Ki 67: 9.39%           5/15/2019 Surgery     Right breast mastectomy with right sentinel lymph node biopsy per Daniel  Denisha BRADFORD. Immediately followed by placement of right tissue expander with ROCK Mathur MD    1. Breast, Right Lower Flap True Anterior Margin, Excision:               A. Benign breast parenchyma with clustered apocrine cysts, final margin free of neoplasm by an additional 5 mm.     2. Lymph Node, Right [place] #1, Excision:               A. Benign lymph node (0/1).                 3. Lymph Node, Right [place] #2, Excision:               A. Benign lymph node (0/1).     4. [place] Lymph Node, Right #3, Excision:               A. Benign lymph node (0/1).     5. Lymph Node, Right [place] #4, Excision:               A. Benign lymph node (0/1).     6. Breast, Right, Mastectomy (563 grams):               A. Ductal carcinoma in situ (DCIS), micropapillary and papillary subtypes with low to intermediate nuclear grade,                    no necrosis identified.  All margins are free of in situ carcinoma.               B. 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               C. Non-neoplastic breast parenchyma shows focal areas of adenosis and clustered cysts with apocrine                    metaplasia and microcalcifications identified in association with benign ductal structures.               D. See synoptic template below for complete tumor details.    4 negative lymph nodes           6/13/2019 -  Hormonal Therapy     Tamoxifen 20 mg by mouth daily                  Complications during Therapy:   No concerns stated   Modification to Treatment Plan:  No Modifications      Lifetime Dose Tracking:   No doses have been documented on this patient for the following tracked chemicals: Doxorubicin, Epirubicin, Idarubicin, Daunorubicin, Mitoxantrone, Bleomycin, Mitomycin, Doxorubicin Liposomal             [No treatment plan]         Persistent Treatment-Associated Adverse Effects at Completion of Therapy   It is important to recognize that not every person experiences the following  adverse events after treatment.  You may not have any of these issues, a few or many adverse effects.  Experiences are highly variable.  Please discuss any adverse effects of cancer treatment with your cancer care team.      After Surgical Therapy   Mastectomy with Reconstruction     Mastectomy involves the removal of the entire breast. Breast reconstruction surgery creates a breast shape using implants or your own body tissue. Breast reconstruction can be done at the same time as mastectomy (immediate reconstruction) or at a later time (delayed reconstruction). After surgery, there may be pain and soreness in the chest, underarm, shoulder and in any area from which tissue was taken for breast reconstruction. Those sensations should get better over time. Nerves may be damaged in areas where any surgery was performed which may result in numbness and other changes in sensation. Care and attention to wound healing after surgery is important, especially with tissue flap surgery reconstruction which may cause more delayed healing and increased risk of tissue death (necrosis). Healing can be affected by things like smoking, diabetes and other factors. Sometimes, more surgery and special wound care may be required. Talk with your doctor or nurse if you have issues with pain, signs of infection or non-healing wounds, numbness or tingling, or any changes in function or mobility.      How you think and feel about your body is important and coping with changes after mastectomy and reconstruction takes time.  A reconstructed breast will not look or feel the same as your natural breast and the result may look different than expected. It’s important to look at your scars and your reconstructed breast. It’s important to touch your scar and reconstructed breast, too.  Your physician may give you instructions about massaging the scar to help with healing and to soften scar tissue. If you have a partner, let your partner look at your  chest and feel the scar when you’re ready.  Working through feelings about the cancer and changes as a result of surgery may take time and support. Talk with your doctor or nurse about any issues with body image and coping.     Survivors of breast cancer should speak with their health care provider regarding the possibility of a genetic or family syndrome. If there does appear to be a family history or possible genetic syndrome, genetic counseling and testing may be recommended.      [place] Node Biopsy   Removal of the sentinel lymph node is the removal of the first lymph node to which cancer cells are most likely to spread. After surgery, there may be pain and soreness in the area where the node was removed.  Nerves may be damaged which may result in numbness or other changes in sensation. While sentinel node biopsy (as opposed to a lymph node dissection where more lymph nodes are removed) decreases the risk of developing lymphedema, the risk is not completely gone.     Lymphedema   Removal of lymph nodes can slow the normal flow of lymph in the area which can lead to swelling in that limb, also called lymphedema.  Survivors who also received radiation therapy to the area where a lymph node was removed may be at increased risk of developing lymphedema. In some cases, the lymphedema can occur years after cancer therapy was completed. Lymphedema can cause pain or discomfort, disfigurement, change in function, and increased risk of infection in the affected area and closest limb. Signs of lymphedema can include a feeling of fullness or heaviness, changes in the skin (red, thick, stiff), aching, tightness, and difficulty moving or flexing nearby joints.  Other signs could be that your jewelry or clothes, like socks, pants or sleeves, may begin to feel tight on the affected limb. As signs of lymphedema could develop months or years after treatment, continue to monitor for signs and notify your doctor.      Several  steps can be taken to help prevent and control lymphedema. Survivors should protect the potentially affected limb by avoiding cuts, scrapes, burns, insect bites, shots/vaccines, blood draws, and IV sticks in order to decrease the risk of developing an infection in the limb. In addition, the survivor should protect the limb from the sun to avoid sunburn.  Lastly, survivors should avoid tight clothing and jewelry, and blood pressures in the affected limb that might further slow the normal flow of lymph.      Survivors of cancer, including those at risk for lymphedema, can and should exercise. Survivors should start slow and gradually increase intensity while monitoring your limb for changes in swelling or redness. If either occurs, stop exercising and notify your doctor for further direction.            After Chemotherapy   No chemotherapy received.      After Radiation Therapy   No radiation treatments received.      Hormone Therapy    Hormones, like estrogen and progesterone, are naturally produced in the body. Typically these hormones help our body function in various ways, however, in some cases these hormones can also cause cancer to grow.  When your cancer is positive for hormone receptors, your doctor may recommend hormone therapy. Hormone therapy works by either blocking the effects of the hormone on the cancer cell, or by reducing the amount of hormone produced by the body.  In doing so, the cancer cells no longer receive or use the hormone to grow.      It is very important for you to take the medicine as prescribed by your doctor and keep your regular follow-up appointments. Some medications interfere with hormone therapy medications, so be sure and discuss all medications that you take with your doctor.  Common side effects women experience from hormone therapy includes hot flashes, vaginal dryness, and lack of a period in women who have not yet reached menopause.  Some less common but serious side effects  of hormone therapy may include increased risk for blood clots, joint pain, bone loss, weakness, mood changes, nausea, fatigue, and loss of interest in sexual activity, endometrial and uterine cancers, risk for stroke, heart attack, angina, and heart failure. Share with your doctor the side effects you experience so a plan can be made to minimize the effects. In addition, these medications will be taken long-term, in some cases 5-10 years. When taking oral hormone therapy, it may be helpful to create a calendar, use a pillbox, or set an alarm on your watch or phone to remind you daily to take the medication.      Care of your Venous Access Device   No Venous Access Device currently in place      General After Cancer Treatment        It is not uncommon for cancer to impact other areas of your life such as relationships, work and mental health.  If you develop financial concerns, resources are sometimes available to assist in these areas.  Depression and anxiety can present either during or after cancer diagnosis and treatment.  It is important to discuss with your physician any of these concerns so these resources can be made available to you.      General Cancer Support & Resources    [facility]    Cancer Resource Center & Multidisciplinary Clinic:  [address] [place] [address]  [phone]    Survivorship Clinical Nurse Specialist  [facility] - [phone]    Oncology Social Worker:  Rosemary Yun W - [phone]     Psychiatric Nurse Practitioner:  [facility] - [phone]    Financial Counselor and Contact Information:  [facility] - [phone]    Oncology Dietician Contact Information:  Marlys Alvarez  - [phone]     Common Concerns After Treatment    Managing Anxiety or Depression:  Referral to support group, e.g. American Cancer Society (www.cancer.org, 442.925.3540), Cancer Support Community  (www.[employer].org, 795.586.6301)    Referral to a community provider for cognitive behavioral therapy or counseling.    Psychiatric care or counseling and/or initiation of pharmacotherapy are available at the [facility] Psychosocial Supportive Oncology . Please call [phone] or talk to a member of your treatment team about a referral.    For anonymous information, resource requests or support you can also call the 24-hour Crisis and Information Center at [phone]      Fear of Cancer Coming Back:  Patients need to know that these feelings are normal, and they won’t cause the cancer to come back.    • Referral for cognitive behavioral therapy or counseling    • Referral to support group, e.g. American [employer] Society (www.cancer.org, 559.888.9299), Cancer Support Community (www.wellnessandcancer.org, 181.282.8216)      Fatigue:  Fatigue is one of the most common problems in patients with cancer. It may be related to the disease itself or cancer treatment and may continue beyond completion of treatment among long-term cancer survivors. Among people with cancer, 80% to 100% report fatigue.     Fatigue may be an isolated problem or occur as one part of a cluster of symptoms, such as pain, depression, dyspnea, anorexia, and sleep problems.    If you are taking an immunotherapy, fatigue may be a symptom of an endocrine disorder secondary to immunotherapy, such as thyroid or pituitary disorder, not just general cancer-related fatigue. It is important to report any changes to your doctor (ONS, 2017)    Regular physical activity (goal of 150 minutes of activity per week) is the most important thing you can do to manage fatigue. Other treatments that are likely to be helpful include:    · Progressive muscle relaxation and/or relaxation breathing with or without imagery or distraction  · Cognitive behavioral therapy for sleep  · Yoga  · Meditation, mindfulness based stress reduction, and cognitive  behavioral stress management  · Cognitive behavioral therapy for fatigue, depression, and pain with or without hypnosis  · Evaluation for other causes of fatigue including hypothyroidism, anemia and depression      Financial Concerns:  The financial challenges that people with cancer and their families face are very real.     For hospital bills, you may want to talk with a hospital financial counselor. You may be able to work out a monthly payment plan or even get a reduced rate. You may also want to stay in touch with your insurance company to make sure costs are covered.    For information about resources that are available you can go to the NCI database, “Organizations That Offer Support Services,” at http://www.cancer.gov, search terms “financial assistance.”     Or call the [facility] toll-free 9-662-6-CANCER (1-509.563.9120) to ask for help.      Managing Hot Flashes:  · Avoiding sugar may reduce night sweats  · New research studies suggest acupuncture may be helpful in control of hot flashes  · Regular meditation calms the adrenal glands and may reduce hot flashes and night sweats  · Ask your doctor about appropriate medical treatments. Medicines that may help manage hot flashes include Venlafaxine (Effexor) 37.5mg up to 75 mg daily OR Gabapentin (Neurontin) 300mg at bedtime up to 3 times/day.         Managing Insomnia  · Mindfulness based stress reduction  · Practice good sleep hygiene (reduce/eliminate TV and electronic device screen time one hour before bed)  · Avoid stimulants like caffeine and nicotine close to bedtime  · Avoid alcohol close to bedtime. While alcohol is well-known to help you fall asleep faster, too much close to bedtime can disrupt sleep in the second half of the night as the body begins to process the alcohol.     · Evaluation and management of other symptoms (hot flashes, anxiety, and pain)  · Regular physical activity (goal of 150 minutes of activity per week)  · The supplement melatonin  may help with sleep disruption    More information at www.sleepfoundation.org      Concerns about Sexuality, Intimacy and Body Image  · Ask your treatment team about referrals to counseling and/or support groups to address body image concerns.  · Non-hormonal remedies for vaginal dryness for sexual activity include water-based vaginal lubricants (Astroglide, [place] Jelly). Avoid products with spermicides or additives as they can be irritating.  · Some people use a non-hormonal vaginal moisturizer 1-3 times per week to improve general comfort (Replens, coconut oil).  · Discuss pain with intercourse or sexual activity with your doctor.    Look Good Feel Better is a non-medical, brand-neutral public service program that teaches beauty techniques to people with cancer to help them manage the appearance-related side effects of cancer treatment. The program includes lessons on skin and nail care, cosmetics, wigs and turbans, accessories and styling, helping people with cancer to find some normalcy in a life that is by no means normal.    For more information and helpful videos visit:  http://lookgoodfeelbetter.org/       About Lymphedema:  According to the National Cancer Barton, anywhere from 5-17% of women who have sentinel lymph node biopsy develops lymphedema. Among women who have axillary lymph node dissection, the percentage is higher - from 20-53% - and risk increases with the number of nodes taken out.     · Referral to lymphedema physical therapy specialist for lymphatic massage or proper fitting of compression garments or bandages  · Weight training and regular exercise  · Achieve and maintain a healthy weight    Tips to reduce the risk of injury or infection to the arm:  · Treat infections of the at-risk arm and hand right away.  · Wear gloves when doing house or garden work.  · Keep skin clean and well-moisturized.  · Use the arm not at risk when having blood drawn, getting injections or having blood pressure  "taken.  · Avoid sunburn and excess heat from saunas, hot baths, tanning and other sources.  · Don't cut nail cuticles.  · Use insect repellant when outdoors.  · Avoid injuries, including scratches and bruises, to the at-risk arm.  · Rest the at-risk arm in an elevated position (above the heart or shoulder).     If you have an infection, injury or any of the symptoms listed above, see your health care provider.        Changes in Memory or \"Brain Fog\"  Patients should know that 25% of cancer patients have cognitive dysfunction (changes in thinking or memory) after treatment and it usually gets better over time    Some things you can do to manage \"brain fog\" or memory problems include:  · Mental exercises (e.g., word games, crossword puzzles)   · Setting up and following routines  · Repeating information  · Keeping a memory journal  · Avoiding multitasking  · Exercising  · Maintaining a healthy diet.   · There is some evidence that Group Cognitive Training is also effective  · Rule out depression, sleep disturbance      Tamoxifen  Possible side effects of Tamoxifen include clotting events which could include strokes, heart attacks, pulmonary emboli, DVT, as well as uterine cancer, mood swings, hot flashes, depression.    Contact prescriber immediately if you experience pain, swelling, or tenderness in your legs or calves or if you have unexpected shortness of  breath, sudden chest pain, coughing up blood, new breast lumps, vaginal bleeding, menstrual irregularities, changes in vaginal discharge, pelvic pain or pressure, or vision changes.     Please do not stop taking Tamoxifen without first speaking to your medical oncologist.    Avoid drugs or foods that interfere with efficacy of Tamoxifen.     Potent inhibitors of CYP2D2 which may result in lower levels of Tamoxifen include:  · antidepressants that act as selective serotonin reuptake inhibitors (SSRIs) or selective noradrenergic reuptake inhibitors (SNRIs) -- " paroxetine, fluoxetine, bupropion, and duloxetine  · antipsychotics -- thioridazine, perphenazine, and pimozide  · cardiac drugs -- quinidine and ticlopidine  · medications for infectious disease -- terbinafine and quinidine  · miscellaneous medication -- cinacalcet.      Maintain a Healthy Weight:  Aim for a target BMI of 20-25 m2  (Body mass index is 20.79 kg/m².)    An oncology specialized registered dietician is available at the [facility] [place] for consultations to you. Please call [phone]      Prevention and Wellness:  · Achieve and maintain a healthy body weight as weight gain is a risk factor for development or recurrence of some cancers (BMI between 20-25m2).  · Current Body mass index is 20.79 kg/m².  · Regular physical activity (preferably daily). Strive for at least 150 minutes of moderate or 75 minutes of vigorous activity per week.  · Maintain a diet high in vegetables, fruits and whole grains and low in sugars and fats. Limit red meat and avoid processed foods.  · Avoid all tobacco and second hand smoke.  · Avoid all alcohol intake.  · If you do drink, minimize alcohol exposure - no more than one drink in a day for women and two drinks in a day for men.  · Continue all standard non-cancer related healthcare with your primary care provider. Any new, unusual, and/or persistent symptoms should be brought to the attention of your provider.            Surveillance    How Frequent?    Medical Oncology visits 1 - 4 times per year as clinically appropriate for 5 years, then annually      Lab tests As directed    Imaging exams      Mammography Annual clinical breast exam needed    Schedule a mammogram one year after the first mammogram that led to diagnosis, but no earlier than six months after radiation therapy. Obtain a mammogram every six to 12 months thereafter.    Women who have had a mastectomy (including simple mastectomy, modified radical mastectomy, and radical mastectomy) to treat  breast cancer need no further routine screening mammograms on the affected side.     Per Dr Lund annual left breast MRI    Lymphedema Monitor for lymphedema    Genetic Counseling Periodic screening for changes in family history and referral to genetic counseling as indicated    Gynecologic assessment For women on tamoxifen, gynecologic assessment every 12 months if uterus is present.    Pap smear: Beginning at age 30, every three years if the last three PAP tests in a row were normal. Every year if the last PAP was abnormal. every 3 years as long as the last 3 were normal, every year if abnormal.     Women age 70 and older who have had 3 or more normal PAP tests in a row, and no abnormal PAP tests results in the last 10 years, may choose to stop having PAP tests.    Pelvic exam:  Continue to visit a gynecologist annually.      Bone Density study Bone densitometry (DEXA scan) every 1 to 2 years after initiation of aromatase inhibitor (if applicable) and/or at age 65 or older.    Calcium and Vitamin D.    Weight-bearing exercise most days of the week e.g. moderate intensity walking (can have a conversation but cannot sing).    Avoid all smoke and second hand smoke.     Bisphosphonate, if indicated by your doctor based on bone densitometry (DEXA scan) results.      Immunizations       Influenza       Herpes Zoster       Pneumococcal Yearly  Once  As appropriate    Tobacco Cessation The patient is not currently a tobacco user.  Counseling given: Not Answered         Monitor for ongoing toxicities:   None Specified      Call your doctor if you have any of these signs or symptoms   New or worsening symptoms.  Pain that is new, unrelieved or bothersome.  Difficulty with your emotions, anxiety, and/or depression.  Physical problems that affect your abilities in your daily life or those that are bothersome (for example, continued fatigue, trouble sleeping, sexual problems, or edema).      Referrals provided   There are no  referral needs at this time.

## 2019-09-16 ENCOUNTER — APPOINTMENT (OUTPATIENT)
Dept: PREADMISSION TESTING | Facility: HOSPITAL | Age: 69
End: 2019-09-16

## 2019-09-16 VITALS
TEMPERATURE: 98.2 F | WEIGHT: 119.25 LBS | SYSTOLIC BLOOD PRESSURE: 99 MMHG | OXYGEN SATURATION: 98 % | DIASTOLIC BLOOD PRESSURE: 65 MMHG | HEART RATE: 61 BPM | BODY MASS INDEX: 20.36 KG/M2 | RESPIRATION RATE: 18 BRPM | HEIGHT: 64 IN

## 2019-09-16 LAB
ANION GAP SERPL CALCULATED.3IONS-SCNC: 10.5 MMOL/L (ref 5–15)
BASOPHILS # BLD AUTO: 0.08 10*3/MM3 (ref 0–0.2)
BASOPHILS NFR BLD AUTO: 1.8 % (ref 0–1.5)
BUN BLD-MCNC: 17 MG/DL (ref 8–23)
BUN/CREAT SERPL: 22.7 (ref 7–25)
CALCIUM SPEC-SCNC: 9.5 MG/DL (ref 8.6–10.5)
CHLORIDE SERPL-SCNC: 102 MMOL/L (ref 98–107)
CO2 SERPL-SCNC: 26.5 MMOL/L (ref 22–29)
CREAT BLD-MCNC: 0.75 MG/DL (ref 0.57–1)
DEPRECATED RDW RBC AUTO: 44.5 FL (ref 37–54)
EOSINOPHIL # BLD AUTO: 0.14 10*3/MM3 (ref 0–0.4)
EOSINOPHIL NFR BLD AUTO: 3.1 % (ref 0.3–6.2)
ERYTHROCYTE [DISTWIDTH] IN BLOOD BY AUTOMATED COUNT: 13.3 % (ref 12.3–15.4)
GFR SERPL CREATININE-BSD FRML MDRD: 77 ML/MIN/1.73
GLUCOSE BLD-MCNC: 86 MG/DL (ref 65–99)
HCT VFR BLD AUTO: 39.7 % (ref 34–46.6)
HGB BLD-MCNC: 12.8 G/DL (ref 12–15.9)
IMM GRANULOCYTES # BLD AUTO: 0.01 10*3/MM3 (ref 0–0.05)
IMM GRANULOCYTES NFR BLD AUTO: 0.2 % (ref 0–0.5)
LYMPHOCYTES # BLD AUTO: 1.54 10*3/MM3 (ref 0.7–3.1)
LYMPHOCYTES NFR BLD AUTO: 33.8 % (ref 19.6–45.3)
MCH RBC QN AUTO: 29.2 PG (ref 26.6–33)
MCHC RBC AUTO-ENTMCNC: 32.2 G/DL (ref 31.5–35.7)
MCV RBC AUTO: 90.6 FL (ref 79–97)
MONOCYTES # BLD AUTO: 0.48 10*3/MM3 (ref 0.1–0.9)
MONOCYTES NFR BLD AUTO: 10.5 % (ref 5–12)
NEUTROPHILS # BLD AUTO: 2.31 10*3/MM3 (ref 1.7–7)
NEUTROPHILS NFR BLD AUTO: 50.6 % (ref 42.7–76)
NRBC BLD AUTO-RTO: 0 /100 WBC (ref 0–0.2)
PLATELET # BLD AUTO: 256 10*3/MM3 (ref 140–450)
PMV BLD AUTO: 10.6 FL (ref 6–12)
POTASSIUM BLD-SCNC: 4.4 MMOL/L (ref 3.5–5.2)
RBC # BLD AUTO: 4.38 10*6/MM3 (ref 3.77–5.28)
SODIUM BLD-SCNC: 139 MMOL/L (ref 136–145)
WBC NRBC COR # BLD: 4.56 10*3/MM3 (ref 3.4–10.8)

## 2019-09-16 PROCEDURE — 80048 BASIC METABOLIC PNL TOTAL CA: CPT | Performed by: SURGERY

## 2019-09-16 PROCEDURE — 36415 COLL VENOUS BLD VENIPUNCTURE: CPT

## 2019-09-16 PROCEDURE — 85025 COMPLETE CBC W/AUTO DIFF WBC: CPT | Performed by: SURGERY

## 2019-09-16 RX ORDER — TAMOXIFEN CITRATE 20 MG/1
20 TABLET ORAL DAILY
Status: ON HOLD | COMMUNITY
End: 2020-07-07

## 2019-09-16 RX ORDER — CHLORHEXIDINE GLUCONATE 500 MG/1
CLOTH TOPICAL
COMMUNITY
End: 2019-09-23 | Stop reason: HOSPADM

## 2019-09-16 NOTE — DISCHARGE INSTRUCTIONS
ARRIVE DAY OF SURGERY 10:30 AM TO MAIN SURGERY        Take the following medications the morning of surgery with a small sip of water:    NONE    General Instructions:  • Do not eat solid food after midnight the night before surgery.  • You may drink clear liquids day of surgery but must stop at least one hour before your hospital arrival time.  • It is beneficial for you to have a clear drink that contains carbohydrates the day of surgery.  We suggest a 12 to 20 ounce bottle of Gatorade or Powerade for non-diabetic patients or a 12 to 20 ounce bottle of G2 or Powerade Zero for diabetic patients. (Pediatric patients, are not advised to drink a 12 to 20 ounce carbohydrate drink)    Clear liquids are liquids you can see through.  Nothing red in color.     Plain water                               Sports drinks  Sodas                                   Gelatin (Jell-O)  Fruit juices without pulp such as white grape juice and apple juice  Popsicles that contain no fruit or yogurt  Tea or coffee (no cream or milk added)  Gatorade / Powerade  G2 / Powerade Zero    • Infants may have breast milk up to four hours before surgery.  • Infants drinking formula may drink formula up to six hours before surgery.   • Patients who avoid smoking, chewing tobacco and alcohol for 4 weeks prior to surgery have a reduced risk of post-operative complications.  Quit smoking as many days before surgery as you can.  • Do not smoke, use chewing tobacco or drink alcohol the day of surgery.   • If applicable bring your C-PAP/ BI-PAP machine.  • Bring any papers given to you in the doctor’s office.  • Wear clean comfortable clothes and socks.  • Do not wear contact lenses, false eyelashes or make-up.  Bring a case for your glasses.   • Bring crutches or walker if applicable.  • Remove all piercings.  Leave jewelry and any other valuables at home.  • Hair extensions with metal clips must be removed prior to surgery.  • The Pre-Admission Testing  nurse will instruct you to bring medications if unable to obtain an accurate list in Pre-Admission Testing.            Preventing a Surgical Site Infection:  • For 2 to 3 days before surgery, avoid shaving with a razor because the razor can irritate skin and make it easier to develop an infection.    • Any areas of open skin can increase the risk of a post-operative wound infection by allowing bacteria to enter and travel throughout the body.  Notify your surgeon if you have any skin wounds / rashes even if it is not near the expected surgical site.  The area will need assessed to determine if surgery should be delayed until it is healed.  • The night prior to surgery sleep in a clean bed with clean clothing.  Do not allow pets to sleep with you.  • Shower on the morning of surgery using a fresh bar of anti-bacterial soap (such as Dial) and clean washcloth.  Dry with a clean towel and dress in clean clothing.  • Ask your surgeon if you will be receiving antibiotics prior to surgery.  • Make sure you, your family, and all healthcare providers clean their hands with soap and water or an alcohol based hand  before caring for you or your wound.    Day of surgery:  Upon arrival, a Pre-op nurse and Anesthesiologist will review your health history, obtain vital signs, and answer questions you may have.  The only belongings needed at this time will be a list of your home medications and if applicable your C-PAP/BI-PAP machine.  If you are staying overnight your family can leave the rest of your belongings in the car and bring them to your room later.  A Pre-op nurse will start an IV and you may receive medication in preparation for surgery, including something to help you relax.  Your family will be able to see you in the Pre-op area.  While you are in surgery your family should notify the waiting room  if they leave the waiting room area and provide a contact phone number.    Please be aware that  surgery does come with discomfort.  We want to make every effort to control your discomfort so please discuss any uncontrolled symptoms with your nurse.   Your doctor will most likely have prescribed pain medications.      If you are going home after surgery you will receive individualized written care instructions before being discharged.  A responsible adult must drive you to and from the hospital on the day of your surgery and stay with you for 24 hours.    If you are staying overnight following surgery, you will be transported to your hospital room following the recovery period.  [facility] has all private rooms.    You have received a list of surgical assistants for your reference.  If you have any questions please call Pre-Admission Testing at [phone].  Deductibles and co-payments are collected on the day of service. Please be prepared to pay the required co-pay, deductible or deposit on the day of service as defined by your plan.  2% CHLORAHEXIDINE GLUCONATE* CLOTH  Preparing or “prepping” skin before surgery can reduce the risk of infection at the surgical site. To make the process easier, [facility] has chosen disposable cloths moistened with a rinse-free, 2% Chlorhexidine Gluconate (CHG) antiseptic solution. The steps below outline the prepping process and should be carefully followed.        Use the prep cloth on the area that is circled in the diagram             Directions Night before Surgery  1) Shower using a fresh bar of anti-bacterial soap (such as Dial) and clean washcloth.  Use a clean towel to completely dry your skin.  2) Do not use any lotions, oils or creams on your skin.  3) Open the package and remove 1 cloth, wipe your skin for 30 seconds in a circular motion.  Allow to dry for 3 minutes.  4) Repeat #3 with second cloth.  5) Do not touch your eyes, ears, or mouth with the prep cloth.  6) Allow the wet prep solution to air dry.  7) Discard the prep cloth and  wash your hands with soap and water.   8) Dress in clean bed clothes and sleep on fresh clean bed sheets.   9) You may experience some temporary itching after the prep.    Directions Day of Surgery  1) Repeat steps 1,2,3,4,5,6,7, and 9.   2) Dress in clean clothes before coming to the hospital.

## 2019-09-23 ENCOUNTER — ANESTHESIA EVENT (OUTPATIENT)
Dept: PERIOP | Facility: HOSPITAL | Age: 69
End: 2019-09-23

## 2019-09-23 ENCOUNTER — ANESTHESIA (OUTPATIENT)
Dept: PERIOP | Facility: HOSPITAL | Age: 69
End: 2019-09-23

## 2019-09-23 ENCOUNTER — HOSPITAL ENCOUNTER (OUTPATIENT)
Facility: HOSPITAL | Age: 69
Setting detail: HOSPITAL OUTPATIENT SURGERY
Discharge: HOME | End: 2019-09-23
Attending: SURGERY | Admitting: SURGERY

## 2019-09-23 VITALS
OXYGEN SATURATION: 97 % | RESPIRATION RATE: 16 BRPM | WEIGHT: 117.06 LBS | BODY MASS INDEX: 19.99 KG/M2 | HEIGHT: 64 IN | SYSTOLIC BLOOD PRESSURE: 114 MMHG | DIASTOLIC BLOOD PRESSURE: 65 MMHG | TEMPERATURE: 97.7 F | HEART RATE: 70 BPM

## 2019-09-23 DIAGNOSIS — Z85.3 HISTORY OF CANCER OF RIGHT BREAST: ICD-10-CM

## 2019-09-23 DIAGNOSIS — Z90.11 ACQUIRED ABSENCE OF BREAST, RIGHT: ICD-10-CM

## 2019-09-23 PROCEDURE — 25010000002 GENTAMICIN PER 80 MG: Performed by: SURGERY

## 2019-09-23 PROCEDURE — 88305 TISSUE EXAM BY PATHOLOGIST: CPT | Performed by: SURGERY

## 2019-09-23 PROCEDURE — C9290 INJ, BUPIVACAINE LIPOSOME: HCPCS | Performed by: SURGERY

## 2019-09-23 PROCEDURE — 25010000002 ONDANSETRON PER 1 MG: Performed by: NURSE ANESTHETIST, CERTIFIED REGISTERED

## 2019-09-23 PROCEDURE — 25010000003 BUPIVACAINE LIPOSOME 1.3 % SUSPENSION 20 ML VIAL: Performed by: SURGERY

## 2019-09-23 PROCEDURE — 25010000002 FENTANYL CITRATE (PF) 100 MCG/2ML SOLUTION: Performed by: NURSE ANESTHETIST, CERTIFIED REGISTERED

## 2019-09-23 PROCEDURE — 25010000002 NEOSTIGMINE PER 0.5 MG: Performed by: NURSE ANESTHETIST, CERTIFIED REGISTERED

## 2019-09-23 PROCEDURE — 25010000002 DEXAMETHASONE PER 1 MG: Performed by: SURGERY

## 2019-09-23 PROCEDURE — 25010000003 CEFAZOLIN PER 500 MG: Performed by: SURGERY

## 2019-09-23 PROCEDURE — C1789 PROSTHESIS, BREAST, IMP: HCPCS | Performed by: SURGERY

## 2019-09-23 PROCEDURE — 25010000002 PROPOFOL 10 MG/ML EMULSION: Performed by: NURSE ANESTHETIST, CERTIFIED REGISTERED

## 2019-09-23 PROCEDURE — 25010000002 MIDAZOLAM PER 1 MG: Performed by: ANESTHESIOLOGY

## 2019-09-23 PROCEDURE — 25010000002 ROPIVACAINE PER 1 MG: Performed by: SURGERY

## 2019-09-23 DEVICE — SALINE BREAST IMPLANT WITH DIAPHRAGM VALVE, 300CC  SMOOTH ROUND SALINE
Type: IMPLANTABLE DEVICE | Site: BREAST | Status: FUNCTIONAL
Brand: MENTOR SMOOTH ROUND MODERATE PLUS PROFILE

## 2019-09-23 RX ORDER — EPHEDRINE SULFATE 50 MG/ML
INJECTION, SOLUTION INTRAVENOUS AS NEEDED
Status: DISCONTINUED | OUTPATIENT
Start: 2019-09-23 | End: 2019-09-23 | Stop reason: SURG

## 2019-09-23 RX ORDER — DEXAMETHASONE SODIUM PHOSPHATE 10 MG/ML
8 INJECTION INTRAMUSCULAR; INTRAVENOUS ONCE
Status: COMPLETED | OUTPATIENT
Start: 2019-09-23 | End: 2019-09-23

## 2019-09-23 RX ORDER — HYDROMORPHONE HYDROCHLORIDE 1 MG/ML
0.5 INJECTION, SOLUTION INTRAMUSCULAR; INTRAVENOUS; SUBCUTANEOUS
Status: DISCONTINUED | OUTPATIENT
Start: 2019-09-23 | End: 2019-09-23 | Stop reason: HOSPADM

## 2019-09-23 RX ORDER — SODIUM CHLORIDE 9 MG/ML
INJECTION, SOLUTION INTRAVENOUS AS NEEDED
Status: DISCONTINUED | OUTPATIENT
Start: 2019-09-23 | End: 2019-09-23 | Stop reason: HOSPADM

## 2019-09-23 RX ORDER — SODIUM CHLORIDE, SODIUM LACTATE, POTASSIUM CHLORIDE, CALCIUM CHLORIDE 600; 310; 30; 20 MG/100ML; MG/100ML; MG/100ML; MG/100ML
9 INJECTION, SOLUTION INTRAVENOUS CONTINUOUS
Status: DISCONTINUED | OUTPATIENT
Start: 2019-09-23 | End: 2019-09-23 | Stop reason: HOSPADM

## 2019-09-23 RX ORDER — ACETAMINOPHEN 325 MG/1
650 TABLET ORAL ONCE AS NEEDED
Status: DISCONTINUED | OUTPATIENT
Start: 2019-09-23 | End: 2019-09-23 | Stop reason: HOSPADM

## 2019-09-23 RX ORDER — HYDROXYZINE PAMOATE 50 MG/1
50 CAPSULE ORAL ONCE
Status: COMPLETED | OUTPATIENT
Start: 2019-09-23 | End: 2019-09-23

## 2019-09-23 RX ORDER — LIDOCAINE HYDROCHLORIDE 10 MG/ML
0.5 INJECTION, SOLUTION EPIDURAL; INFILTRATION; INTRACAUDAL; PERINEURAL ONCE AS NEEDED
Status: DISCONTINUED | OUTPATIENT
Start: 2019-09-23 | End: 2019-09-23 | Stop reason: HOSPADM

## 2019-09-23 RX ORDER — MIDAZOLAM HYDROCHLORIDE 1 MG/ML
2 INJECTION INTRAMUSCULAR; INTRAVENOUS
Status: DISCONTINUED | OUTPATIENT
Start: 2019-09-23 | End: 2019-09-23 | Stop reason: HOSPADM

## 2019-09-23 RX ORDER — GLYCOPYRROLATE 0.2 MG/ML
INJECTION INTRAMUSCULAR; INTRAVENOUS AS NEEDED
Status: DISCONTINUED | OUTPATIENT
Start: 2019-09-23 | End: 2019-09-23 | Stop reason: SURG

## 2019-09-23 RX ORDER — HYDROCODONE BITARTRATE AND ACETAMINOPHEN 7.5; 325 MG/1; MG/1
1 TABLET ORAL ONCE AS NEEDED
Status: DISCONTINUED | OUTPATIENT
Start: 2019-09-23 | End: 2019-09-23 | Stop reason: HOSPADM

## 2019-09-23 RX ORDER — PROMETHAZINE HYDROCHLORIDE 25 MG/ML
12.5 INJECTION, SOLUTION INTRAMUSCULAR; INTRAVENOUS ONCE AS NEEDED
Status: DISCONTINUED | OUTPATIENT
Start: 2019-09-23 | End: 2019-09-23 | Stop reason: HOSPADM

## 2019-09-23 RX ORDER — CLINDAMYCIN PHOSPHATE 900 MG/50ML
900 INJECTION INTRAVENOUS ONCE
Status: COMPLETED | OUTPATIENT
Start: 2019-09-23 | End: 2019-09-23

## 2019-09-23 RX ORDER — FLUMAZENIL 0.1 MG/ML
0.2 INJECTION INTRAVENOUS AS NEEDED
Status: DISCONTINUED | OUTPATIENT
Start: 2019-09-23 | End: 2019-09-23 | Stop reason: HOSPADM

## 2019-09-23 RX ORDER — MIDAZOLAM HYDROCHLORIDE 1 MG/ML
1 INJECTION INTRAMUSCULAR; INTRAVENOUS
Status: DISCONTINUED | OUTPATIENT
Start: 2019-09-23 | End: 2019-09-23 | Stop reason: HOSPADM

## 2019-09-23 RX ORDER — SODIUM CHLORIDE 0.9 % (FLUSH) 0.9 %
3-10 SYRINGE (ML) INJECTION AS NEEDED
Status: DISCONTINUED | OUTPATIENT
Start: 2019-09-23 | End: 2019-09-23 | Stop reason: HOSPADM

## 2019-09-23 RX ORDER — DOCUSATE SODIUM 100 MG/1
100 CAPSULE, LIQUID FILLED ORAL ONCE
Status: DISCONTINUED | OUTPATIENT
Start: 2019-09-23 | End: 2019-09-23 | Stop reason: HOSPADM

## 2019-09-23 RX ORDER — PROMETHAZINE HYDROCHLORIDE 25 MG/1
25 SUPPOSITORY RECTAL ONCE AS NEEDED
Status: DISCONTINUED | OUTPATIENT
Start: 2019-09-23 | End: 2019-09-23 | Stop reason: HOSPADM

## 2019-09-23 RX ORDER — SODIUM CHLORIDE 0.9 % (FLUSH) 0.9 %
3 SYRINGE (ML) INJECTION EVERY 12 HOURS SCHEDULED
Status: DISCONTINUED | OUTPATIENT
Start: 2019-09-23 | End: 2019-09-23 | Stop reason: HOSPADM

## 2019-09-23 RX ORDER — ONDANSETRON 2 MG/ML
INJECTION INTRAMUSCULAR; INTRAVENOUS AS NEEDED
Status: DISCONTINUED | OUTPATIENT
Start: 2019-09-23 | End: 2019-09-23 | Stop reason: SURG

## 2019-09-23 RX ORDER — FENTANYL CITRATE 50 UG/ML
50 INJECTION, SOLUTION INTRAMUSCULAR; INTRAVENOUS
Status: DISCONTINUED | OUTPATIENT
Start: 2019-09-23 | End: 2019-09-23 | Stop reason: HOSPADM

## 2019-09-23 RX ORDER — DIPHENHYDRAMINE HYDROCHLORIDE 50 MG/ML
12.5 INJECTION INTRAMUSCULAR; INTRAVENOUS
Status: DISCONTINUED | OUTPATIENT
Start: 2019-09-23 | End: 2019-09-23 | Stop reason: HOSPADM

## 2019-09-23 RX ORDER — EPHEDRINE SULFATE 50 MG/ML
5 INJECTION, SOLUTION INTRAVENOUS ONCE AS NEEDED
Status: DISCONTINUED | OUTPATIENT
Start: 2019-09-23 | End: 2019-09-23 | Stop reason: HOSPADM

## 2019-09-23 RX ORDER — CYCLOBENZAPRINE HCL 10 MG
5 TABLET ORAL ONCE
Status: COMPLETED | OUTPATIENT
Start: 2019-09-23 | End: 2019-09-23

## 2019-09-23 RX ORDER — LABETALOL HYDROCHLORIDE 5 MG/ML
5 INJECTION, SOLUTION INTRAVENOUS
Status: DISCONTINUED | OUTPATIENT
Start: 2019-09-23 | End: 2019-09-23 | Stop reason: HOSPADM

## 2019-09-23 RX ORDER — FENTANYL CITRATE 50 UG/ML
INJECTION, SOLUTION INTRAMUSCULAR; INTRAVENOUS AS NEEDED
Status: DISCONTINUED | OUTPATIENT
Start: 2019-09-23 | End: 2019-09-23 | Stop reason: SURG

## 2019-09-23 RX ORDER — ACETAMINOPHEN 325 MG/1
975 TABLET ORAL ONCE
Status: COMPLETED | OUTPATIENT
Start: 2019-09-23 | End: 2019-09-23

## 2019-09-23 RX ORDER — PROPOFOL 10 MG/ML
VIAL (ML) INTRAVENOUS AS NEEDED
Status: DISCONTINUED | OUTPATIENT
Start: 2019-09-23 | End: 2019-09-23 | Stop reason: SURG

## 2019-09-23 RX ORDER — PROMETHAZINE HYDROCHLORIDE 25 MG/1
25 TABLET ORAL ONCE AS NEEDED
Status: DISCONTINUED | OUTPATIENT
Start: 2019-09-23 | End: 2019-09-23 | Stop reason: HOSPADM

## 2019-09-23 RX ORDER — LIDOCAINE HYDROCHLORIDE 20 MG/ML
INJECTION, SOLUTION INFILTRATION; PERINEURAL AS NEEDED
Status: DISCONTINUED | OUTPATIENT
Start: 2019-09-23 | End: 2019-09-23 | Stop reason: SURG

## 2019-09-23 RX ORDER — ACETAMINOPHEN 650 MG
TABLET, EXTENDED RELEASE ORAL AS NEEDED
Status: DISCONTINUED | OUTPATIENT
Start: 2019-09-23 | End: 2019-09-23 | Stop reason: HOSPADM

## 2019-09-23 RX ORDER — OXYCODONE AND ACETAMINOPHEN 7.5; 325 MG/1; MG/1
1 TABLET ORAL ONCE AS NEEDED
Status: DISCONTINUED | OUTPATIENT
Start: 2019-09-23 | End: 2019-09-23 | Stop reason: HOSPADM

## 2019-09-23 RX ORDER — SCOLOPAMINE TRANSDERMAL SYSTEM 1 MG/1
1 PATCH, EXTENDED RELEASE TRANSDERMAL ONCE
Status: DISCONTINUED | OUTPATIENT
Start: 2019-09-23 | End: 2019-09-23 | Stop reason: HOSPADM

## 2019-09-23 RX ORDER — ONDANSETRON 2 MG/ML
4 INJECTION INTRAMUSCULAR; INTRAVENOUS ONCE AS NEEDED
Status: DISCONTINUED | OUTPATIENT
Start: 2019-09-23 | End: 2019-09-23 | Stop reason: HOSPADM

## 2019-09-23 RX ORDER — PROMETHAZINE HYDROCHLORIDE 25 MG/ML
6.25 INJECTION, SOLUTION INTRAMUSCULAR; INTRAVENOUS
Status: DISCONTINUED | OUTPATIENT
Start: 2019-09-23 | End: 2019-09-23 | Stop reason: HOSPADM

## 2019-09-23 RX ORDER — FAMOTIDINE 10 MG/ML
20 INJECTION, SOLUTION INTRAVENOUS ONCE
Status: COMPLETED | OUTPATIENT
Start: 2019-09-23 | End: 2019-09-23

## 2019-09-23 RX ORDER — DIPHENHYDRAMINE HCL 25 MG
25 CAPSULE ORAL
Status: DISCONTINUED | OUTPATIENT
Start: 2019-09-23 | End: 2019-09-23 | Stop reason: HOSPADM

## 2019-09-23 RX ORDER — ONDANSETRON 4 MG/1
4 TABLET, FILM COATED ORAL ONCE AS NEEDED
Status: DISCONTINUED | OUTPATIENT
Start: 2019-09-23 | End: 2019-09-23 | Stop reason: HOSPADM

## 2019-09-23 RX ORDER — BUPIVACAINE HYDROCHLORIDE AND EPINEPHRINE 2.5; 5 MG/ML; UG/ML
INJECTION, SOLUTION INFILTRATION; PERINEURAL AS NEEDED
Status: DISCONTINUED | OUTPATIENT
Start: 2019-09-23 | End: 2019-09-23 | Stop reason: HOSPADM

## 2019-09-23 RX ORDER — NALOXONE HCL 0.4 MG/ML
0.2 VIAL (ML) INJECTION AS NEEDED
Status: DISCONTINUED | OUTPATIENT
Start: 2019-09-23 | End: 2019-09-23 | Stop reason: HOSPADM

## 2019-09-23 RX ORDER — OXYCODONE HYDROCHLORIDE AND ACETAMINOPHEN 5; 325 MG/1; MG/1
1 TABLET ORAL ONCE AS NEEDED
Status: CANCELLED | OUTPATIENT
Start: 2019-09-23 | End: 2019-10-03

## 2019-09-23 RX ORDER — HYDRALAZINE HYDROCHLORIDE 20 MG/ML
5 INJECTION INTRAMUSCULAR; INTRAVENOUS
Status: DISCONTINUED | OUTPATIENT
Start: 2019-09-23 | End: 2019-09-23 | Stop reason: HOSPADM

## 2019-09-23 RX ORDER — CYCLOBENZAPRINE HCL 10 MG
5 TABLET ORAL ONCE AS NEEDED
Status: DISCONTINUED | OUTPATIENT
Start: 2019-09-23 | End: 2019-09-23 | Stop reason: HOSPADM

## 2019-09-23 RX ORDER — BUPIVACAINE HYDROCHLORIDE AND EPINEPHRINE 2.5; 5 MG/ML; UG/ML
INJECTION, SOLUTION EPIDURAL; INFILTRATION; INTRACAUDAL; PERINEURAL AS NEEDED
Status: DISCONTINUED | OUTPATIENT
Start: 2019-09-23 | End: 2019-09-23 | Stop reason: HOSPADM

## 2019-09-23 RX ORDER — ROCURONIUM BROMIDE 10 MG/ML
INJECTION, SOLUTION INTRAVENOUS AS NEEDED
Status: DISCONTINUED | OUTPATIENT
Start: 2019-09-23 | End: 2019-09-23 | Stop reason: SURG

## 2019-09-23 RX ADMIN — CYCLOBENZAPRINE 5 MG: 10 TABLET, FILM COATED ORAL at 11:14

## 2019-09-23 RX ADMIN — ONDANSETRON 4 MG: 2 INJECTION INTRAMUSCULAR; INTRAVENOUS at 13:59

## 2019-09-23 RX ADMIN — ROCURONIUM BROMIDE 35 MG: 10 INJECTION INTRAVENOUS at 12:36

## 2019-09-23 RX ADMIN — DEXAMETHASONE SODIUM PHOSPHATE 8 MG: 10 INJECTION INTRAMUSCULAR; INTRAVENOUS at 11:15

## 2019-09-23 RX ADMIN — ACETAMINOPHEN 975 MG: 325 TABLET, FILM COATED ORAL at 11:15

## 2019-09-23 RX ADMIN — SODIUM CHLORIDE, POTASSIUM CHLORIDE, SODIUM LACTATE AND CALCIUM CHLORIDE: 600; 310; 30; 20 INJECTION, SOLUTION INTRAVENOUS at 13:59

## 2019-09-23 RX ADMIN — PROPOFOL 120 MG: 10 INJECTION, EMULSION INTRAVENOUS at 12:36

## 2019-09-23 RX ADMIN — MIDAZOLAM 2 MG: 1 INJECTION INTRAMUSCULAR; INTRAVENOUS at 12:20

## 2019-09-23 RX ADMIN — SODIUM CHLORIDE, POTASSIUM CHLORIDE, SODIUM LACTATE AND CALCIUM CHLORIDE 9 ML/HR: 600; 310; 30; 20 INJECTION, SOLUTION INTRAVENOUS at 12:20

## 2019-09-23 RX ADMIN — FENTANYL CITRATE 25 MCG: 50 INJECTION INTRAMUSCULAR; INTRAVENOUS at 12:52

## 2019-09-23 RX ADMIN — CLINDAMYCIN PHOSPHATE 900 MG: 900 INJECTION INTRAVENOUS at 12:30

## 2019-09-23 RX ADMIN — GLYCOPYRROLATE 0.4 MG: 0.2 INJECTION INTRAMUSCULAR; INTRAVENOUS at 14:14

## 2019-09-23 RX ADMIN — SCOPALAMINE 1 PATCH: 1 PATCH, EXTENDED RELEASE TRANSDERMAL at 11:15

## 2019-09-23 RX ADMIN — FENTANYL CITRATE 50 MCG: 50 INJECTION INTRAMUSCULAR; INTRAVENOUS at 14:16

## 2019-09-23 RX ADMIN — FAMOTIDINE 20 MG: 10 INJECTION, SOLUTION INTRAVENOUS at 12:20

## 2019-09-23 RX ADMIN — FENTANYL CITRATE 25 MCG: 50 INJECTION INTRAMUSCULAR; INTRAVENOUS at 13:09

## 2019-09-23 RX ADMIN — EPHEDRINE SULFATE 10 MG: 50 INJECTION INTRAMUSCULAR; INTRAVENOUS; SUBCUTANEOUS at 12:59

## 2019-09-23 RX ADMIN — NEOSTIGMINE METHYLSULFATE 3 MG: 1 INJECTION INTRAMUSCULAR; INTRAVENOUS; SUBCUTANEOUS at 14:14

## 2019-09-23 RX ADMIN — EPHEDRINE SULFATE 10 MG: 50 INJECTION INTRAMUSCULAR; INTRAVENOUS; SUBCUTANEOUS at 13:35

## 2019-09-23 RX ADMIN — HYDROXYZINE PAMOATE 50 MG: 50 CAPSULE ORAL at 11:15

## 2019-09-23 RX ADMIN — LIDOCAINE HYDROCHLORIDE 60 MG: 20 INJECTION, SOLUTION INFILTRATION; PERINEURAL at 12:36

## 2019-09-23 NOTE — DISCHARGE INSTRUCTIONS
Please see dr. Mathur's post op instruction sheet included.      [facility] DISCHARGE INSTRUCTIONS.    IMPORTANT: The following information will help you return to your best level of health.  GENERAL ANESTHESIA.  You have had general anesthesia. You were given a medicine to help you go to sleep and not feel pain.    Follow these instructions:   Go right home. Rest quietly at home today, then you can be up and about.   Do not drink alcohol, drive or operate machinery for 24 hours.   Do not make any important decisions or sign any legal papers in the next 24 hours.   Have a RESPONSIBLE PERSON stay with you the rest of today and overnight for your protection and safety.   Start your diet with fluids and light foods (jello, soup, juice, toast). Then eat a normal diet if not nauseated.    Call your doctor if you have:   any blue or gray skin color.   repeated vomiting.   trouble breathing.   any new problems or concerns.

## 2019-09-23 NOTE — OP NOTE
Preoperative diagnosis: Acquired absence of right breast, personal history of right breast cancer, left breast macromastia with asymmetry  Postoperative diagnosis: Same  Procedure: Removal of right tissue expander, placement of right round smooth Dexter subpectoral moderate plus profile saline implant 300 cc size filled to 300 cc, and left breast reduction for symmetry  Surgeon: Fuad Mathur MD  Assistant: Kiki ALONZO  Anesthesia: General endotracheal  Estimated blood loss 20 cc  Drains: X2  Complications: None apparent  Indications for procedure this patient is 69 years old she underwent mastectomy and reconstruction with tissue expander and AlloDerm earlier this year and is undergone serial expansion she is made it quite clear she wants a much smaller implant in the expander presently is filled to and wants to be significantly smaller around the 300 cc range as where she felt like she wanted to be at the area and the expansion process we have ordered appropriate implants for that and have selected a Dexter smooth round moderate plus profile type saline implant 300 cc size.  She understands I cannot guarantee perfect symmetry and I cannot make a got given breast she has significant ptosis and macromastia with the left breast being too large and too low we plan a significant reduction mammoplasty of the left breast and she understands that we will be removing tissue and removing volume from this breast lifting the breast and she adamantly request to remove the nipple areolar complex she is always had nipple inversion here she is concerned about this and does not want the nipple areolar complex left with the reduction we discussed this on 3 occasions now and confirmed it again this morning prior to surgery and she has we have even placed this on the consent form and she does want to proceed ahead with removal of the nipple areolar complex with the reduction on the left side she had no further questions  today prior to surgery and she is marked in an upright and seated position.  We marked her placing the top of our incision just around the nipple areolar complex to excise this and then hyun limbs downward and a very modified Wise pattern type reduction pattern on the left we plan to go through the previous incision on the right side.  Procedure: Patient's brought the operating room placed operative the supine position.  Satisfactory general endotracheal anesthesia achieved without difficulty.  We injected dilute local anesthesia around the periphery of both breast.  We will have this work several minutes while she is prepped and draped in a sterile fashion we initiated surgery by making incision to previous mastectomy incision and elevated skin and subcutaneous tissues off the pectoral muscle on the right side we open the pectoral muscle along its fibers and open the capsule and then identified the port using 18-gauge needle directly aspirated all the saline from the expander.  We then carefully irrigated the pocket and everything appeared to be in good order here the AlloDerm and taken nicely we cauterized the lateral extent of the pocket which was a bit too far lateral and we cauterized the depths of the lateral portion of the pocket and closed this with multiple interrupted figure-of-eight 2-0 PDS sutures to close the lateral extent of the pocket we open the medial extent of the pocket and the superior extent of the pocket and some radial releases were performed as well we are irrigated thoroughly with triple antibiotic containing saline as well as half-strength Betadine cauterized all bleeding points thoroughly placed 15 Northern Irish Garry drain through a separate incision and secured this with nylon suture and irrigated with additional Betadine and antibiotic containing saline.  Using a fresh pair powder free gloves we opened a [place] round smooth moderate plus profile 300 cc device it was free of any gross defects  all air was evacuated we bathed in our triple antibiotic containing saline as well as half-strength Betadine we removed all of the air carefully placed it into the pocket using an introduction sleeve never allowing it to touch the skin we filled it with a closed system technique to 300 cc removed any remaining air and the fill tube was removed and the fill tube plug was seated into position and we irrigated with additional triple antibiotic containing saline and half-strength Betadine the drain was in good position we then closed the muscle layer with running 2-0 Vicryl suture we closed subtendinous layer with 3-0 Vicryl suture 4-0 Vicryl in the deep dermis and 5-0 PDS in particular suture was used to complete the layered closure here this came together nicely and the implant was in excellent position we then turned our attention the left breast where we made our very modified Joshua pattern to include the nipple areolar complex and resected breast tissue from within our modified Joshua pattern leaving appropriate medial and lateral pillars of breast tissue within our modified Joshua pattern a total of 139 g of breast tissue was resected we obtained excellent hemostasis with the Bovie we irrigated with antibiotic containing saline and placed a 15 Turkish Garry drain secured with a nylon suture we brought the incisions together with temporary surgical staples we brought the left and right medial and left lateral pillars together with a few interrupted 3-0 Monocryl's we then carefully brought the direct deep dermis together with multiple 4-0 Vicryls and 3-0 Monocryl and then 5-0 PDS was used to complete the vertical and horizontal limbs this came together nicely symmetry was very good with patient sitting up in an upright position we applied Dermabond to all of the incisions Biopatch is gauze and Tegaderm to the drain exit sites gauze and Tegaderm to the breast incisions on both sides and then ABD pads and 6 inch Ace wrap  for compressive dressing.  She taught procedure well and with recovery area in satisfactory condition.  Sponge counts were correct x2

## 2019-09-23 NOTE — ANESTHESIA POSTPROCEDURE EVALUATION
"Patient: Gayatri Lee    Procedure Summary     Date:  09/23/19 Room / Location:  [facility] OR [address] [place] [address] MAIN OR    Anesthesia Start:  1229 Anesthesia Stop:  1436    Procedure:  RIGHT BREAST TISSUE EXPANDER REMOVAL INSERTION OF IMPLANT LEFT REDUCTION OF SYMMETRY AND REMOVAL OF LEFT NIPPLE (Bilateral Breast) Diagnosis:      Surgeon:  ROCK Mathur MD Provider:  Koby Nowak MD    Anesthesia Type:  general ASA Status:  2          Anesthesia Type: general  Last vitals  BP   117/67 (09/23/19 1530)   Temp   36.5 °C (97.7 °F) (09/23/19 1530)   Pulse   83 (09/23/19 1530)   Resp   16 (09/23/19 1530)     SpO2   98 % (09/23/19 1530)     Post Anesthesia Care and Evaluation    Patient location during evaluation: bedside  Patient participation: complete - patient participated  Level of consciousness: awake and alert  Pain management: adequate  Airway patency: patent  Anesthetic complications: No anesthetic complications    Cardiovascular status: acceptable  Respiratory status: acceptable  Hydration status: acceptable    Comments: /67   Pulse 83   Temp 36.5 °C (97.7 °F) (Oral)   Resp 16   Ht 162.6 cm (64\")   Wt 53.1 kg (117 lb 1 oz)   SpO2 98%   BMI 20.09 kg/m²       "

## 2019-09-23 NOTE — ANESTHESIA PROCEDURE NOTES
Airway  Urgency: elective    Date/Time: 9/23/2019 12:39 PM  Airway not difficult    General Information and Staff    Patient location during procedure: OR  Anesthesiologist: Koby Nowak MD  CRNA: Sharlene Ewing CRNA    Indications and Patient Condition  Indications for airway management: airway protection    Preoxygenated: yes  MILS maintained throughout  Mask difficulty assessment: 1 - vent by mask    Final Airway Details  Final airway type: endotracheal airway      Successful airway: ETT  Cuffed: yes   Successful intubation technique: direct laryngoscopy  Facilitating devices/methods: anterior pressure/BURP  Endotracheal tube insertion site: oral  Blade: Cardoso  Blade size: 2  ETT size (mm): 6.5  Cormack-Lehane Classification: grade IIb - view of arytenoids or posterior of glottis only  Placement verified by: chest auscultation and capnometry   Cuff volume (mL): 8  Measured from: lips  ETT/EBT  to lips (cm): 20  Number of attempts at approach: 1  Assessment: lips, teeth, and gum same as pre-op and atraumatic intubation    Additional Comments  Pt preoxygenated, SIVI, bag mask vent, ATETI, dentition as before

## 2019-09-23 NOTE — ANESTHESIA PREPROCEDURE EVALUATION
Anesthesia Evaluation     Patient summary reviewed   no history of anesthetic complications:  NPO Solid Status: > 8 hours  NPO Liquid Status: > 2 hours           Airway   Mallampati: II  TM distance: >3 FB  Neck ROM: full  Possible difficult intubation  Comment: Gr 2B view last time  Dental      Pulmonary     breath sounds clear to auscultation  (-) shortness of breath, not a smoker  Cardiovascular   Exercise tolerance: good (4-7 METS)    Rhythm: regular  Rate: normal    (+) hyperlipidemia,   (-) angina, THROPE      Neuro/Psych  GI/Hepatic/Renal/Endo      Musculoskeletal     Abdominal    Substance History      OB/GYN          Other   (+) arthritis   history of cancer                  Anesthesia Plan    ASA 2     general     intravenous induction   Anesthetic plan, all risks, benefits, and alternatives have been provided, discussed and informed consent has been obtained with: patient and child.

## 2019-09-25 LAB
CYTO UR: NORMAL
LAB AP CASE REPORT: NORMAL
LAB AP CLINICAL INFORMATION: NORMAL
PATH REPORT.FINAL DX SPEC: NORMAL
PATH REPORT.GROSS SPEC: NORMAL

## 2019-10-17 NOTE — PROGRESS NOTES
Subjective     REASON FOR CONSULTATION:   1.Extensive DCIS right breast not mammographically detected low-grade ER positive NJ negative post mastectomy and sentinel node biopsy Tis Nx  2.  Chemoprevention with tamoxifen initiated 7/19                             REQUESTING PHYSICIAN: MD Leanna Sharp MD    History of Present Illness patient is a 69-year-old lady with extensive DCIS postmastectomy on chemoprevention with tamoxifen intermittently for the last 3 months.  She started for a while and then had to stop for surgery and resumed treatment about 3 weeks ago and overall her biggest complaint is recurrent yeast infections and vaginal infections.    She was having recurrent UTIs for which she was using low-dose estrogen cream to the vaginal and urethral area once or twice a week but she states she has not used this in over a month.    She is otherwise tolerating the tamoxifen okay and I suggested that if the UTIs recur or if her vaginal infections persist she could cut her dose back to 10 mg and see if this helps    We discussed again the data regarding the use of tamoxifen 5 mg which also had a beneficial effect for prevention but as long as she can take the higher dose I think I would keep her on it especially since the side effect is infectious and not DVT or terrible joint pains.      Past Medical History:   Diagnosis Date   • Arthritis    • Basal cell carcinoma     followed by Dermatology   • Chronic gastritis    • DCIS (ductal carcinoma in situ)     followed by Dr. Denny (RIGHT)   • Dry eye    • Elevated HDL    • History of colon polyps     followed by GI, Dr. Cuba   • HSV-2 seropositive    • Melanoma (CMS/HCC)    • Osteopenia     followed by Gynecology, Dr. Tequila Boyle   • Pelvic prolapse    • Recurrent sinusitis    • Slow to wake up after anesthesia    • Tinnitus         Past Surgical History:   Procedure  Laterality Date   • ABDOMINOPLASTY  2004   • ADENOIDECTOMY  1955   • BREAST RECONSTRUCTION Right 5/15/2019    Procedure: RIGHT PLACEMENT OF TISSUE EXPANDER WITH ALLODERM;  Surgeon: ROCK Mathur MD;  Location: [facility];  Service: Plastics   • BREAST SURGERY Left 1976    LUMPECTOMY   • BREAST SURGERY Left 2004    LUMPECTOMY   • BREAST TISSUE EXPANDER REMOVAL INSERTION OF IMPLANT Bilateral 9/23/2019    Procedure: RIGHT BREAST TISSUE EXPANDER REMOVAL INSERTION OF IMPLANT LEFT REDUCTION OF SYMMETRY AND REMOVAL OF LEFT NIPPLE;  Surgeon: ROCK Mathur MD;  Location: [facility];  Service: Plastics   • BROW LIFT AND BLEPHAROPLASTY  2004   • COLONOSCOPY  06/03/2013    TUBULAR ADENOMAS, TORT COLON,  DR. GOMEZ   • COLONOSCOPY W/ POLYPECTOMY     • CYSTOCELE REPAIR  05/2017   • DILATATION AND CURETTAGE     • ENDOSCOPY     • EYE SURGERY     • HAND SURGERY  1990   • HYSTERECTOMY  2017   • LAPAROTOMY OOPHERECTOMY  1994   • LARYNGOSCOPY      IRRITATION   DR. CORREA   • MASTECTOMY W/ SENTINEL NODE BIOPSY Right 5/15/2019    Procedure: RIGHT BREAST MASTECTOMY WITH SENTINEL NODE BIOPSY;  Surgeon: Daniel Denny MD;  Location: [facility];  Service: General   • OOPHORECTOMY     • TONSILLECTOMY  1955   • TUBAL ABDOMINAL LIGATION  1980   • WISDOM TOOTH EXTRACTION  1970    ONC HISTORY  patient is a 69-year-old female with no chronic medical illnesses but significant problems over the years with fibrocystic breast disease requiring cyst aspirations and a previous biopsy of her left breast in 1976 and then again in 2004 when she had a lumpectomy on the left.  She was admitted to the hospital with cellulitis in the right breast and a CAT scan of the chest was done which showed significant enlargement of the right breast compared to the left worrisome for underlying pathology.  She had a mammogram on 11/19/2018 which was not suspicious.Patient underwent ultrasound-guided biopsy on 3/14/2019 which showed low-grade  DCIS ER 25% CO negative.  Patient then had bilateral breast MRIs on 2019 and this showed fairly extensive enhancement in the right breast suspicious for extensive DCIS with no abnormality in the left breast detected.  Based on these findings the patient opted to have a mastectomy with immediate reconstruction which was done on 5/15/2019,\pathology revealed a 10 x 9 cm area of low-grade DCIS with clear margins there is micropapillary and papillary subtypes with low to intermediate nuclear grade with no necrosis.  4 sentinel nodes were negative for metastatic disease    Postoperatively she has had a lot of pain from her implant but is not been good about taking her pain medicines and is suffering because of this.  She is  3 para 3 menarche was at age 14 menopause 15 years ago.  She has since had a hysterectomy and one ovary removed at the time of cystocele and rectocele repair in 2017.  First childbirth was at age 26.  Patient took Vagifem for urinary problems related to vaginal atrophy since  and for the last 2 years has been on progesterone replacement and then for the last year and estradiol patch all of which she discontinued when her DCIS was diagnosed 3 months ago    There is no family history of breast or ovarian cancer in her family mother had scleroderma.     explained to Gayatri that at this point-with the multiple previous biopsies in the left breast and the extensive DCIS in the right breast she would be at a significant risk of DCIS or invasive cancer in the left breast.  I suspect she would do better with tamoxifen then with an aromatase inhibitor because of her vaginal and urinary issues for which she was taking vaginal estrogens.  I suggested she try the 20 mg dose and if she has trouble tolerating this because of urinary complaints we could either add back a low-dose vaginal estrogens which probably would be blocked by the tamoxifen or consider decreasing her tamoxifen dose to 10 mg  which may also be as efficacious for prevention    Obviously if she she has poor tolerance no treatment would be given I also recommended that she would get MRIs for the remaining breast because of the inability ofStandard imaging to detect her disease    At this point she is planning to start her tamoxifen in about a month and then we will see her back after her permanent implants are placed in September to assess her tolerance  She will call if her urinary symptoms worsen on the tamoxifen to discuss how best to deal with this        Current Outpatient Medications on File Prior to Visit   Medication Sig Dispense Refill   • CALCIUM LACTATE PO Take 1,000 mg by mouth Daily.     • cholecalciferol (VITAMIN D3) 1000 units tablet Take 1,000 Units by mouth Daily.     • EVENING PRIMROSE OIL PO Take 2 capsules by mouth Daily.     • magnesium oxide (MAG-OX) 400 tablet tablet Take 400 mg by mouth Daily.     • Menaquinone-7 (VITAMIN K2 PO) Take 200 mcg by mouth Daily.     • NON FORMULARY 60 mg Daily. maqui berry extract 60mg daily for dry eye      • Probiotic Product (PROBIOTIC ADVANCED PO) Take 1 tablet by mouth Daily.     • tamoxifen (NOLVADEX) 20 MG chemo tablet Take 20 mg by mouth Daily.     • TURMERIC PO Take 1 each by mouth Daily.       No current facility-administered medications on file prior to visit.         ALLERGIES:    Allergies   Allergen Reactions   • Sulfa Antibiotics Rash        Social History     Socioeconomic History   • Marital status:      Spouse name: Not on file   • Number of children: 3   • Years of education: College   • Highest education level: Not on file   Occupational History   • Occupation: Homemaker     Employer: RETIRED   Tobacco Use   • Smoking status: Never Smoker   • Smokeless tobacco: Never Used   Substance and Sexual Activity   • Alcohol use: Yes     Frequency: 2-4 times a month     Drinks per session: 1 or 2     Binge frequency: Never     Comment: one drink a week or less   • Drug  use: No   • Sexual activity: Defer   Social History Narrative    Lives at home in a place of her own.  Enjoys tap and ballroom dance.          Family History   Problem Relation Age of Onset   • Cancer Father         Skin   • Heart disease Father    • Stroke Father    • Cancer Sister         skin   • Macular degeneration Sister    • Arthritis Sister    • Alcohol abuse Mother    • Scleroderma Mother    • Malig Hyperthermia Neg Hx         Review of Systems   Constitutional: Positive for fatigue (10/18/19).   Respiratory: Positive for chest tightness (tenderness under right arm and breast 10/18/19).    Cardiovascular: Positive for chest pain (same 10/18/19).   Gastrointestinal: Positive for constipation (10/18/19).   Musculoskeletal: Positive for back pain (10/18/19). Negative for arthralgias and joint swelling.   Psychiatric/Behavioral: Positive for sleep disturbance (10/18/19).        Objective     There were no vitals filed for this visit.  Current Status 6/13/2019   ECOG score 0       Physical Exam   Pulmonary/Chest:           GENERAL:  Well-developed, well-nourished in no acute distress.   SKIN:  Warm, dry without rashes, purpura or petechiae.  EYES:  Pupils equal, round and reactive to light.  EOMs intact.  Conjunctivae normal.  EARS:  Hearing intact.  NOSE:  Septum midline.  No excoriations or nasal discharge.  MOUTH:  Tongue is well-papillated; no stomatitis or ulcers.  Lips normal.  THROAT:  Oropharynx without lesions or exudates.  NECK:  Supple with good range of motion; no thyromegaly or masses, no JVD.  LYMPHATICS:  No cervical, supraclavicular, axillary or inguinal adenopathy.  CHEST:  Lungs clear to auscultation. Good airflow.  BREASTS: Right breast shows an implant in place with well-healed incisions the left breast shows a lift and removal of the nipple areolar  complex  CARDIAC:  Regular rate and rhythm without murmurs, rubs or gallops. Normal S1,S2.  ABDOMEN:  Soft, nontender with no hepatosplenomegaly  or masses.  EXTREMITIES:  No clubbing, cyanosis or edema.  NEUROLOGICAL:  Cranial Nerves II-XII grossly intact.  No focal neurological deficits.  PSYCHIATRIC:  Normal affect and mood.        RECENT LABS:  Hematology WBC   Date Value Ref Range Status   09/16/2019 4.56 3.40 - 10.80 10*3/mm3 Final   02/28/2019 5.41 3.40 - 10.80 10*3/mm3 Final     RBC   Date Value Ref Range Status   09/16/2019 4.38 3.77 - 5.28 10*6/mm3 Final   02/28/2019 4.34 3.77 - 5.28 10*6/mm3 Final     Hemoglobin   Date Value Ref Range Status   09/16/2019 12.8 12.0 - 15.9 g/dL Final     Hematocrit   Date Value Ref Range Status   09/16/2019 39.7 34.0 - 46.6 % Final     Platelets   Date Value Ref Range Status   09/16/2019 256 140 - 450 10*3/mm3 Final                BILATERAL BREAST MRI WITHOUT AND WITH CONTRAST  IMPRESSION:  1. There is considerable abnormal enhancement occupying the entirety of  the upper inner and upper outer quadrants of the right breast spanning a  region measuring approximately 10.5 cm in greatest dimension. There may  be involvement of the right nipple as well. This is most consistent with  extensive in situ disease in the upper inner and upper outer quadrants  of the right breast. No evidence for right axillary adenopathy is  appreciated.  2. There are no findings suspicious for malignancy in the left breast.     BI-RADS Category 6: Known biopsy-proven malignancy.     This report was finalized on 4/15/2019     Tissue Pathology Exam: [id number]   Order: [id number]   Collected:  5/15/2019 13:39 Status:  Final result   Visible to patient:  Yes (MyChart) Dx:  Ductal carcinoma in situ (DCIS) of ri...   Component    Final Diagnosis   1. Breast, Right Lower Flap True Anterior Margin, Excision:               A. Benign breast parenchyma with clustered apocrine cysts, final margin free of neoplasm by an additional 5 mm.     2. Lymph Node, Right [place] #1, Excision:               A. Benign lymph node (0/1).                 3. Lymph Node,  Right [place] #2, Excision:               A. Benign lymph node (0/1).     4. [place] Lymph Node, Right #3, Excision:               A. Benign lymph node (0/1).     5. Lymph Node, Right [place] #4, Excision:               A. Benign lymph node (0/1).     6. Breast, Right, Mastectomy (563 grams):               A. Ductal carcinoma in situ (DCIS), micropapillary and papillary subtypes with low to intermediate nuclear grade,                    no necrosis identified.  All margins are free of in situ carcinoma.               B. 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               C. Non-neoplastic breast parenchyma shows focal areas of adenosis and clustered cysts with apocrine                    metaplasia and microcalcifications identified in association with benign ductal structures.               D. See synoptic template below for complete tumor details.     [facility]/jse/pkm   Electronically signed by Lary De La Cruz MD for Bueno, Sydni GONZALEZ MD on 5/20/2019                      Assessment/Plan    1.  Low-grade DCIS extensive involvement of the right breast ER positive ME negative post mastectomy with immediate reconstruction  2.  Postoperative pain due to expander  3.  Urinary difficulty due to postmenopausal estrogen deprivation using vaginal estrogens sparingly   4.  Negative family history of breast or ovarian cancer    Plan  1.  Tamoxifen 20 mg daily for chemoprevention-can cut to 10 mg or less if her recurrent vaginal infections do not clear up  2.  Can use low-dose vaginal estrogen to the urethral area for recurrent infections once a week  3.  Return in 6 months with repeat 3D mammography in January and we will do an MRI next year for follow-up because of the dense breasts and the fact that the DCIS was not detected on mammography

## 2019-10-18 ENCOUNTER — LAB (OUTPATIENT)
Dept: LAB | Facility: HOSPITAL | Age: 69
End: 2019-10-18

## 2019-10-18 ENCOUNTER — OFFICE VISIT (OUTPATIENT)
Dept: ONCOLOGY | Facility: CLINIC | Age: 69
End: 2019-10-18

## 2019-10-18 VITALS
TEMPERATURE: 98.4 F | RESPIRATION RATE: 16 BRPM | HEIGHT: 64 IN | DIASTOLIC BLOOD PRESSURE: 61 MMHG | BODY MASS INDEX: 20.33 KG/M2 | OXYGEN SATURATION: 99 % | SYSTOLIC BLOOD PRESSURE: 97 MMHG | HEART RATE: 66 BPM | WEIGHT: 119.1 LBS

## 2019-10-18 DIAGNOSIS — D05.11 DUCTAL CARCINOMA IN SITU (DCIS) OF RIGHT BREAST: ICD-10-CM

## 2019-10-18 DIAGNOSIS — D05.11 DUCTAL CARCINOMA IN SITU (DCIS) OF RIGHT BREAST: Primary | ICD-10-CM

## 2019-10-18 LAB
ALBUMIN SERPL-MCNC: 4.3 G/DL (ref 3.5–5.2)
ALBUMIN/GLOB SERPL: 1.5 G/DL (ref 1.1–2.4)
ALP SERPL-CCNC: 58 U/L (ref 38–116)
ALT SERPL W P-5'-P-CCNC: 18 U/L (ref 0–33)
ANION GAP SERPL CALCULATED.3IONS-SCNC: 10.5 MMOL/L (ref 5–15)
AST SERPL-CCNC: 20 U/L (ref 0–32)
BASOPHILS # BLD AUTO: 0.08 10*3/MM3 (ref 0–0.2)
BASOPHILS NFR BLD AUTO: 1.6 % (ref 0–1.5)
BILIRUB SERPL-MCNC: 0.3 MG/DL (ref 0.2–1.2)
BUN BLD-MCNC: 18 MG/DL (ref 6–20)
BUN/CREAT SERPL: 23.1 (ref 7.3–30)
CALCIUM SPEC-SCNC: 9.7 MG/DL (ref 8.5–10.2)
CHLORIDE SERPL-SCNC: 100 MMOL/L (ref 98–107)
CO2 SERPL-SCNC: 28.5 MMOL/L (ref 22–29)
CREAT BLD-MCNC: 0.78 MG/DL (ref 0.6–1.1)
DEPRECATED RDW RBC AUTO: 45.1 FL (ref 37–54)
EOSINOPHIL # BLD AUTO: 0.17 10*3/MM3 (ref 0–0.4)
EOSINOPHIL NFR BLD AUTO: 3.3 % (ref 0.3–6.2)
ERYTHROCYTE [DISTWIDTH] IN BLOOD BY AUTOMATED COUNT: 13.3 % (ref 12.3–15.4)
GFR SERPL CREATININE-BSD FRML MDRD: 73 ML/MIN/1.73
GLOBULIN UR ELPH-MCNC: 2.9 GM/DL (ref 1.8–3.5)
GLUCOSE BLD-MCNC: 94 MG/DL (ref 74–124)
HCT VFR BLD AUTO: 39.2 % (ref 34–46.6)
HGB BLD-MCNC: 12.5 G/DL (ref 12–15.9)
IMM GRANULOCYTES # BLD AUTO: 0.02 10*3/MM3 (ref 0–0.05)
IMM GRANULOCYTES NFR BLD AUTO: 0.4 % (ref 0–0.5)
LYMPHOCYTES # BLD AUTO: 1.65 10*3/MM3 (ref 0.7–3.1)
LYMPHOCYTES NFR BLD AUTO: 32.4 % (ref 19.6–45.3)
MCH RBC QN AUTO: 29.4 PG (ref 26.6–33)
MCHC RBC AUTO-ENTMCNC: 31.9 G/DL (ref 31.5–35.7)
MCV RBC AUTO: 92.2 FL (ref 79–97)
MONOCYTES # BLD AUTO: 0.49 10*3/MM3 (ref 0.1–0.9)
MONOCYTES NFR BLD AUTO: 9.6 % (ref 5–12)
NEUTROPHILS # BLD AUTO: 2.68 10*3/MM3 (ref 1.7–7)
NEUTROPHILS NFR BLD AUTO: 52.7 % (ref 42.7–76)
NRBC BLD AUTO-RTO: 0 /100 WBC (ref 0–0.2)
PLATELET # BLD AUTO: 245 10*3/MM3 (ref 140–450)
PMV BLD AUTO: 9.7 FL (ref 6–12)
POTASSIUM BLD-SCNC: 4.6 MMOL/L (ref 3.5–4.7)
PROT SERPL-MCNC: 7.2 G/DL (ref 6.3–8)
RBC # BLD AUTO: 4.25 10*6/MM3 (ref 3.77–5.28)
SODIUM BLD-SCNC: 139 MMOL/L (ref 134–145)
WBC NRBC COR # BLD: 5.09 10*3/MM3 (ref 3.4–10.8)

## 2019-10-18 PROCEDURE — 80053 COMPREHEN METABOLIC PANEL: CPT

## 2019-10-18 PROCEDURE — 36415 COLL VENOUS BLD VENIPUNCTURE: CPT

## 2019-10-18 PROCEDURE — 99214 OFFICE O/P EST MOD 30 MIN: CPT | Performed by: INTERNAL MEDICINE

## 2019-10-18 PROCEDURE — 85025 COMPLETE CBC W/AUTO DIFF WBC: CPT

## 2019-10-18 PROCEDURE — G0463 HOSPITAL OUTPT CLINIC VISIT: HCPCS | Performed by: INTERNAL MEDICINE

## 2019-10-18 RX ORDER — NYSTATIN AND TRIAMCINOLONE ACETONIDE 100000; 1 [USP'U]/G; MG/G
OINTMENT TOPICAL
Refills: 0 | COMMUNITY
Start: 2019-09-18 | End: 2020-11-12

## 2019-10-18 RX ORDER — METRONIDAZOLE 7.5 MG/G
GEL VAGINAL
COMMUNITY
Start: 2019-10-15 | End: 2020-11-12

## 2019-10-24 ENCOUNTER — HOSPITAL ENCOUNTER (OUTPATIENT)
Dept: PHYSICAL THERAPY | Facility: HOSPITAL | Age: 69
Setting detail: THERAPIES SERIES
Discharge: HOME | End: 2019-10-24

## 2019-10-24 DIAGNOSIS — M75.41 IMPINGEMENT SYNDROME OF RIGHT SHOULDER: ICD-10-CM

## 2019-10-24 DIAGNOSIS — R29.3 POOR POSTURE: ICD-10-CM

## 2019-10-24 DIAGNOSIS — Z90.11 S/P RIGHT MASTECTOMY: ICD-10-CM

## 2019-10-24 DIAGNOSIS — M25.511 CHRONIC RIGHT SHOULDER PAIN: ICD-10-CM

## 2019-10-24 DIAGNOSIS — Z42.1 AFTERCARE POSTMASTECTOMY FOR BREAST RECONSTRUCTION: ICD-10-CM

## 2019-10-24 DIAGNOSIS — D05.11 DUCTAL CARCINOMA IN SITU (DCIS) OF RIGHT BREAST: Primary | ICD-10-CM

## 2019-10-24 DIAGNOSIS — M25.611 DECREASED RANGE OF MOTION OF RIGHT SHOULDER: ICD-10-CM

## 2019-10-24 DIAGNOSIS — G89.29 CHRONIC RIGHT SHOULDER PAIN: ICD-10-CM

## 2019-10-24 PROCEDURE — 97110 THERAPEUTIC EXERCISES: CPT | Performed by: PHYSICAL THERAPIST

## 2019-10-24 PROCEDURE — 97164 PT RE-EVAL EST PLAN CARE: CPT | Performed by: PHYSICAL THERAPIST

## 2019-10-24 PROCEDURE — 97140 MANUAL THERAPY 1/> REGIONS: CPT | Performed by: PHYSICAL THERAPIST

## 2019-10-24 NOTE — THERAPY RE-EVALUATION
Outpatient Physical Therapy Ortho Initial Evaluation  Psychiatric     Patient Name: Gayatri Lee  : 1950  MRN: [id number]  Today's Date: 10/24/2019      Visit Date: 10/24/2019    Patient Active Problem List   Diagnosis   • Right foot pain   • Arthritis of big toe   • Acquired hallux rigidus of right foot   • Osteopenia   • History of colon polyps   • Recurrent sinusitis   • Menopausal symptoms   • Prediabetes   • Hyperlipidemia   • Cellulitis of right breast   • Mass of breast, right   • Ductal carcinoma in situ (DCIS) of right breast        Past Medical History:   Diagnosis Date   • Arthritis    • Basal cell carcinoma     followed by Dermatology   • Chronic gastritis    • DCIS (ductal carcinoma in situ)     followed by Dr. Denny (RIGHT)   • Dry eye    • Elevated HDL    • History of colon polyps     followed by GI, Dr. Cuba   • HSV-2 seropositive    • Melanoma (CMS/HCC)    • Osteopenia     followed by Gynecology, Dr. Tequila Boyle   • Pelvic prolapse    • Recurrent sinusitis    • Slow to wake up after anesthesia    • Tinnitus         Past Surgical History:   Procedure Laterality Date   • ABDOMINOPLASTY     • ADENOIDECTOMY     • BREAST RECONSTRUCTION Right 5/15/2019    Procedure: RIGHT PLACEMENT OF TISSUE EXPANDER WITH ALLODERM;  Surgeon: ROCK Mathur MD;  Location: [facility];  Service: Plastics   • BREAST SURGERY Left     LUMPECTOMY   • BREAST SURGERY Left     LUMPECTOMY   • BREAST TISSUE EXPANDER REMOVAL INSERTION OF IMPLANT Bilateral 2019    Procedure: RIGHT BREAST TISSUE EXPANDER REMOVAL INSERTION OF IMPLANT LEFT REDUCTION OF SYMMETRY AND REMOVAL OF LEFT NIPPLE;  Surgeon: ROCK Mathur MD;  Location: [facility];  Service: Plastics   • BROW LIFT AND BLEPHAROPLASTY     • COLONOSCOPY  2013    TUBULAR ADENOMAS, TORT COLON,  DR. CUBA   • COLONOSCOPY W/ POLYPECTOMY     • CYSTOCELE REPAIR  2017   • DILATATION AND CURETTAGE     • ENDOSCOPY     •  EYE SURGERY     • HAND SURGERY  1990   • HYSTERECTOMY  2017   • LAPAROTOMY OOPHERECTOMY  1994   • LARYNGOSCOPY      IRRITATION   DR. CORREA   • MASTECTOMY W/ SENTINEL NODE BIOPSY Right 5/15/2019    Procedure: RIGHT BREAST MASTECTOMY WITH SENTINEL NODE BIOPSY;  Surgeon: Daniel Denny MD;  Location: [facility] OR;  Service: General   • OOPHORECTOMY     • TONSILLECTOMY  1955   • TUBAL ABDOMINAL LIGATION  1980   • WISDOM TOOTH EXTRACTION  1970       Visit Dx:     ICD-10-CM ICD-9-CM   1. Ductal carcinoma in situ (DCIS) of right breast D05.11 233.0   2. S/P right mastectomy Z90.11 V45.71   3. Aftercare postmastectomy for breast reconstruction Z42.1 V51.0   4. Decreased range of motion of right shoulder M25.611 719.51   5. Chronic right shoulder pain M25.511 719.41    G89.29 338.29   6. Poor posture R29.3 781.92   7. Impingement syndrome of right shoulder M75.41 726.2             PT Ortho     Row Name 10/24/19 0937       Subjective Comments    Subjective Comments  My reconstruction went ok. 09/23/2019 Dr. Mathur. TE to implant right, reduction lift left. I was way more sore on the right than the left which was unexpected. He did a lot of work on the right he said to get rid of scar tissue. I had the drains removed within the week. I started back gentle to yoga about 2 weeks ago with no weight bearing. Started back weight bearing this week. Going ok. The back of my shoulder is sore. But he told me no repetitive exercises up to 6 weeks. I go back to him early Dec. I just want to get back into my exercise routine and I think I wasn't expecting it to take this long. I have one of the loose bras on (true target, good fit). I did buy one without underwire that I will start after I see him in Dec. I am just frustrated right now. Like I didn't get enough info. My range of motion is pretty good though. Before the reconstruction I do feel like my right shoulder was back to 100%, now hurting again. Going to a dance this  weekend. Usually dance for 4 hours. I will try to only dance for 2 or so. If it hurts I will stop.   -SC       Subjective Pain    Able to rate subjective pain?  yes  -SC    Pre-Treatment Pain Level  4  -SC    Subjective Pain Comment  faces  -SC       Posture/Observations    Alignment Options  Forward head;Cervical lordosis;Thoracic kyphosis;Rounded shoulders;Scapular elevation;Scapular winging  -SC    Forward Head  Mild;Increased;Sitting posture  -SC    Cervical Lordosis  Normal  -SC    Thoracic Kyphosis  Mild;Increased;Sitting posture  -SC    Rounded Shoulders  Mild;Moderate;Increased;Sitting posture  -SC    Scapular Elevation  Right:;Mild;Increased;Sitting posture  -SC    Scapular winging  Normal  -SC    Observations  Incision healing  -SC       Special Tests/Palpation    Special Tests/Palpation  Shoulder  -SC       Shoulder Girdle Accessory Motions    Shoulder Girdle Accessory Motions Tested?  Yes  -SC    Posterior glide of humerus  Right:;Hypomobile;Right pain  -SC    Inferior glide of humerus  Right:;Hypomobile;Right pain  -SC    Lateral distraction  Right:;Hypomobile;Right pain  -SC    Long axis distraction  Right:;Hypomobile;Right pain  -SC       Shoulder Impingement/Rotator Cuff Special Tests    Flower-Hudson Test (RC Lesion vs. Bursitis)  Right:;Positive  -SC    Neer Impingement Test (RC Lesion vs. Bursitis)  Right:;Positive  -SC    Full Can Test (RC Lesion)  Right:;Positive  -SC    Empty Can Test (RC Lesion)  Right:;Positive  -SC    Drop Arm Test (Full Thickness RC Lesion)  Right:;Negative  -SC    Internal Impingement Sign  Right:;Positive  -SC    Lift-Off Test (Subscapularis Lesion)  Right:;Negative positive for pain and decreased range of motion  -SC       Shoulder Girdle Palpation    Shoulder Girdle Palpation?  Yes  -SC    Subacromial Space  Right:;Tender;Guarded/taut  -SC    Supraspinatus Insertion  Right:;Tender;Guarded/taut  -SC    Long Head of Biceps  Right:;Tender;Guarded/taut;Swollen  -SC     Pect Minor  Right:;Tender;Guarded/taut  -SC       General ROM    GENERAL ROM COMMENTS  B UEs WFLs positive painful arc and impingement right shoulder  -SC       MMT (Manual Muscle Testing)    General MMT Comments  right shoulder grossly 4/5 R scapula 2+/5  -SC       Pathomechanics    Upper Extremity Pathomechanics  Excessive scapular elevation;Limited scapular upward rotation;Limited thoracic extension;Limited glenohumeral internal rotation  -SC       Gait/Stairs Assessment/Training    Bilateral Gait Deviations  forward flexed posture  -SC      User Key  (r) = Recorded By, (t) = Taken By, (c) = Cosigned By    Initials Name Provider Type    SC Rosemary Brewster PT Physical Therapist              Lymphedema     Row Name 10/24/19 0937             Lymphedema Assessment    Lymphedema Classification  RUE:;at risk/stage 0;secondary at risk  -SC      Lymphedema Cancer Related Sx  right;modified radical mastectomy;reconstructive;sentinel node biopsy  -SC      Lymphedema Surgery Comments  May 2019, reconstruction Sept 2019  -SC      Lymph Nodes Removed #  4  -SC      Positive Lymph Nodes #  0  -SC      Chemo Received  no  -SC      Radiation Therapy Received  no  -SC      Infections or Cellulitis?  no  -SC      Lymphedema Assessment Comments  low risk R UE  -SC         Lymphedema Edema Assessment    Edema Assessment Comment  mild edema left breast with reconstruction, healing well  -SC         Skin Changes/Observations    Skin Observations Comment  minimal scar tissue, instructed scar massage  -SC         Lymphedema Sensation    Lymphedema Sensation Comments  intact  -SC         Lymphedema Pulses/Capillary Refill    Lymph Pulses Capillary Refill Comments  intact  -SC         Manual Lymphatic Drainage    Manual Therapy  see manual tab  -SC         Compression/Skin Care    Compression/Skin Care Comments  noted proper fitting bra  -SC        User Key  (r) = Recorded By, (t) = Taken By, (c) = Cosigned By    Initials Name  Provider Type    SC Rosemary Brewster, PT Physical Therapist              Therapy Education  Education Details: scar massage, breast implant mobilizations, bra fitting when indicated, surgical report/prognosis/reasons for symptoms, ice for pain management and inflammation right scapula, return to exercise/dance, return to HEP with theraband, continuation of care, reasons to call and return sooner than 3 weeks   Given: HEP, Symptoms/condition management, Pain management, Posture/body mechanics, Mobility training, Other (comment)  Program: Progressed, Modified  How Provided: Verbal, Demonstration, Written  Provided to: Patient  Level of Understanding: Teach back education performed, Verbalized, Demonstrated     PT OP Goals     Row Name 10/24/19 0937          PT Short Term Goals    STG Date to Achieve  10/08/19  -SC     STG 1  Patient independent and compliant with initial HEP focused on scapular stabilization for improved mobility and decreased pain  -SC     STG 1 Progress  Progressing  -SC     STG 1 Progress Comments  reinitiated cautiously today  -SC     STG 2  Patient with negative Flower-Hudson testing right shoulder for decreased impingement  -SC     STG 2 Progress  Progressing  -SC     STG 2 Progress Comments  had achieved, returns post operatively  -SC     STG 3  Patient able to return to Ballroom Dance with postural awareness without increase in symptoms and independent pain management techniques such as ice/exercise  -SC     STG 3 Progress  Progressing  -SC     STG 3 Progress Comments  instructed to return 50% with rests and proper hydration  -SC        Long Term Goals    LTG Date to Achieve  01/23/20  -SC     LTG 1  Patient independent and compliant with advanced HEP for transition to self-care  -SC     LTG 1 Progress  Ongoing  -SC     LTG 2  Patient with negative impingement right shoulder for improved mobility and optimal rehab with reconstruction  -SC     LTG 2 Progress  Ongoing  -SC     LTG 3   Patient score </=10/100 on DASH for improved function in home, community and exercise.   -SC     LTG 3 Progress  Progressing  -SC     LTG 3 Progress Comments  currently 11% on Quick Dash  -SC        Time Calculation    PT Goal Re-Cert Due Date  01/23/20  -SC       User Key  (r) = Recorded By, (t) = Taken By, (c) = Cosigned By    Initials Name Provider Type    SC Rosemary Brewster, PT Physical Therapist          PT Assessment/Plan     Row Name 10/24/19 0937          PT Assessment    Functional Limitations  Limitation in home management;Limitations in community activities;Performance in leisure activities;Performance in sport activities  -SC     Impairments  Edema;Impaired flexibility;Impaired lymphatic circulation;Integumentary integrity;Joint mobility;Muscle strength;Pain;Poor body mechanics;Posture;Range of motion  -SC     Assessment Comments  Mrs. Lee returns after breast reconstruction due to right breast cancer, s/p right mastectomy with TE May 2019, 0/4 R ALN (+), no chemoradiation, on Tamoxifen, s/p R TE to implant and L breast reduction/lift for symmetry performed by Dr. Mathur 09/23/2019, drains removed within one week, returning to sport/activities with precautions, noted return of right shoulder impingement syndrome with persisting poor posturing. Reconstruction is healing quite well. May benefit from bra referral pending progress and fit of OTS bra. Reinitiated gentle theraband exercises and education of ice. Education of return to sport/exercise gradually. To return to plastics in Dec 2019. To return to me to reassess in 3 weeks unless otherwise indicated. Highly encouraged to return before that time if shoulder or implant pain persists or increases.   -SC     Please refer to paper survey for additional self-reported information  Yes  -SC     Rehab Potential  Good  -SC     Patient/caregiver participated in establishment of treatment plan and goals  Yes  -SC     Patient would benefit from skilled therapy  intervention  Yes  -SC        PT Plan    PT Frequency  Other (comment)  -SC     Predicted Duration of Therapy Intervention (Therapy Eval)  return in 3 weeks to reassess and determine need for formal care, to contact sooner if right shoulder or right implant pain progresses  -SC     PT Plan Comments  reassess with updated HEP, release if indicated, referral bra fitting Alanna  -SC       User Key  (r) = Recorded By, (t) = Taken By, (c) = Cosigned By    Initials Name Provider Type    SC Rosemary Brewster, PT Physical Therapist            OP Exercises     Row Name 10/24/19 0937             Subjective Comments    Subjective Comments  My reconstruction went ok. 09/23/2019 Dr. Mathur. TE to implant right, reduction lift left. I was way more sore on the right than the left which was unexpected. He did a lot of work on the right he said to get rid of scar tissue. I had the drains removed within the week. I started back gentle to yoga about 2 weeks ago with no weight bearing. Started back weight bearing this week. Going ok. The back of my shoulder is sore. But he told me no repetitive exercises up to 6 weeks. I go back to him early Dec. I just want to get back into my exercise routine and I think I wasn't expecting it to take this long. I have one of the loose bras on (true target, good fit). I did buy one without underwire that I will start after I see him in Dec. I am just frustrated right now. Like I didn't get enough info. My range of motion is pretty good though. Before the reconstruction I do feel like my right shoulder was back to 100%, now hurting again. Going to a dance this weekend. Usually dance for 4 hours. I will try to only dance for 2 or so. If it hurts I will stop.   -SC         Subjective Pain    Able to rate subjective pain?  yes  -SC      Pre-Treatment Pain Level  4  -SC      Subjective Pain Comment  faces  -SC         Exercise 1    Exercise Name 1  lat pull down standing TB  -SC      Sets 1  2  -SC       Reps 1  10  -SC      Additional Comments  GTB  -SC         Exercise 2    Exercise Name 2  shoulder row standing  -SC      Sets 2  2  -SC      Reps 2  10  -SC      Additional Comments  GTB  -SC         Exercise 3    Exercise Name 3  shoulder ext B standing  -SC      Sets 3  2  -SC      Reps 3  10  -SC         Exercise 4    Exercise Name 4  shoulder ER TB B standing  -SC      Sets 4  2  -SC      Reps 4  10  -SC      Additional Comments  GTB  -SC         Exercise 5    Exercise Name 5  scapular retraction elbows straight/elbows bent  -SC      Sets 5  2  -SC      Reps 5  10  -SC      Additional Comments  reviewed  -SC         Exercise 6    Exercise Name 6  reverse shld rolls  -SC      Sets 6  2  -SC      Reps 6  10  -SC      Additional Comments  reviewed  -SC         Exercise 7    Exercise Name 7  shoulder add elias  -SC      Reps 7  10  -SC      Time 7  5 seconds  -SC      Additional Comments  reviewed  -SC        User Key  (r) = Recorded By, (t) = Taken By, (c) = Cosigned By    Initials Name Provider Type    SC Rosemary Brewster, PT Physical Therapist           Manual Rx (last 36 hours)      Manual Treatments     Row Name 10/24/19 0937             Manual Rx 1    Manual Rx 1 Location  right shoulder, patient supine with HOB elevated 1 pillow and knees bent, HL  -SC      Manual Rx 1 Type  AP and inferior joint mobs right shoulder, STM/CFM along RTC tendon insertion, inferior oscillations, PROM all planes  -SC      Manual Rx 1 Grade  II-III  -SC        User Key  (r) = Recorded By, (t) = Taken By, (c) = Cosigned By    Initials Name Provider Type    SC Rosemary Brewster, PT Physical Therapist                      Outcome Measure Options: Quick DASH  Quick DASH  Open a tight or new jar.: Mild Difficulty  Do heavy household chores (e.g., wash walls, wash floors): Mild Difficulty  Carry a shopping bag or briefcase: No Difficulty  Wash your back: No Difficulty  Use a knife to cut food: No Difficulty  Recreational activities  in which you take some force or impact through your arm, should or hand (e.g. golf, hammering, tennis, etc.): Mild Difficulty  During the past week, to what extent has your arm, shoulder, or hand problem interfered with your normal social activites with family, friends, neighbors or groups?: Not at all  During the past week, were you limited in your work or other regular daily activities as a result of your arm, shoulder or hand problem?: Slightly Limited  Arm, Shoulder, or hand pain: Mild  Tingling (pins and needles) in your arm, shoulder, or hand: None  During the past week, how much difficulty have you had sleeping because of the pain in your arm, shoulder or hand?: No difficulty  Number of Questions Answered: 11  Quick DASH Score: 11.36         Time Calculation:     Start Time: 0937  Stop Time: 1014  Time Calculation (min): 37 min     Therapy Charges for Today     Code Description Service Date Service Provider Modifiers Qty    46116231697 HC PT THER PROC EA 15 MIN 10/24/2019 Rosemary Brewster, PT GP 1    24481495783 HC PT MANUAL THERAPY EA 15 MIN 10/24/2019 Rosemary Brewster, PT GP 1    53306105646 HC PT RE-EVAL ESTABLISHED PLAN 2 10/24/2019 Rosemary Brewster, PT GP 1          PT G-Codes  Outcome Measure Options: Quick DASH  Quick DASH Score: 11.36         Rosemary Ricks-Ney, SANTOSH  10/24/2019

## 2019-11-12 ENCOUNTER — DOCUMENTATION (OUTPATIENT)
Dept: ONCOLOGY | Facility: HOSPITAL | Age: 69
End: 2019-11-12

## 2019-11-12 NOTE — PROGRESS NOTES
PT HAD SENT EMAIL STATING THAT TAMOXIFEN WAS CAUSING HER GI UPSET. SHE STOPPED MED AND THINGS GOT BETTER. PER LAST DICTATION PT COULD TRY 10MG DOSE AND SEE IF SHE TOLERATES BETTER. PER SC NP HAVE PT HOLD TAMOXIFEN A COUPLE DAYS AND LET GI ISSUES GET BETTER AND THEN PT START TAMOXIFEN AT 10MG DAILY. PT ADVISED TO DO SO AND LET US KNOW HOW THAT WORKS FOR HER. DR. ANTONIO MESSAGED TO MAKE HER AWARE AND MAKE SURE THAT WAS OK.

## 2019-11-14 ENCOUNTER — DOCUMENTATION (OUTPATIENT)
Dept: ONCOLOGY | Facility: HOSPITAL | Age: 69
End: 2019-11-14

## 2019-11-14 NOTE — PROGRESS NOTES
F/U TO PREVIOUS RN NOTE ABT TAMOXIFEN 10MG. PER DR. PACO BRUCE FOR PT TO TAKE 10MG TAMOXIFEN AND SEE IF SHE DOESN'T TOLERATE THAT DOSE BETTER.

## 2019-11-18 ENCOUNTER — HOSPITAL ENCOUNTER (OUTPATIENT)
Dept: PHYSICAL THERAPY | Facility: HOSPITAL | Age: 69
Setting detail: THERAPIES SERIES
Discharge: HOME | End: 2019-11-18

## 2019-11-18 DIAGNOSIS — M75.41 IMPINGEMENT SYNDROME OF RIGHT SHOULDER: ICD-10-CM

## 2019-11-18 DIAGNOSIS — D05.11 DUCTAL CARCINOMA IN SITU (DCIS) OF RIGHT BREAST: Primary | ICD-10-CM

## 2019-11-18 DIAGNOSIS — Z90.11 S/P RIGHT MASTECTOMY: ICD-10-CM

## 2019-11-18 DIAGNOSIS — M25.511 CHRONIC RIGHT SHOULDER PAIN: ICD-10-CM

## 2019-11-18 DIAGNOSIS — M25.611 DECREASED RANGE OF MOTION OF RIGHT SHOULDER: ICD-10-CM

## 2019-11-18 DIAGNOSIS — R29.3 POOR POSTURE: ICD-10-CM

## 2019-11-18 DIAGNOSIS — Z42.1 AFTERCARE POSTMASTECTOMY FOR BREAST RECONSTRUCTION: ICD-10-CM

## 2019-11-18 DIAGNOSIS — G89.29 CHRONIC RIGHT SHOULDER PAIN: ICD-10-CM

## 2019-11-18 PROCEDURE — 97110 THERAPEUTIC EXERCISES: CPT | Performed by: PHYSICAL THERAPIST

## 2019-11-18 PROCEDURE — 97164 PT RE-EVAL EST PLAN CARE: CPT | Performed by: PHYSICAL THERAPIST

## 2019-11-18 NOTE — THERAPY DISCHARGE NOTE
Outpatient Physical Therapy Ortho Progress Note/Discharge Summary  [facility]     Patient Name: Gayatri Lee  : 1950  MRN: [id number]  Today's Date: 2019      Visit Date: 2019    Visit Dx:    ICD-10-CM ICD-9-CM   1. Ductal carcinoma in situ (DCIS) of right breast D05.11 233.0   2. S/P right mastectomy Z90.11 V45.71   3. Aftercare postmastectomy for breast reconstruction Z42.1 V51.0   4. Decreased range of motion of right shoulder M25.611 719.51   5. Chronic right shoulder pain M25.511 719.41    G89.29 338.29   6. Poor posture R29.3 781.92   7. Impingement syndrome of right shoulder M75.41 726.2       Patient Active Problem List   Diagnosis   • Right foot pain   • Arthritis of big toe   • Acquired hallux rigidus of right foot   • Osteopenia   • History of colon polyps   • Recurrent sinusitis   • Menopausal symptoms   • Prediabetes   • Hyperlipidemia   • Cellulitis of right breast   • Mass of breast, right   • Ductal carcinoma in situ (DCIS) of right breast        Past Medical History:   Diagnosis Date   • Arthritis    • Basal cell carcinoma     followed by Dermatology   • Chronic gastritis    • DCIS (ductal carcinoma in situ)     followed by Dr. Denny (RIGHT)   • Dry eye    • Elevated HDL    • History of colon polyps     followed by GI, Dr. Cuba   • HSV-2 seropositive    • Melanoma (CMS/HCC)    • Osteopenia     followed by Gynecology, Dr. Tequila Boyle   • Pelvic prolapse    • Recurrent sinusitis    • Slow to wake up after anesthesia    • Tinnitus         Past Surgical History:   Procedure Laterality Date   • ABDOMINOPLASTY     • ADENOIDECTOMY     • BREAST RECONSTRUCTION Right 5/15/2019    Procedure: RIGHT PLACEMENT OF TISSUE EXPANDER WITH ALLODERM;  Surgeon: ROCK Mathur MD;  Location: [facility] OR;  Service: Plastics   • BREAST SURGERY Left     LUMPECTOMY   • BREAST SURGERY Left     LUMPECTOMY   • BREAST TISSUE EXPANDER REMOVAL INSERTION OF IMPLANT Bilateral  9/23/2019    Procedure: RIGHT BREAST TISSUE EXPANDER REMOVAL INSERTION OF IMPLANT LEFT REDUCTION OF SYMMETRY AND REMOVAL OF LEFT NIPPLE;  Surgeon: ROCK Mathur MD;  Location: [facility] MAIN OR;  Service: Plastics   • BROW LIFT AND BLEPHAROPLASTY  2004   • COLONOSCOPY  06/03/2013    TUBULAR ADENOMAS, TORT COLON,  DR. GOMEZ   • COLONOSCOPY W/ POLYPECTOMY     • CYSTOCELE REPAIR  05/2017   • DILATATION AND CURETTAGE     • ENDOSCOPY     • EYE SURGERY     • HAND SURGERY  1990   • HYSTERECTOMY  2017   • LAPAROTOMY OOPHERECTOMY  1994   • LARYNGOSCOPY      IRRITATION   DR. CORREA   • MASTECTOMY W/ SENTINEL NODE BIOPSY Right 5/15/2019    Procedure: RIGHT BREAST MASTECTOMY WITH SENTINEL NODE BIOPSY;  Surgeon: Daniel Denny MD;  Location: [facility] OR;  Service: General   • OOPHORECTOMY     • TONSILLECTOMY  1955   • TUBAL ABDOMINAL LIGATION  1980   • WISDOM TOOTH EXTRACTION  1970       PT Ortho     Row Name 11/18/19 8067       Subjective Comments    Subjective Comments  Doing quite well. I go back to Dr. Mathur next Monday 11/25/2019. I am more pleased now. I am more patient. I have returned to yoga and pilates and dance. 0 shoulder pain. Just tightness in my pec and the side of the implant. Otherwise, doing well.   -SC       Subjective Pain    Able to rate subjective pain?  yes  -SC    Pre-Treatment Pain Level  0  -SC       Posture/Observations    Posture- WNL  Posture is WNL  -SC       Shoulder Impingement/Rotator Cuff Special Tests    Flower-Hudson Test (RC Lesion vs. Bursitis)  Right:;Negative  -SC    Neer Impingement Test (RC Lesion vs. Bursitis)  Right:;Negative  -SC    Full Can Test (RC Lesion)  Right:;Negative  -SC    Empty Can Test (RC Lesion)  Right:;Negative  -SC    Drop Arm Test (Full Thickness RC Lesion)  Right:;Negative  -SC    Internal Impingement Sign  Right:;Negative  -SC    Lift-Off Test (Subscapularis Lesion)  Right:;Negative  -SC       General ROM    GENERAL ROM COMMENTS  B UEs WNLs  -SC        MMT (Manual Muscle Testing)    General MMT Comments  B UEs WNLs  -SC       Gait/Stairs Assessment/Training    Comment (Gait/Stairs)  normal  -SC      User Key  (r) = Recorded By, (t) = Taken By, (c) = Cosigned By    Initials Name Provider Type    Rosemary Fry, PT Physical Therapist              Lymphedema     Row Name 11/18/19 1331             Manual Lymphatic Drainage    Manual Therapy  reviewed self implant mobilizations  -SC         Compression/Skin Care    Compression/Skin Care Comments  noted proper fit of bra  -SC        User Key  (r) = Recorded By, (t) = Taken By, (c) = Cosigned By    Initials Name Provider Type    Rosemary Fry, PT Physical Therapist              PT Assessment/Plan     Row Name 11/18/19 4618          PT Assessment    Assessment Comments  complete resolution of shoulder symptoms. healing well post implant reconstruction. achieved all goals. d/c to self-care. encouraged to contact me in the future with any questions, concerns, or changes in symptoms.   -SC        PT Plan    PT Plan Comments  d/c  -SC       User Key  (r) = Recorded By, (t) = Taken By, (c) = Cosigned By    Initials Name Provider Type    Rosemary Fry, PT Physical Therapist              OP Exercises     Row Name 11/18/19 3461             Subjective Comments    Subjective Comments  Doing quite well. I go back to Dr. Mathur next Monday 11/25/2019. I am more pleased now. I am more patient. I have returned to yoga and pilates and dance. 0 shoulder pain. Just tightness in my pec and the side of the implant. Otherwise, doing well.   -SC         Subjective Pain    Able to rate subjective pain?  yes  -SC      Pre-Treatment Pain Level  0  -SC        User Key  (r) = Recorded By, (t) = Taken By, (c) = Cosigned By    Initials Name Provider Type    Rosemary Fry, PT Physical Therapist                         PT OP Goals     Row Name 11/18/19 6887          PT Short Term Goals    STG Date to Achieve  --  -SC      STG 1  Patient independent and compliant with initial HEP focused on scapular stabilization for improved mobility and decreased pain  -SC     STG 1 Progress  Met  -SC     STG 2  Patient with negative Flower-Hudson testing right shoulder for decreased impingement  -SC     STG 2 Progress  Met  -SC     STG 3  Patient able to return to Ballroom Dance with postural awareness without increase in symptoms and independent pain management techniques such as ice/exercise  -SC     STG 3 Progress  Met  -SC        Long Term Goals    LTG Date to Achieve  --  -SC     LTG 1  Patient independent and compliant with advanced HEP for transition to self-care  -SC     LTG 1 Progress  Met  -SC     LTG 2  Patient with negative impingement right shoulder for improved mobility and optimal rehab with reconstruction  -SC     LTG 2 Progress  Met  -SC     LTG 3  Patient score </=10/100 on DASH for improved function in home, community and exercise.   -SC     LTG 3 Progress  Met  -SC     LTG 3 Progress Comments  currently 4.5% on Quick DASH  -SC       User Key  (r) = Recorded By, (t) = Taken By, (c) = Cosigned By    Initials Name Provider Type    Rosemary Fry, PT Physical Therapist          Therapy Education  Education Details: implant care, reconstruction options, bra fitting, reasons to return if indicated  Given: HEP, Symptoms/condition management, Other (comment)  Program: Reinforced  How Provided: Verbal  Provided to: Patient  Level of Understanding: Verbalized    Outcome Measure Options: Quick DASH  Quick DASH  Open a tight or new jar.: No Difficulty  Do heavy household chores (e.g., wash walls, wash floors): No Difficulty  Carry a shopping bag or briefcase: No Difficulty  Wash your back: No Difficulty  Use a knife to cut food: No Difficulty  Recreational activities in which you take some force or impact through your arm, should or hand (e.g. golf, hammering, tennis, etc.): No Difficulty  During the past week, to what extent  has your arm, shoulder, or hand problem interfered with your normal social activites with family, friends, neighbors or groups?: Not at all  During the past week, were you limited in your work or other regular daily activities as a result of your arm, shoulder or hand problem?: Not limited at all  Arm, Shoulder, or hand pain: Mild  Tingling (pins and needles) in your arm, shoulder, or hand: Mild  During the past week, how much difficulty have you had sleeping because of the pain in your arm, shoulder or hand?: No difficulty  Number of Questions Answered: 11  Quick DASH Score: 4.55         Time Calculation:   Start Time: 1330  Stop Time: 1346  Time Calculation (min): 16 min  Therapy Charges for Today     Code Description Service Date Service Provider Modifiers Qty    12120320716 HC PT THER PROC EA 15 MIN 11/18/2019 Rosemary Brewster, PT GP 1    [id number]  PT RE-EVAL ESTABLISHED PLAN 2 11/18/2019 Rosemary Brewster, PT GP 1          PT G-Codes  Outcome Measure Options: Quick DASH  Quick DASH Score: 4.55     OP PT Discharge Summary  Date of Discharge: 11/18/19  Reason for Discharge: All goals achieved, Maximum functional potential achieved, Independent  Outcomes Achieved: Able to achieve all goals within established timeline  Discharge Destination: Home with home program, Outpatient services  Discharge Instructions/Additional Comments: to continue with plastics, contact me in the future with any questions, concerns, or changes in symptoms.       Rosemary Guzman, PT  11/18/2019

## 2020-04-02 ENCOUNTER — TELEPHONE (OUTPATIENT)
Dept: ONCOLOGY | Facility: CLINIC | Age: 70
End: 2020-04-02

## 2020-04-02 NOTE — TELEPHONE ENCOUNTER
Spoke with patient re the video appt and had her downloaded the helen if she has any questions or issues she will call me back

## 2020-04-02 NOTE — TELEPHONE ENCOUNTER
----- Message from Tamera Cardoso RN sent at 4/1/2020  8:12 AM EDT -----  Pt has video visit set up with Dr. Lund on 4/9. Colt is active just needs walked through the process. Thanks!  ----- Message -----  From: Daly Paige  Sent: 3/31/2020   2:51 PM EDT  To: Tamera Cardoso RN     ACTIVE COLT  ----- Message -----  From: Yamini Logan  Sent: 3/31/2020   2:05 PM EDT  To: Daly Paige    Video apt

## 2020-04-03 ENCOUNTER — TELEMEDICINE (OUTPATIENT)
Dept: ONCOLOGY | Facility: CLINIC | Age: 70
End: 2020-04-03

## 2020-04-03 DIAGNOSIS — D05.11 DUCTAL CARCINOMA IN SITU (DCIS) OF RIGHT BREAST: Primary | ICD-10-CM

## 2020-04-03 PROCEDURE — 99213 OFFICE O/P EST LOW 20 MIN: CPT | Performed by: INTERNAL MEDICINE

## 2020-04-03 NOTE — PROGRESS NOTES
Subjective     REASON FOR CONSULTATION:   1.Extensive DCIS right breast not mammographically detected low-grade ER positive SC negative post mastectomy and sentinel node biopsy Tis Nx  2.  Chemoprevention with tamoxifen initiated 7/19                             REQUESTING PHYSICIAN: MD Leanna Sharp MD    History of Present Illness patient is a 69-year-old lady with extensive DCIS postmastectomy on chemoprevention with tamoxifen intermittently for    3 months.  She started for a while and then had to stop for surgery and resumed treatment about 3 weeks ago and overall her biggest complaint is recurrent yeast infections and vaginal infections.      She called the office back in November of last year stating that she had more problems with vaginal infections and yeast infections and discontinued tamoxifen we recommended cut back to 10 mg but she did not do this and has been off treatment now since November    She is doing a video visit with me today because of the coronavirus epidemic and still reports that her immune system has not recovered from her surgery.  She has had 2 outbreaks of genital herpes and has had vaginal infection and a UTI since I saw her last despite being off the tamoxifen    She is scheduled for mammogram in January but delayed till March and now because of the coronavirus epidemic this is also been pushed off and I suggested she try again in August to have her mammogram done      We discussed again the data regarding the use of tamoxifen 5 mg which also had a beneficial effect for prevention but I recommended she will be 6 months to come back and see me and make a decision at that time.    Past Medical History:   Diagnosis Date   • Arthritis    • Basal cell carcinoma     followed by Dermatology   • Chronic gastritis    • DCIS (ductal carcinoma in situ)     followed by Dr. Denny (RIGHT)   • Dry eye    •  Elevated HDL    • History of colon polyps     followed by GI, Dr. Cuba   • HSV-2 seropositive    • Melanoma (CMS/HCC)    • Osteopenia     followed by Gynecology, Dr. Tequila Boyle   • Pelvic prolapse    • Recurrent sinusitis    • Slow to wake up after anesthesia    • Tinnitus         Past Surgical History:   Procedure Laterality Date   • ABDOMINOPLASTY  2004   • ADENOIDECTOMY  1955   • BREAST RECONSTRUCTION Right 5/15/2019    Procedure: RIGHT PLACEMENT OF TISSUE EXPANDER WITH ALLODERM;  Surgeon: ROCK Mathur MD;  Location: [facility]  Service: Plastics   • BREAST SURGERY Left 1976    LUMPECTOMY   • BREAST SURGERY Left 2004    LUMPECTOMY   • BREAST TISSUE EXPANDER REMOVAL INSERTION OF IMPLANT Bilateral 9/23/2019    Procedure: RIGHT BREAST TISSUE EXPANDER REMOVAL INSERTION OF IMPLANT LEFT REDUCTION OF SYMMETRY AND REMOVAL OF LEFT NIPPLE;  Surgeon: ROCK Mathur MD;  Location: [facility]  Service: Plastics   • BROW LIFT AND BLEPHAROPLASTY  2004   • COLONOSCOPY  06/03/2013    TUBULAR ADENOMAS, TORT COLON,  DR. CUBA   • COLONOSCOPY W/ POLYPECTOMY     • CYSTOCELE REPAIR  05/2017   • DILATATION AND CURETTAGE     • ENDOSCOPY     • EYE SURGERY     • HAND SURGERY  1990   • HYSTERECTOMY  2017   • LAPAROTOMY OOPHERECTOMY  1994   • LARYNGOSCOPY      IRRITATION   DR. CORREA   • MASTECTOMY W/ SENTINEL NODE BIOPSY Right 5/15/2019    Procedure: RIGHT BREAST MASTECTOMY WITH SENTINEL NODE BIOPSY;  Surgeon: Daniel Denny MD;  Location: [facility]  Service: General   • OOPHORECTOMY     • TONSILLECTOMY  1955   • TUBAL ABDOMINAL LIGATION  1980   • WISDOM TOOTH EXTRACTION  1970    ONC HISTORY  patient is a 69-year-old female with no chronic medical illnesses but significant problems over the years with fibrocystic breast disease requiring cyst aspirations and a previous biopsy of her left breast in 1976 and then again in 2004 when she had a lumpectomy on the left.  She was admitted to the hospital with  cellulitis in the right breast and a CAT scan of the chest was done which showed significant enlargement of the right breast compared to the left worrisome for underlying pathology.  She had a mammogram on 2018 which was not suspicious.Patient underwent ultrasound-guided biopsy on 3/14/2019 which showed low-grade DCIS ER 25% OR negative.  Patient then had bilateral breast MRIs on 2019 and this showed fairly extensive enhancement in the right breast suspicious for extensive DCIS with no abnormality in the left breast detected.  Based on these findings the patient opted to have a mastectomy with immediate reconstruction which was done on 5/15/2019,\pathology revealed a 10 x 9 cm area of low-grade DCIS with clear margins there is micropapillary and papillary subtypes with low to intermediate nuclear grade with no necrosis.  4 sentinel nodes were negative for metastatic disease    Postoperatively she has had a lot of pain from her implant but is not been good about taking her pain medicines and is suffering because of this.  She is  3 para 3 menarche was at age 14 menopause 15 years ago.  She has since had a hysterectomy and one ovary removed at the time of cystocele and rectocele repair in 2017.  First childbirth was at age 26.  Patient took Vagifem for urinary problems related to vaginal atrophy since  and for the last 2 years has been on progesterone replacement and then for the last year and estradiol patch all of which she discontinued when her DCIS was diagnosed 3 months ago    There is no family history of breast or ovarian cancer in her family mother had scleroderma.     explained to Gayatri that at this point-with the multiple previous biopsies in the left breast and the extensive DCIS in the right breast she would be at a significant risk of DCIS or invasive cancer in the left breast.  I suspect she would do better with tamoxifen then with an aromatase inhibitor because of her vaginal and  urinary issues for which she was taking vaginal estrogens.  I suggested she try the 20 mg dose and if she has trouble tolerating this because of urinary complaints we could either add back a low-dose vaginal estrogens which probably would be blocked by the tamoxifen or consider decreasing her tamoxifen dose to 10 mg which may also be as efficacious for prevention    Obviously if she she has poor tolerance no treatment would be given I also recommended that she would get MRIs for the remaining breast because of the inability ofStandard imaging to detect her disease    At this point she is planning to start her tamoxifen in about a month and then we will see her back after her permanent implants are placed in September to assess her tolerance  She will call if her urinary symptoms worsen on the tamoxifen to discuss how best to deal with this        Current Outpatient Medications on File Prior to Visit   Medication Sig Dispense Refill   • CALCIUM LACTATE PO Take 350 mg by mouth Daily.     • cholecalciferol (VITAMIN D3) 1000 units tablet Take 500 Units by mouth Daily.     • EVENING PRIMROSE OIL PO Take 2 capsules by mouth Daily.     • FISH OIL-KRILL OIL PO Take  by mouth.     • magnesium oxide (MAG-OX) 400 tablet tablet Take 150 mg by mouth Daily.     • Menaquinone-7 (VITAMIN K2 PO) Take 200 mcg by mouth Daily.     • metroNIDAZOLE (METROGEL) 0.75 % vaginal gel      • NON FORMULARY 60 mg Daily. maqui berry extract 60mg daily for dry eye      • nystatin-triamcinolone (MYCOLOG) 684033-8.1 UNIT/GM-% ointment APPLY A THIN COAT TO VAGINAL OPENING BID  0   • Probiotic Product (PROBIOTIC ADVANCED PO) Take 1 tablet by mouth Daily.     • Selenium (SELENIMIN PO) Take 50 mcg by mouth.     • tamoxifen (NOLVADEX) 20 MG chemo tablet Take 20 mg by mouth Daily.     • TURMERIC PO Take 1 each by mouth Daily.       No current facility-administered medications on file prior to visit.         ALLERGIES:    Allergies   Allergen Reactions   •  Sulfa Antibiotics Rash        Social History     Socioeconomic History   • Marital status:      Spouse name: Not on file   • Number of children: 3   • Years of education: College   • Highest education level: Not on file   Occupational History   • Occupation: Homemaker     Employer: RETIRED   Tobacco Use   • Smoking status: Never Smoker   • Smokeless tobacco: Never Used   Substance and Sexual Activity   • Alcohol use: Yes     Frequency: 2-4 times a month     Drinks per session: 1 or 2     Binge frequency: Never     Comment: one drink a week or less   • Drug use: No   • Sexual activity: Defer   Social History Narrative    Lives at home in a place of her own.  Enjoys tap and ballroom dance.          Family History   Problem Relation Age of Onset   • Cancer Father         Skin   • Heart disease Father    • Stroke Father    • Cancer Sister         skin   • Macular degeneration Sister    • Arthritis Sister    • Alcohol abuse Mother    • Scleroderma Mother    • Malig Hyperthermia Neg Hx         Review of Systems   Constitutional: Positive for fatigue (10/18/19).   Gastrointestinal: Positive for constipation (10/18/19).   Genitourinary: Positive for frequency.   Musculoskeletal: Negative for arthralgias and joint swelling.   Psychiatric/Behavioral: Positive for sleep disturbance (10/18/19).        Objective     There were no vitals filed for this visit.  Video visit  Current Status 10/18/2019   ECOG score 0     Not done because of video visit  Physical Exam   Pulmonary/Chest:           GENERAL:  Well-developed, well-nourished in no acute distress.   SKIN:  Warm, dry without rashes, purpura or petechiae.  EYES:  Pupils equal, round and reactive to light.  EOMs intact.  Conjunctivae normal.  EARS:  Hearing intact.  NOSE:  Septum midline.  No excoriations or nasal discharge.  MOUTH:  Tongue is well-papillated; no stomatitis or ulcers.  Lips normal.  THROAT:  Oropharynx without lesions or exudates.  NECK:  Supple with  good range of motion; no thyromegaly or masses, no JVD.  LYMPHATICS:  No cervical, supraclavicular, axillary or inguinal adenopathy.  CHEST:  Lungs clear to auscultation. Good airflow.  BREASTS: Right breast shows an implant in place with well-healed incisions the left breast shows a lift and removal of the nipple areolar  complex  CARDIAC:  Regular rate and rhythm without murmurs, rubs or gallops. Normal S1,S2.  ABDOMEN:  Soft, nontender with no hepatosplenomegaly or masses.  EXTREMITIES:  No clubbing, cyanosis or edema.  NEUROLOGICAL:  Cranial Nerves II-XII grossly intact.  No focal neurological deficits.  PSYCHIATRIC:  Normal affect and mood.        RECENT LABS:  Hematology WBC   Date Value Ref Range Status   02/03/2020 3.69 (L) 4.5 - 11.0 10*3/uL Final     RBC   Date Value Ref Range Status   02/03/2020 4.19 4.0 - 5.2 10*6/uL Final     Hemoglobin   Date Value Ref Range Status   02/03/2020 12.1 12.0 - 16.0 g/dL Final     Hematocrit   Date Value Ref Range Status   02/03/2020 38.9 36.0 - 46.0 % Final     Platelets   Date Value Ref Range Status   02/03/2020 239 140 - 440 10*3/uL Final        No labs today because of video visit          BILATERAL BREAST MRI WITHOUT AND WITH CONTRAST  IMPRESSION:  1. There is considerable abnormal enhancement occupying the entirety of  the upper inner and upper outer quadrants of the right breast spanning a  region measuring approximately 10.5 cm in greatest dimension. There may  be involvement of the right nipple as well. This is most consistent with  extensive in situ disease in the upper inner and upper outer quadrants  of the right breast. No evidence for right axillary adenopathy is  appreciated.  2. There are no findings suspicious for malignancy in the left breast.     BI-RADS Category 6: Known biopsy-proven malignancy.     This report was finalized on 4/15/2019     Tissue Pathology Exam: [id number]   Order: [id number]   Collected:  5/15/2019 13:39 Status:  Final result    Visible to patient:  Yes (MyChart) Dx:  Ductal carcinoma in situ (DCIS) of ri...   Component    Final Diagnosis   1. Breast, Right Lower Flap True Anterior Margin, Excision:               A. Benign breast parenchyma with clustered apocrine cysts, final margin free of neoplasm by an additional 5 mm.     2. Lymph Node, Right Exira #1, Excision:               A. Benign lymph node (0/1).                 3. Lymph Node, Right Exira #2, Excision:               A. Benign lymph node (0/1).     4. Exira Lymph Node, Right #3, Excision:               A. Benign lymph node (0/1).     5. Lymph Node, Right Exira #4, Excision:               A. Benign lymph node (0/1).     6. Breast, Right, Mastectomy (563 grams):               A. Ductal carcinoma in situ (DCIS), micropapillary and papillary subtypes with low to intermediate nuclear grade,                    no necrosis identified.  All margins are free of in situ carcinoma.               B. 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               C. Non-neoplastic breast parenchyma shows focal areas of adenosis and clustered cysts with apocrine                    metaplasia and microcalcifications identified in association with benign ductal structures.               D. See synoptic template below for complete tumor details.     [facility]/jse/pkm   Electronically signed by Lary De La Crzu MD for Sydni Bueno MD on 5/20/2019                      Assessment/Plan    1.  Low-grade DCIS extensive involvement of the right breast ER positive NC negative post mastectomy with immediate reconstruction  · Tried tamoxifen for the 4 months and then discontinued because of recurrent UTIs vaginal infections and sleeplessness  2.  Postoperative pain due to expander improved  3.  Urinary difficulty due to postmenopausal estrogen deprivation using vaginal estrogens sparingly   4.  Negative family history of breast or ovarian cancer    Plan  1.   Return in 6 months to see me to discuss 5 mg tamoxifen versus no treatment   2.  Can use low-dose vaginal estrogen to the urethral area for recurrent infections once a week  3.  Delay mammogram till August of this year and we will do an MRI next year for follow-up because of the dense breasts and the fact that the DCIS was not detected on mammography    Video visit 15 minutes

## 2020-04-06 ENCOUNTER — TELEPHONE (OUTPATIENT)
Dept: ONCOLOGY | Facility: CLINIC | Age: 70
End: 2020-04-06

## 2020-04-06 DIAGNOSIS — Z12.31 ENCOUNTER FOR SCREENING MAMMOGRAM FOR MALIGNANT NEOPLASM OF BREAST: ICD-10-CM

## 2020-04-06 DIAGNOSIS — D05.11 DUCTAL CARCINOMA IN SITU (DCIS) OF RIGHT BREAST: Primary | ICD-10-CM

## 2020-04-06 NOTE — TELEPHONE ENCOUNTER
Pt called stating she received a call from Lauren stating pt mammogram appt has been scheduled for August 7th.    Pt stated she will be on vacation that week.    Pt is requesting to reschedule mammogram for August 10th or later.    Call [phone] with rescheduled appt

## 2020-04-09 ENCOUNTER — APPOINTMENT (OUTPATIENT)
Dept: LAB | Facility: HOSPITAL | Age: 70
End: 2020-04-09

## 2020-04-27 ENCOUNTER — PREP FOR SURGERY (OUTPATIENT)
Dept: OTHER | Facility: HOSPITAL | Age: 70
End: 2020-04-27

## 2020-04-27 DIAGNOSIS — Z86.010 HX OF COLONIC POLYPS: Primary | ICD-10-CM

## 2020-06-10 PROBLEM — Z86.0100 HX OF COLONIC POLYPS: Status: ACTIVE | Noted: 2020-06-10

## 2020-06-10 PROBLEM — Z86.010 HX OF COLONIC POLYPS: Status: ACTIVE | Noted: 2020-06-10

## 2020-06-29 ENCOUNTER — TRANSCRIBE ORDERS (OUTPATIENT)
Dept: SLEEP MEDICINE | Facility: HOSPITAL | Age: 70
End: 2020-06-29

## 2020-06-29 DIAGNOSIS — Z01.818 OTHER SPECIFIED PRE-OPERATIVE EXAMINATION: Primary | ICD-10-CM

## 2020-07-04 ENCOUNTER — LAB (OUTPATIENT)
Dept: LAB | Facility: HOSPITAL | Age: 70
End: 2020-07-04

## 2020-07-04 DIAGNOSIS — Z01.818 OTHER SPECIFIED PRE-OPERATIVE EXAMINATION: ICD-10-CM

## 2020-07-04 PROCEDURE — U0004 COV-19 TEST NON-CDC HGH THRU: HCPCS

## 2020-07-04 PROCEDURE — C9803 HOPD COVID-19 SPEC COLLECT: HCPCS

## 2020-07-06 LAB
REF LAB TEST METHOD: NORMAL
SARS-COV-2 RNA RESP QL NAA+PROBE: NOT DETECTED

## 2020-07-07 ENCOUNTER — ANESTHESIA EVENT (OUTPATIENT)
Dept: GASTROENTEROLOGY | Facility: HOSPITAL | Age: 70
End: 2020-07-07

## 2020-07-07 ENCOUNTER — HOSPITAL ENCOUNTER (OUTPATIENT)
Facility: HOSPITAL | Age: 70
Setting detail: HOSPITAL OUTPATIENT SURGERY
Discharge: HOME | End: 2020-07-07
Attending: INTERNAL MEDICINE | Admitting: INTERNAL MEDICINE

## 2020-07-07 ENCOUNTER — ANESTHESIA (OUTPATIENT)
Dept: GASTROENTEROLOGY | Facility: HOSPITAL | Age: 70
End: 2020-07-07

## 2020-07-07 VITALS
RESPIRATION RATE: 16 BRPM | HEIGHT: 64 IN | TEMPERATURE: 98.6 F | BODY MASS INDEX: 19.97 KG/M2 | OXYGEN SATURATION: 100 % | SYSTOLIC BLOOD PRESSURE: 118 MMHG | WEIGHT: 117 LBS | DIASTOLIC BLOOD PRESSURE: 78 MMHG | HEART RATE: 62 BPM

## 2020-07-07 PROCEDURE — S0260 H&P FOR SURGERY: HCPCS | Performed by: INTERNAL MEDICINE

## 2020-07-07 PROCEDURE — 25010000002 PROPOFOL 10 MG/ML EMULSION: Performed by: NURSE ANESTHETIST, CERTIFIED REGISTERED

## 2020-07-07 PROCEDURE — G0105 COLORECTAL SCRN; HI RISK IND: HCPCS | Performed by: INTERNAL MEDICINE

## 2020-07-07 RX ORDER — PROPOFOL 10 MG/ML
VIAL (ML) INTRAVENOUS AS NEEDED
Status: DISCONTINUED | OUTPATIENT
Start: 2020-07-07 | End: 2020-07-07 | Stop reason: SURG

## 2020-07-07 RX ORDER — SODIUM CHLORIDE 0.9 % (FLUSH) 0.9 %
3 SYRINGE (ML) INJECTION EVERY 12 HOURS SCHEDULED
Status: DISCONTINUED | OUTPATIENT
Start: 2020-07-07 | End: 2020-07-07 | Stop reason: HOSPADM

## 2020-07-07 RX ORDER — SODIUM CHLORIDE 0.9 % (FLUSH) 0.9 %
10 SYRINGE (ML) INJECTION AS NEEDED
Status: DISCONTINUED | OUTPATIENT
Start: 2020-07-07 | End: 2020-07-07 | Stop reason: HOSPADM

## 2020-07-07 RX ORDER — LIDOCAINE HYDROCHLORIDE 20 MG/ML
INJECTION, SOLUTION INFILTRATION; PERINEURAL AS NEEDED
Status: DISCONTINUED | OUTPATIENT
Start: 2020-07-07 | End: 2020-07-07 | Stop reason: SURG

## 2020-07-07 RX ORDER — SODIUM CHLORIDE, SODIUM LACTATE, POTASSIUM CHLORIDE, CALCIUM CHLORIDE 600; 310; 30; 20 MG/100ML; MG/100ML; MG/100ML; MG/100ML
30 INJECTION, SOLUTION INTRAVENOUS CONTINUOUS PRN
Status: DISCONTINUED | OUTPATIENT
Start: 2020-07-07 | End: 2020-07-07 | Stop reason: HOSPADM

## 2020-07-07 RX ORDER — PROPOFOL 10 MG/ML
VIAL (ML) INTRAVENOUS CONTINUOUS PRN
Status: DISCONTINUED | OUTPATIENT
Start: 2020-07-07 | End: 2020-07-07 | Stop reason: SURG

## 2020-07-07 RX ADMIN — LIDOCAINE HYDROCHLORIDE 80 MG: 20 INJECTION, SOLUTION INFILTRATION; PERINEURAL at 14:05

## 2020-07-07 RX ADMIN — PROPOFOL 20 MG: 10 INJECTION, EMULSION INTRAVENOUS at 14:16

## 2020-07-07 RX ADMIN — PROPOFOL 75 MCG/KG/MIN: 10 INJECTION, EMULSION INTRAVENOUS at 14:05

## 2020-07-07 RX ADMIN — PROPOFOL 60 MG: 10 INJECTION, EMULSION INTRAVENOUS at 14:05

## 2020-07-07 RX ADMIN — SODIUM CHLORIDE, POTASSIUM CHLORIDE, SODIUM LACTATE AND CALCIUM CHLORIDE 30 ML/HR: 600; 310; 30; 20 INJECTION, SOLUTION INTRAVENOUS at 13:47

## 2020-07-07 NOTE — ANESTHESIA POSTPROCEDURE EVALUATION
"Patient: Gayatri Lee    Procedure Summary     Date:  07/07/20 Room / Location:   [facility]    Anesthesia Start:  1357 Anesthesia Stop:  1425    Procedure:  COLONOSCOPY to cecum (N/A ) Diagnosis:       Tortuous colon      Hemorrhoids      (Hx of colonic polyps [Z86.010])    Surgeon:  Felix Cuba MD Provider:  Benjy Colón MD    Anesthesia Type:  MAC ASA Status:  2          Anesthesia Type: MAC    Vitals  Vitals Value Taken Time   /65 7/7/2020  2:26 PM   Temp     Pulse 65 7/7/2020  2:26 PM   Resp 16 7/7/2020  2:26 PM   SpO2 98 % 7/7/2020  2:26 PM           Post Anesthesia Care and Evaluation    Patient location during evaluation: bedside  Patient participation: complete - patient participated  Level of consciousness: sleepy but conscious  Pain score: 0  Pain management: adequate  Airway patency: patent  Anesthetic complications: No anesthetic complications    Cardiovascular status: acceptable  Respiratory status: acceptable  Hydration status: acceptable    Comments: /65 (BP Location: Left arm, Patient Position: Lying)   Pulse 65   Temp 37 °C (98.6 °F) (Oral)   Resp 16   Ht 162.6 cm (64\")   Wt 53.1 kg (117 lb)   SpO2 98%   BMI 20.08 kg/m²         "

## 2020-07-07 NOTE — DISCHARGE INSTRUCTIONS
Dr. Cuba   [phone]    Colonoscopy, Adult, Care After    This sheet gives you information about how to care for yourself after your procedure. Your doctor may also give you more specific instructions. If you have problems or questions, call your doctor.  What can I expect after the procedure?  After the procedure, it is common to have:  · A small amount of blood in your poop for 24 hours.  · Some gas.  · Mild cramping or bloating in your belly.  Follow these instructions at home:  General instructions  · For the first 24 hours after the procedure:  ? Do not drive or use machinery.  ? Do not sign important documents.  ? Do not drink alcohol.  ? Do your daily activities more slowly than normal.  ? Eat foods that are soft and easy to digest.  · Take over-the-counter or prescription medicines only as told by your doctor.  To help cramping and bloating:    · Try walking around.  · Put heat on your belly (abdomen) as told by your doctor. Use a heat source that your doctor recommends, such as a moist heat pack or a heating pad.  ? Put a towel between your skin and the heat source.  ? Leave the heat on for 20-30 minutes.  ? Remove the heat if your skin turns bright red. This is especially important if you cannot feel pain, heat, or cold. You can get burned.  Eating and drinking    · Drink enough fluid to keep your pee (urine) clear or pale yellow.  · Return to your normal diet as told by your doctor. Avoid heavy or fried foods that are hard to digest.  · Avoid drinking alcohol for as long as told by your doctor.  Contact a doctor if:  · You have blood in your poop (stool) 2-3 days after the procedure.  Get help right away if:  · You have more than a small amount of blood in your poop.  · You see large clumps of tissue (blood clots) in your poop.  · Your belly is swollen.  · You feel sick to your stomach (nauseous).  · You throw up (vomit).  · You have a fever.  · You have belly pain that gets worse, and medicine does  not help your pain.  Summary  · After the procedure, it is common to have a small amount of blood in your poop. You may also have mild cramping and bloating in your belly.  · For the first 24 hours after the procedure, do not drive or use machinery, do not sign important documents, and do not drink alcohol.  · Get help right away if you have a lot of blood in your poop, feel sick to your stomach, have a fever, or have more belly pain.  This information is not intended to replace advice given to you by your health care provider. Make sure you discuss any questions you have with your health care provider.  Document Released: 01/20/2012 Document Revised: 10/18/2018 Document Reviewed: 09/11/2017  [email or web address] Patient Education © 2020 [employer]    Hemorrhoids  Hemorrhoids are swollen veins that may develop:  · In the butt (rectum). These are called internal hemorrhoids.  · Around the opening of the butt (anus). These are called external hemorrhoids.  Hemorrhoids can cause pain, itching, or bleeding. Most of the time, they do not cause serious problems. They usually get better with diet changes, lifestyle changes, and other home treatments.  What are the causes?  This condition may be caused by:  · Having trouble pooping (constipation).  · Pushing hard (straining) to poop.  · Watery poop (diarrhea).  · Pregnancy.  · Being very overweight (obese).  · Sitting for long periods of time.  · Heavy lifting or other activity that causes you to strain.  · Anal sex.  · Riding a bike for a long period of time.  What are the signs or symptoms?  Symptoms of this condition include:  · Pain.  · Itching or soreness in the butt.  · Bleeding from the butt.  · Leaking poop.  · Swelling in the area.  · One or more lumps around the opening of your butt.  How is this diagnosed?  A doctor can often diagnose this condition by looking at the affected area. The doctor may also:  · Do an exam that involves feeling the area with a gloved hand  (digital rectal exam).  · Examine the area inside your butt using a small tube (anoscope).  · Order blood tests. This may be done if you have lost a lot of blood.  · Have you get a test that involves looking inside the colon using a flexible tube with a camera on the end (sigmoidoscopy or colonoscopy).  How is this treated?  This condition can usually be treated at home. Your doctor may tell you to change what you eat, make lifestyle changes, or try home treatments. If these do not help, procedures can be done to remove the hemorrhoids or make them smaller. These may involve:  · Placing rubber bands at the base of the hemorrhoids to cut off their blood supply.  · Injecting medicine into the hemorrhoids to shrink them.  · Shining a type of light energy onto the hemorrhoids to cause them to fall off.  · Doing surgery to remove the hemorrhoids or cut off their blood supply.  Follow these instructions at home:  Eating and drinking    · Eat foods that have a lot of fiber in them. These include whole grains, beans, nuts, fruits, and vegetables.  · Ask your doctor about taking products that have added fiber (fibersupplements).  · Reduce the amount of fat in your diet. You can do this by:  ? Eating low-fat dairy products.  ? Eating less red meat.  ? Avoiding processed foods.  · Drink enough fluid to keep your pee (urine) pale yellow.  Managing pain and swelling    · Take a warm-water bath (sitz bath) for 20 minutes to ease pain. Do this 3-4 times a day. You may do this in a bathtub or using a portable sitz bath that fits over the toilet.  · If told, put ice on the painful area. It may be helpful to use ice between your warm baths.  ? Put ice in a plastic bag.  ? Place a towel between your skin and the bag.  ? Leave the ice on for 20 minutes, 2-3 times a day.  General instructions  · Take over-the-counter and prescription medicines only as told by your doctor.  ? Medicated creams and medicines may be used as  told.  · Exercise often. Ask your doctor how much and what kind of exercise is best for you.  · Go to the bathroom when you have the urge to poop. Do not wait.  · Avoid pushing too hard when you poop.  · Keep your butt dry and clean. Use wet toilet paper or moist towelettes after pooping.  · Do not sit on the toilet for a long time.  · Keep all follow-up visits as told by your doctor. This is important.  Contact a doctor if you:  · Have pain and swelling that do not get better with treatment or medicine.  · Have trouble pooping.  · Cannot poop.  · Have pain or swelling outside the area of the hemorrhoids.  Get help right away if you have:  · Bleeding that will not stop.  Summary  · Hemorrhoids are swollen veins in the butt or around the opening of the butt.  · They can cause pain, itching, or bleeding.  · Eat foods that have a lot of fiber in them. These include whole grains, beans, nuts, fruits, and vegetables.  · Take a warm-water bath (sitz bath) for 20 minutes to ease pain. Do this 3-4 times a day.  This information is not intended to replace advice given to you by your health care provider. Make sure you discuss any questions you have with your health care provider.  Document Released: 09/26/2009 Document Revised: 12/26/2019 Document Reviewed: 05/09/2019  [employer] Patient Education © 2020 [employer]

## 2020-07-07 NOTE — ANESTHESIA PREPROCEDURE EVALUATION
Anesthesia Evaluation     Patient summary reviewed and Nursing notes reviewed   history of anesthetic complications: prolonged sedation  NPO Solid Status: > 8 hours  NPO Liquid Status: > 4 hours           Airway   Mallampati: II  Neck ROM: full  No difficulty expected  Dental      Comment: Bridge upper right    Pulmonary     breath sounds clear to auscultation  Cardiovascular     Rhythm: regular    (+) hyperlipidemia,       Neuro/Psych  GI/Hepatic/Renal/Endo      Musculoskeletal     Abdominal    Substance History      OB/GYN          Other   arthritis,    history of cancer                    Anesthesia Plan    ASA 2     MAC     intravenous induction     Anesthetic plan, all risks, benefits, and alternatives have been provided, discussed and informed consent has been obtained with: patient.

## 2020-07-21 ENCOUNTER — TELEPHONE (OUTPATIENT)
Dept: ONCOLOGY | Facility: CLINIC | Age: 70
End: 2020-07-21

## 2020-08-10 ENCOUNTER — APPOINTMENT (OUTPATIENT)
Dept: WOMENS IMAGING | Facility: HOSPITAL | Age: 70
End: 2020-08-10

## 2020-08-10 PROCEDURE — 77067 SCR MAMMO BI INCL CAD: CPT | Performed by: RADIOLOGY

## 2020-08-10 PROCEDURE — 77063 BREAST TOMOSYNTHESIS BI: CPT | Performed by: RADIOLOGY

## 2020-11-06 ENCOUNTER — APPOINTMENT (OUTPATIENT)
Dept: LAB | Facility: HOSPITAL | Age: 70
End: 2020-11-06

## 2020-11-11 NOTE — PROGRESS NOTES
Subjective     REASON FOR CONSULTATION:   1.Extensive DCIS right breast not mammographically detected low-grade ER positive AL negative post mastectomy and sentinel node biopsy Tis Nx  2.  Chemoprevention with tamoxifen initiated 7/19-stopped due to toxicity after 4 months                             REQUESTING PHYSICIAN: MD Leanna Sharp MD    History of Present Illness patient is a 69-year-old lady with extensive DCIS postmastectomy on chemoprevention with tamoxifen intermittently for  3 months.  She started for a while and then had to stop for surgery and resumed treatment and overall her biggest complaint is recurrent yeast infections and vaginal infections.  She stopped in November 2019 and has been off since then    Her mammogram was in August and thankfully benign on the left    She has become a past Dinora and is altered her diet and feels very comfortable that she is doing the best she can to prevent another cancer    She has decided she does not want prevention which is not unreasonable and I suggested she continue with annual screening mammograms but we do not need to follow her at this time      Past Medical History:   Diagnosis Date   • Arthritis    • Basal cell carcinoma     followed by Dermatology   • Chronic gastritis    • DCIS (ductal carcinoma in situ)     followed by Dr. Denny (RIGHT)   • Dry eye    • Elevated HDL    • History of colon polyps     followed by GI, Dr. Cuba   • HSV-2 seropositive    • Melanoma (CMS/HCC)    • Osteopenia     followed by Gynecology, Dr. Tequila Boyle   • Pelvic prolapse    • Recurrent sinusitis    • Slow to wake up after anesthesia    • Tinnitus         Past Surgical History:   Procedure Laterality Date   • ABDOMINOPLASTY  2004   • ADENOIDECTOMY  1955   • BREAST RECONSTRUCTION Right 5/15/2019    Procedure: RIGHT PLACEMENT OF TISSUE EXPANDER WITH ALLODERM;  Surgeon: ROCK Mathur  MD Diego;  Location: [facility] OR;  Service: Plastics   • BREAST SURGERY Left 1976    LUMPECTOMY   • BREAST SURGERY Left 2004    LUMPECTOMY   • BREAST TISSUE EXPANDER REMOVAL INSERTION OF IMPLANT Bilateral 9/23/2019    Procedure: RIGHT BREAST TISSUE EXPANDER REMOVAL INSERTION OF IMPLANT LEFT REDUCTION OF SYMMETRY AND REMOVAL OF LEFT NIPPLE;  Surgeon: ROCK Mathur MD;  Location: [facility] OR;  Service: Plastics   • BROW LIFT AND BLEPHAROPLASTY  2004   • COLONOSCOPY  06/03/2013    TUBULAR ADENOMAS, TORT COLON,  DR. CUBA   • COLONOSCOPY N/A 7/7/2020    Procedure: COLONOSCOPY to cecum;  Surgeon: Felix Cuba MD;  Location: [facility] ENDOSCOPY;  Service: Gastroenterology;  Laterality: N/A;  PRE: Person History of Polyps  POST: Hemorrhoids, Tortuous Colon, Normal to Cecum   • COLONOSCOPY W/ POLYPECTOMY     • CYSTOCELE REPAIR  05/2017   • DILATATION AND CURETTAGE     • ENDOSCOPY     • EYE SURGERY     • HAND SURGERY  1990   • HYSTERECTOMY  2017   • LAPAROTOMY OOPHERECTOMY  1994   • LARYNGOSCOPY      IRRITATION   DR. CORREA   • MASTECTOMY W/ SENTINEL NODE BIOPSY Right 5/15/2019    Procedure: RIGHT BREAST MASTECTOMY WITH SENTINEL NODE BIOPSY;  Surgeon: Daniel Denny MD;  Location: [facility] OR;  Service: General   • OOPHORECTOMY     • TONSILLECTOMY  1955   • TUBAL ABDOMINAL LIGATION  1980   • WISDOM TOOTH EXTRACTION  1970    ONC HISTORY  patient is a 69-year-old female with no chronic medical illnesses but significant problems over the years with fibrocystic breast disease requiring cyst aspirations and a previous biopsy of her left breast in 1976 and then again in 2004 when she had a lumpectomy on the left.  She was admitted to the hospital with cellulitis in the right breast and a CAT scan of the chest was done which showed significant enlargement of the right breast compared to the left worrisome for underlying pathology.  She had a mammogram on 11/19/2018 which was not suspicious.Patient  underwent ultrasound-guided biopsy on 3/14/2019 which showed low-grade DCIS ER 25% IN negative.  Patient then had bilateral breast MRIs on 2019 and this showed fairly extensive enhancement in the right breast suspicious for extensive DCIS with no abnormality in the left breast detected.  Based on these findings the patient opted to have a mastectomy with immediate reconstruction which was done on 5/15/2019,\pathology revealed a 10 x 9 cm area of low-grade DCIS with clear margins there is micropapillary and papillary subtypes with low to intermediate nuclear grade with no necrosis.  4 sentinel nodes were negative for metastatic disease    Postoperatively she has had a lot of pain from her implant but is not been good about taking her pain medicines and is suffering because of this.  She is  3 para 3 menarche was at age 14 menopause 15 years ago.  She has since had a hysterectomy and one ovary removed at the time of cystocele and rectocele repair in 2017.  First childbirth was at age 26.  Patient took Vagifem for urinary problems related to vaginal atrophy since  and for the last 2 years has been on progesterone replacement and then for the last year and estradiol patch all of which she discontinued when her DCIS was diagnosed 3 months ago    There is no family history of breast or ovarian cancer in her family mother had scleroderma.     explained to Gayatri that at this point-with the multiple previous biopsies in the left breast and the extensive DCIS in the right breast she would be at a significant risk of DCIS or invasive cancer in the left breast.  I suspect she would do better with tamoxifen then with an aromatase inhibitor because of her vaginal and urinary issues for which she was taking vaginal estrogens.  I suggested she try the 20 mg dose and if she has trouble tolerating this because of urinary complaints we could either add back a low-dose vaginal estrogens which probably would be blocked  by the tamoxifen or consider decreasing her tamoxifen dose to 10 mg which may also be as efficacious for prevention    Obviously if she she has poor tolerance no treatment would be given I also recommended that she would get MRIs for the remaining breast because of the inability ofStandard imaging to detect her disease    At this point she is planning to start her tamoxifen in about a month and then we will see her back after her permanent implants are placed in September to assess her tolerance  She will call if her urinary symptoms worsen on the tamoxifen to discuss how best to deal with this    5/20  Stopped tamoxifen in November 2019 because of side effects    she is doing a video visit with me today because of the coronavirus epidemic and still reports that her immune system has not recovered from her surgery.  She has had 2 outbreaks of genital herpes and has had vaginal infection and a UTI since I saw her last despite being off the tamoxifen    She is scheduled for mammogram in January but delayed till March and now because of the coronavirus epidemic this is also been pushed off and I suggested she try again in August to have her mammogram done      We discussed again the data regarding the use of tamoxifen 5 mg which also had a beneficial effect for prevention but I recommended she will be 6 months to come back and see me and make a decision at that time.        Current Outpatient Medications on File Prior to Visit   Medication Sig Dispense Refill   • CALCIUM LACTATE PO Take 350 mg by mouth Daily.     • cholecalciferol (VITAMIN D3) 1000 units tablet Take 500 Units by mouth Daily.     • FISH OIL-KRILL OIL PO Take  by mouth.     • Fluzone High-Dose Quadrivalent 0.7 ML suspension prefilled syringe ADM 0.7ML IM UTD     • magnesium oxide (MAG-OX) 400 tablet tablet Take 150 mg by mouth Daily.     • Menaquinone-7 (VITAMIN K2 PO) Take 200 mcg by mouth Daily.     • Selenium (SELENIMIN PO) Take 50 mcg by mouth.     •  TURMERIC PO Take 1 each by mouth Daily.     • valACYclovir (VALTREX) 1000 MG tablet      • [DISCONTINUED] metroNIDAZOLE (METROGEL) 0.75 % vaginal gel      • [DISCONTINUED] nystatin-triamcinolone (MYCOLOG) 551775-6.1 UNIT/GM-% ointment APPLY A THIN COAT TO VAGINAL OPENING BID  0   • [DISCONTINUED] Probiotic Product (PROBIOTIC ADVANCED PO) Take 1 tablet by mouth Daily.     • [DISCONTINUED] valACYclovir (VALTREX) 500 MG tablet TK 1 T PO Q 12 H       No current facility-administered medications on file prior to visit.         ALLERGIES:    Allergies   Allergen Reactions   • Sulfa Antibiotics Rash        Social History     Socioeconomic History   • Marital status:      Spouse name: Not on file   • Number of children: 3   • Years of education: College   • Highest education level: Not on file   Occupational History   • Occupation: Homemaker     Employer: RETIRED   Tobacco Use   • Smoking status: Never Smoker   • Smokeless tobacco: Never Used   Substance and Sexual Activity   • Alcohol use: Yes     Frequency: 2-4 times a month     Drinks per session: 1 or 2     Binge frequency: Never     Comment: one drink a week or less   • Drug use: No   • Sexual activity: Defer   Social History Narrative    Lives at home in a place of her own.  Enjoys tap and ballroom dance.          Family History   Problem Relation Age of Onset   • Cancer Father         Skin   • Heart disease Father    • Stroke Father    • Cancer Sister         skin   • Macular degeneration Sister    • Arthritis Sister    • Alcohol abuse Mother    • Scleroderma Mother    • Malig Hyperthermia Neg Hx         Review of Systems   Musculoskeletal: Negative for arthralgias and joint swelling.   Psychiatric/Behavioral: Positive for sleep disturbance (intermittent). Negative for self-injury.   All other systems reviewed and are negative.  I have reviewed and confirmed the accuracy of the ROS as documented by the MA/LPN/RN Camden Lund MD         Objective  "    Vitals:    11/12/20 0934   BP: 101/70   Pulse: 64   Resp: 16   Temp: 98 °F (36.7 °C)   TempSrc: Skin   SpO2: 99%   Weight: 54.5 kg (120 lb 3.2 oz)   Height: 162.6 cm (64.02\")   PainSc: 0-No pain     Video visit  Current Status 11/12/2020   ECOG score 0     Not done because of video visit  Physical Exam   Pulmonary/Chest:           GENERAL:  Well-developed, well-nourished in no acute distress.   SKIN:  Warm, dry without rashes, purpura or petechiae.  EYES:  Pupils equal, round and reactive to light.  EOMs intact.  Conjunctivae normal.  EARS:  Hearing intact.  NOSE:  Septum midline.  No excoriations or nasal discharge.  MOUTH:  Tongue is well-papillated; no stomatitis or ulcers.  Lips normal.  THROAT:  Oropharynx without lesions or exudates.  NECK:  Supple with good range of motion; no thyromegaly or masses, no JVD.  LYMPHATICS:  No cervical, supraclavicular, axillary or inguinal adenopathy.  CHEST:  Lungs clear to auscultation. Good airflow.  BREASTS: Right breast shows an implant in place with well-healed incisions the left breast shows a lift and removal of the nipple areolar  Complex-there is significant fibrocystic nodularity in the upper outer quadrant of the left breast  CARDIAC:  Regular rate and rhythm without murmurs, rubs or gallops. Normal S1,S2.  ABDOMEN:  Soft, nontender with no hepatosplenomegaly or masses.  EXTREMITIES:  No clubbing, cyanosis or edema.  NEUROLOGICAL:  Cranial Nerves II-XII grossly intact.  No focal neurological deficits.  PSYCHIATRIC:  Normal affect and mood.    I have reexamined the patient and the results are consistent with the previously documented exam. Camden Lund MD       RECENT LABS:  Hematology WBC   Date Value Ref Range Status   11/12/2020 5.47 3.40 - 10.80 10*3/mm3 Final   02/03/2020 3.69 (L) 4.5 - 11.0 10*3/uL Final     RBC   Date Value Ref Range Status   11/12/2020 4.30 3.77 - 5.28 10*6/mm3 Final   02/03/2020 4.19 4.0 - 5.2 10*6/uL Final     Hemoglobin   Date " Value Ref Range Status   11/12/2020 12.4 12.0 - 15.9 g/dL Final   02/03/2020 12.1 12.0 - 16.0 g/dL Final     Hematocrit   Date Value Ref Range Status   11/12/2020 38.8 34.0 - 46.6 % Final   02/03/2020 38.9 36.0 - 46.0 % Final     Platelets   Date Value Ref Range Status   11/12/2020 249 140 - 450 10*3/mm3 Final   02/03/2020 239 140 - 440 10*3/uL Final        No labs today because of video visit          BILATERAL BREAST MRI WITHOUT AND WITH CONTRAST  IMPRESSION:  1. There is considerable abnormal enhancement occupying the entirety of  the upper inner and upper outer quadrants of the right breast spanning a  region measuring approximately 10.5 cm in greatest dimension. There may  be involvement of the right nipple as well. This is most consistent with  extensive in situ disease in the upper inner and upper outer quadrants  of the right breast. No evidence for right axillary adenopathy is  appreciated.  2. There are no findings suspicious for malignancy in the left breast.     BI-RADS Category 6: Known biopsy-proven malignancy.     This report was finalized on 4/15/2019     Tissue Pathology Exam: [id number]   Order: [id number]   Collected:  5/15/2019 13:39 Status:  Final result   Visible to patient:  Yes (MyChart) Dx:  Ductal carcinoma in situ (DCIS) of ri...   Component    Final Diagnosis   1. Breast, Right Lower Flap True Anterior Margin, Excision:               A. Benign breast parenchyma with clustered apocrine cysts, final margin free of neoplasm by an additional 5 mm.     2. Lymph Node, Right [place] #1, Excision:               A. Benign lymph node (0/1).                 3. Lymph Node, Right [place] #2, Excision:               A. Benign lymph node (0/1).     4. [place] Lymph Node, Right #3, Excision:               A. Benign lymph node (0/1).     5. Lymph Node, Right [place] #4, Excision:               A. Benign lymph node (0/1).     6. Breast, Right, Mastectomy (563 grams):               A. Ductal carcinoma in  situ (DCIS), micropapillary and papillary subtypes with low to intermediate nuclear grade,                    no necrosis identified.  All margins are free of in situ carcinoma.               B. 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               C. Non-neoplastic breast parenchyma shows focal areas of adenosis and clustered cysts with apocrine                    metaplasia and microcalcifications identified in association with benign ductal structures.               D. See synoptic template below for complete tumor details.     Mec/jse/pkm   Electronically signed by Lary De La Cruz MD for BuenoSydni MD on 5/20/2019                                Assessment/Plan    1.  Low-grade DCIS extensive involvement of the right breast ER positive ME negative post mastectomy with immediate reconstruction  · Tried tamoxifen for the 4 months and then discontinued because of recurrent UTIs vaginal infections and sleeplessness  2.  Postoperative pain due to expander improved  3.  Urinary difficulty due to postmenopausal estrogen deprivation using vaginal estrogens sparingly   4.  Negative family history of breast or ovarian cancer    Plan  1.  Patient has declined any prevention so I think we do not need to follow her at this time but would be happy to see her as needed

## 2020-11-12 ENCOUNTER — OFFICE VISIT (OUTPATIENT)
Dept: ONCOLOGY | Facility: CLINIC | Age: 70
End: 2020-11-12

## 2020-11-12 ENCOUNTER — LAB (OUTPATIENT)
Dept: LAB | Facility: HOSPITAL | Age: 70
End: 2020-11-12

## 2020-11-12 VITALS
SYSTOLIC BLOOD PRESSURE: 101 MMHG | RESPIRATION RATE: 16 BRPM | DIASTOLIC BLOOD PRESSURE: 70 MMHG | OXYGEN SATURATION: 99 % | HEART RATE: 64 BPM | BODY MASS INDEX: 20.52 KG/M2 | HEIGHT: 64 IN | WEIGHT: 120.2 LBS | TEMPERATURE: 98 F

## 2020-11-12 DIAGNOSIS — D05.11 DUCTAL CARCINOMA IN SITU (DCIS) OF RIGHT BREAST: Primary | ICD-10-CM

## 2020-11-12 LAB
ALBUMIN SERPL-MCNC: 4.4 G/DL (ref 3.5–5.2)
ALBUMIN/GLOB SERPL: 1.5 G/DL (ref 1.1–2.4)
ALP SERPL-CCNC: 71 U/L (ref 38–116)
ALT SERPL W P-5'-P-CCNC: 12 U/L (ref 0–33)
ANION GAP SERPL CALCULATED.3IONS-SCNC: 9.5 MMOL/L (ref 5–15)
AST SERPL-CCNC: 17 U/L (ref 0–32)
BASOPHILS # BLD AUTO: 0.12 10*3/MM3 (ref 0–0.2)
BASOPHILS NFR BLD AUTO: 2.2 % (ref 0–1.5)
BILIRUB SERPL-MCNC: 0.3 MG/DL (ref 0.2–1.2)
BUN SERPL-MCNC: 16 MG/DL (ref 6–20)
BUN/CREAT SERPL: 21.6 (ref 7.3–30)
CALCIUM SPEC-SCNC: 9.9 MG/DL (ref 8.5–10.2)
CHLORIDE SERPL-SCNC: 102 MMOL/L (ref 98–107)
CO2 SERPL-SCNC: 28.5 MMOL/L (ref 22–29)
CREAT SERPL-MCNC: 0.74 MG/DL (ref 0.6–1.1)
DEPRECATED RDW RBC AUTO: 41.7 FL (ref 37–54)
EOSINOPHIL # BLD AUTO: 0.44 10*3/MM3 (ref 0–0.4)
EOSINOPHIL NFR BLD AUTO: 8 % (ref 0.3–6.2)
ERYTHROCYTE [DISTWIDTH] IN BLOOD BY AUTOMATED COUNT: 12.7 % (ref 12.3–15.4)
GFR SERPL CREATININE-BSD FRML MDRD: 78 ML/MIN/1.73
GLOBULIN UR ELPH-MCNC: 2.9 GM/DL (ref 1.8–3.5)
GLUCOSE SERPL-MCNC: 72 MG/DL (ref 74–124)
HCT VFR BLD AUTO: 38.8 % (ref 34–46.6)
HGB BLD-MCNC: 12.4 G/DL (ref 12–15.9)
IMM GRANULOCYTES # BLD AUTO: 0.03 10*3/MM3 (ref 0–0.05)
IMM GRANULOCYTES NFR BLD AUTO: 0.5 % (ref 0–0.5)
LYMPHOCYTES # BLD AUTO: 1.87 10*3/MM3 (ref 0.7–3.1)
LYMPHOCYTES NFR BLD AUTO: 34.2 % (ref 19.6–45.3)
MCH RBC QN AUTO: 28.8 PG (ref 26.6–33)
MCHC RBC AUTO-ENTMCNC: 32 G/DL (ref 31.5–35.7)
MCV RBC AUTO: 90.2 FL (ref 79–97)
MONOCYTES # BLD AUTO: 0.57 10*3/MM3 (ref 0.1–0.9)
MONOCYTES NFR BLD AUTO: 10.4 % (ref 5–12)
NEUTROPHILS NFR BLD AUTO: 2.44 10*3/MM3 (ref 1.7–7)
NEUTROPHILS NFR BLD AUTO: 44.7 % (ref 42.7–76)
NRBC BLD AUTO-RTO: 0 /100 WBC (ref 0–0.2)
PLATELET # BLD AUTO: 249 10*3/MM3 (ref 140–450)
PMV BLD AUTO: 9.3 FL (ref 6–12)
POTASSIUM SERPL-SCNC: 5 MMOL/L (ref 3.5–4.7)
PROT SERPL-MCNC: 7.3 G/DL (ref 6.3–8)
RBC # BLD AUTO: 4.3 10*6/MM3 (ref 3.77–5.28)
SODIUM SERPL-SCNC: 140 MMOL/L (ref 134–145)
WBC # BLD AUTO: 5.47 10*3/MM3 (ref 3.4–10.8)

## 2020-11-12 PROCEDURE — 85025 COMPLETE CBC W/AUTO DIFF WBC: CPT

## 2020-11-12 PROCEDURE — 36415 COLL VENOUS BLD VENIPUNCTURE: CPT

## 2020-11-12 PROCEDURE — 80053 COMPREHEN METABOLIC PANEL: CPT

## 2020-11-12 PROCEDURE — 99213 OFFICE O/P EST LOW 20 MIN: CPT | Performed by: INTERNAL MEDICINE

## 2020-11-12 RX ORDER — VALACYCLOVIR HYDROCHLORIDE 1 G/1
TABLET, FILM COATED ORAL
COMMUNITY
Start: 2020-04-01

## 2020-11-12 RX ORDER — INFLUENZA A VIRUS A/MICHIGAN/45/2015 X-275 (H1N1) ANTIGEN (FORMALDEHYDE INACTIVATED), INFLUENZA A VIRUS A/SINGAPORE/INFIMH-16-0019/2016 IVR-186 (H3N2) ANTIGEN (FORMALDEHYDE INACTIVATED), INFLUENZA B VIRUS B/PHUKET/3073/2013 ANTIGEN (FORMALDEHYDE INACTIVATED), AND INFLUENZA B VIRUS B/MARYLAND/15/2016 BX-69A ANTIGEN (FORMALDEHYDE INACTIVATED) 60; 60; 60; 60 UG/.7ML; UG/.7ML; UG/.7ML; UG/.7ML
INJECTION, SUSPENSION INTRAMUSCULAR
COMMUNITY
Start: 2020-08-28

## 2020-11-12 RX ORDER — VALACYCLOVIR HYDROCHLORIDE 500 MG/1
TABLET, FILM COATED ORAL
COMMUNITY
Start: 2020-08-24 | End: 2020-11-12 | Stop reason: SDUPTHER

## 2021-03-02 DIAGNOSIS — Z23 IMMUNIZATION DUE: ICD-10-CM

## 2021-09-17 ENCOUNTER — APPOINTMENT (OUTPATIENT)
Dept: WOMENS IMAGING | Facility: HOSPITAL | Age: 71
End: 2021-09-17

## 2021-09-17 PROCEDURE — 77063 BREAST TOMOSYNTHESIS BI: CPT | Performed by: RADIOLOGY

## 2021-09-17 PROCEDURE — 77067 SCR MAMMO BI INCL CAD: CPT | Performed by: RADIOLOGY

## 2022-01-05 ENCOUNTER — APPOINTMENT (OUTPATIENT)
Dept: WOMENS IMAGING | Facility: HOSPITAL | Age: 72
End: 2022-01-05

## 2022-01-05 PROCEDURE — 77080 DXA BONE DENSITY AXIAL: CPT | Performed by: RADIOLOGY

## 2022-10-03 ENCOUNTER — APPOINTMENT (OUTPATIENT)
Dept: WOMENS IMAGING | Facility: HOSPITAL | Age: 72
End: 2022-10-03

## 2022-10-03 PROCEDURE — 77067 SCR MAMMO BI INCL CAD: CPT | Performed by: RADIOLOGY

## 2022-10-03 PROCEDURE — 77063 BREAST TOMOSYNTHESIS BI: CPT | Performed by: RADIOLOGY

## 2023-03-13 NOTE — H&P
[facility]  Pre Procedure History & Physical    Chief Complaint:   History of polyps    Subjective     HPI:   This 70-year-old female presents the endoscopy suite for colonoscopic evaluation.  She has a personal history of polyps.  Last colonoscopy performed in May 2017.    Past Medical History:   Past Medical History:   Diagnosis Date   • Arthritis    • Basal cell carcinoma     followed by Dermatology   • Chronic gastritis    • DCIS (ductal carcinoma in situ)     followed by Dr. Denny (RIGHT)   • Dry eye    • Elevated HDL    • History of colon polyps     followed by GI, Dr. Cuba   • HSV-2 seropositive    • Melanoma (CMS/HCC)    • Osteopenia     followed by Gynecology, Dr. Tequila Boyle   • Pelvic prolapse    • Recurrent sinusitis    • Slow to wake up after anesthesia    • Tinnitus        Past Surgical History:  Past Surgical History:   Procedure Laterality Date   • ABDOMINOPLASTY  2004   • ADENOIDECTOMY  1955   • BREAST RECONSTRUCTION Right 5/15/2019    Procedure: RIGHT PLACEMENT OF TISSUE EXPANDER WITH ALLODERM;  Surgeon: ROCK Mathur MD;  Location: [facility];  Service: Plastics   • BREAST SURGERY Left 1976    LUMPECTOMY   • BREAST SURGERY Left 2004    LUMPECTOMY   • BREAST TISSUE EXPANDER REMOVAL INSERTION OF IMPLANT Bilateral 9/23/2019    Procedure: RIGHT BREAST TISSUE EXPANDER REMOVAL INSERTION OF IMPLANT LEFT REDUCTION OF SYMMETRY AND REMOVAL OF LEFT NIPPLE;  Surgeon: ROCK Mathur MD;  Location: [facility];  Service: Plastics   • BROW LIFT AND BLEPHAROPLASTY  2004   • COLONOSCOPY  06/03/2013    TUBULAR ADENOMAS, TORT COLON,  DR. CUBA   • COLONOSCOPY W/ POLYPECTOMY     • CYSTOCELE REPAIR  05/2017   • DILATATION AND CURETTAGE     • ENDOSCOPY     • EYE SURGERY     • HAND SURGERY  1990   • HYSTERECTOMY  2017   • LAPAROTOMY OOPHERECTOMY  1994   • LARYNGOSCOPY      IRRITATION   DR. CORREA   • MASTECTOMY W/ SENTINEL NODE BIOPSY Right 5/15/2019    Procedure: RIGHT  BREAST MASTECTOMY WITH SENTINEL NODE BIOPSY;  Surgeon: Daniel Denny MD;  Location: [facility] OR;  Service: General   • OOPHORECTOMY     • TONSILLECTOMY  1955   • TUBAL ABDOMINAL LIGATION  1980   • WISDOM TOOTH EXTRACTION  1970       Family History:  Family History   Problem Relation Age of Onset   • Cancer Father         Skin   • Heart disease Father    • Stroke Father    • Cancer Sister         skin   • Macular degeneration Sister    • Arthritis Sister    • Alcohol abuse Mother    • Scleroderma Mother    • Malig Hyperthermia Neg Hx        Social History:   reports that she has never smoked. She has never used smokeless tobacco. She reports that she drinks alcohol. She reports that she does not use drugs.    Medications:   No medications prior to admission.       Allergies:  Sulfa antibiotics    ROS:    Pertinent items are noted in HPI, all other systems reviewed and negative     Objective     not currently breastfeeding.    Physical Exam   Constitutional: Pt is oriented to person, place, and time and well-developed, well-nourished, and in no distress.   Mouth/Throat: Oropharynx is clear and moist.   Neck: Normal range of motion.   Cardiovascular: Normal rate, regular rhythm and normal heart sounds.    Pulmonary/Chest: Effort normal and breath sounds normal.   Abdominal: Soft. Nontender  Skin: Skin is warm and dry.   Psychiatric: Mood, memory, affect and judgment normal.     Assessment/Plan     Diagnosis:  Polyps    Anticipated Surgical Procedure:  Colonoscopy    The risks, benefits, and alternatives of this procedure have been discussed with the patient or the responsible party- the patient understands and agrees to proceed.                                                           Ilumya Counseling: I discussed with the patient the risks of tildrakizumab including but not limited to immunosuppression, malignancy, posterior leukoencephalopathy syndrome, and serious infections.  The patient understands that monitoring is required including a PPD at baseline and must alert us or the primary physician if symptoms of infection or other concerning signs are noted.

## 2023-10-04 ENCOUNTER — APPOINTMENT (OUTPATIENT)
Dept: WOMENS IMAGING | Facility: HOSPITAL | Age: 73
End: 2023-10-04
Payer: MEDICARE

## 2023-10-04 PROCEDURE — 77067 SCR MAMMO BI INCL CAD: CPT | Performed by: RADIOLOGY

## 2023-10-04 PROCEDURE — 77063 BREAST TOMOSYNTHESIS BI: CPT | Performed by: RADIOLOGY

## 2024-03-04 NOTE — ED PROVIDER NOTES
Pt presents to the ED c/o right breast pain for over one week. Patient dx with mastitis by her OBGYN 3 days ago. Currently on doxycycline. Patient c/o continued pain.     PHYSICAL EXAM  Enlarged right breast that is diffusely firm circumferentially. No obvious focal abscess or purulence at the skin. Erythema and warmth noted.   Lungs clear, symmetric, nonlabored. HR RRR.   Neuro: AOx3, no focal deficits    Vital signs and nursing notes reviewed.    Plan: Admit with [facility] with consultation with Dr. Denny. CT Chest pending.     DIAGNOSIS  Final diagnoses:   Abscess of right breast unrelated to pregnancy or breastfeeding       Attestation:    The ROSIE and I have discussed this patient's history, physical exam, and treatment plan.  I have reviewed the documentation and personally had a face to face interaction with the patient. I affirm the documentation and agree with the treatment and plan.  The attached note describes my personal findings.    Documentation assistance provided by ronda Avery for Dr. Ki Alfaro MD. Information recorded by the scribe was done at my direction and has been verified and validated by me.        Vida Avery  02/10/19 1205       Vida Aevry  02/10/19 130       Ki Alfaro MD  02/10/19 1842     Fernanda Florence RN  [facility]

## 2024-06-03 NOTE — PROGRESS NOTES
"Chief Complaint  Establish Care and Hypothyroidism    Subjective        HPI   Gayatri presents to [facility] for a new pt visit. She used to see Dr. Amber Torre.     Autoimmune thyroiditis: She states she is very concerned about this. Has seen moreno and a nurse (practitioner?) ESTEBAN Womack at [facility] who reportedly specializes in thyroid issues. It was recommended she go on GF diet. Medication has not been recommended as TSH has been normal. She is reading a book called Hashimotos Protocol. She is concerned this all  could lead to another autoimmune health. She is very concerned about weight loss, feels it's due to restrictive diet that has been recommended.     She is taking the following supplements (doses to be scanned into chart):  AMLA  B12  Vit C  Ca++  Carotene  D-Mannose  Vit D 3  Vitamin E  Vitamin K2  Krill oil  Iodine 3x per week  Kyolic garlic  Lecithin  Magnesium  Selenium  CoQ10  Zinc    Labs from [facility] 5/30/24  Vit D 46.1  TSH 1.480  T4 free 1.27  TPO 68  Thyroglobulin Ab 1.3  She is concerned about TSH and ab increase    Sleep is not good  Feels it's some related to allergies  Has tried all sorts of sleep aids: melatonin, L-theonine, magnesium  Minimal caffeine use, seldom ETOH use  Issue falling asleep and staying asleep  Awakens at 0300 to use bathroom, believes mind gets overactive  Things that make others drowsy make her \"wired\"  Sleeps 4-5 hours  No hot flashes, anxiety, depression    HLD: Eliminated fats, vegan diet, did not help much  After a month of GF diet, cholesterol improved dramatically    Pre-diabetes: A1c 5.7    Bone density:  SCANNED - DEXA (01/05/2022)   Takes Vit K2, Calcium, Vitamin D    Takes selenium for \"mineral deficiency\" that is thought to have happened while on vegan diet  Currently on a pescatarian diet    Hx low grade DCIS: Used to see Dr. Lund, excerpt from her note 2020    1.  Low-grade DCIS extensive involvement of the " "right breast ER positive [place] negative post mastectomy with immediate reconstruction  Tried tamoxifen for the 4 months and then discontinued because of recurrent UTIs vaginal infections and sleeplessness  2.  Postoperative pain due to expander improved  3.  Urinary difficulty due to postmenopausal estrogen deprivation using vaginal estrogens sparingly   4.  Negative family history of breast or ovarian cancer     Plan  1.  Patient has declined any prevention so I think we do not need to follow her at this time but would be happy to see her as needed    Goes to [facility] for regular mammograms  Gyn retired, now seeing a PA at [facility]      Objective   Vital Signs:  Vitals:    06/10/24 1030   BP: 120/66   BP Location: Right arm   Patient Position: Sitting   Cuff Size: Adult   Pulse: 61   SpO2: 98%   Weight: 51.5 kg (113 lb 9.6 oz)   Height: 162.6 cm (64.02\")          Physical Exam  Constitutional:       General: She is not in acute distress.     Appearance: Normal appearance. She is not toxic-appearing.   HENT:      Head: Normocephalic and atraumatic.      Mouth/Throat:      Mouth: Mucous membranes are moist.   Eyes:      General: No scleral icterus.     Conjunctiva/sclera: Conjunctivae normal.   Cardiovascular:      Rate and Rhythm: Normal rate and regular rhythm.      Heart sounds: Normal heart sounds. No murmur heard.     No friction rub. No gallop.   Pulmonary:      Effort: Pulmonary effort is normal. No respiratory distress.      Breath sounds: Normal breath sounds.   Musculoskeletal:         General: No swelling, tenderness or deformity. Normal range of motion.      Cervical back: Normal range of motion and neck supple.   Skin:     General: Skin is warm and dry.      Coloration: Skin is not jaundiced.      Findings: No lesion or rash.   Neurological:      General: No focal deficit present.      Mental Status: She is alert and oriented to person, place, and time.   Psychiatric:         Mood and Affect: Mood normal.    "      Behavior: Behavior normal.         Judgment: Judgment normal.          Result Review :     The following data was reviewed by: Kristina Silva MD on 06/10/2024:  T4, FREE (09/09/2021 10:56)  TSH (09/09/2021 10:56)  COMPREHENSIVE METABOLIC PANEL (03/25/2022 10:14)  CBC AND DIFFERENTIAL (03/25/2022 10:14)  LIPID PANEL (03/25/2022 10:14)  VITAMIN B12 (03/25/2022 10:14)  HIGH SENSITIVITY CRP (03/25/2022 10:14)  COLONOSCOPY (07/07/2020 13:57)  C-scope due 7/2025         Assessment and Plan    Diagnoses and all orders for this visit:    1. Hashimoto's thyroiditis (Primary)  Assessment & Plan:  Saw endocrinologist, would like referral to a new one  Pt concerned because TSH increased to 1.4 and antibodies were increasing.  I told her I do not typically recommend trending autoantibodies but certainly can trend TSH.  She may eventually need thyroid replacement therapy to but does not currently need it.  Requests referral to a new endocrinologist for their input, referred per her request.  She has been addressing this with a GF diet which has helped her HLD but unfortunately she states she cannot afford to lose any more weight on such a restrictive diet  She continues to follow with an NP at [facility] as well.    Orders:  -     Ambulatory Referral to Endocrinology  -     Lipid Panel  -     TSH Rfx On Abnormal To Free T4    2. History of hyperlipidemia  Assessment & Plan:  FLP ordered for prior to next visit    Orders:  -     Comprehensive Metabolic Panel  -     Lipid Panel    3. Prediabetes  Assessment & Plan:  Prior A1c 5.7, not due for labs  Has made dramatic dietary changes    Orders:  -     Ambulatory Referral to Endocrinology  -     Hemoglobin A1c    4. Chronic insomnia  Assessment & Plan:  Poor sleep certainly could be contributing to overall health/inflammation  Has issues with sleep latency and sleep maintenance.  Recommended consideration of cognitive behavioral therapy for insomnia, referred to sleep  psychology (pt agreeable)    Orders:  -     Ambulatory Referral to Psychology    5. Osteopenia, unspecified location  Assessment & Plan:  Takes Vit K2, Calcium, Vitamin D      6. Screening for deficiency anemia  -     CBC (No Diff)    7. Screening for lipid disorders  -     Lipid Panel    8. Ductal carcinoma in situ (DCIS) of right breast  Assessment & Plan:  Goes to [facility] for regular mammograms  Gyn retired, now seeing a PA at [facility] for gyn care including mammograms and breast exams  Used to see Dr. Lund  Pt preferred to hold off on Tamoxifen      9. Takes dietary supplements  Assessment & Plan:  Discussed possible link between fish oil and A fib  Counseled patient that dietary supplements are not FDA-regulated. Thus,  there is a risk of inconsistent dosing and no guarantee of purity/lack of adulterants. Also risk of interactions/adverse side effects. It is preferred for patient to get nutrients through a healthy, well-balanced diet.             Follow Up   Return in about 5 months (around 11/10/2024) for Medicare Wellness and regular appt-30 minutes.  Patient was given instructions and counseling regarding her condition or for health maintenance advice. Please see specific information pulled into the AVS if appropriate.

## 2024-06-10 ENCOUNTER — OFFICE VISIT (OUTPATIENT)
Dept: FAMILY MEDICINE CLINIC | Facility: CLINIC | Age: 74
End: 2024-06-10
Payer: MEDICARE

## 2024-06-10 VITALS
BODY MASS INDEX: 19.39 KG/M2 | HEART RATE: 61 BPM | WEIGHT: 113.6 LBS | HEIGHT: 64 IN | SYSTOLIC BLOOD PRESSURE: 120 MMHG | OXYGEN SATURATION: 98 % | DIASTOLIC BLOOD PRESSURE: 66 MMHG

## 2024-06-10 DIAGNOSIS — M85.80 OSTEOPENIA, UNSPECIFIED LOCATION: ICD-10-CM

## 2024-06-10 DIAGNOSIS — Z86.39 HISTORY OF HYPERLIPIDEMIA: ICD-10-CM

## 2024-06-10 DIAGNOSIS — Z13.220 SCREENING FOR LIPID DISORDERS: ICD-10-CM

## 2024-06-10 DIAGNOSIS — Z78.9 TAKES DIETARY SUPPLEMENTS: ICD-10-CM

## 2024-06-10 DIAGNOSIS — D05.11 DUCTAL CARCINOMA IN SITU (DCIS) OF RIGHT BREAST: ICD-10-CM

## 2024-06-10 DIAGNOSIS — R73.03 PREDIABETES: ICD-10-CM

## 2024-06-10 DIAGNOSIS — Z13.0 SCREENING FOR DEFICIENCY ANEMIA: ICD-10-CM

## 2024-06-10 DIAGNOSIS — F51.04 CHRONIC INSOMNIA: ICD-10-CM

## 2024-06-10 DIAGNOSIS — E06.3 HASHIMOTO'S THYROIDITIS: Primary | ICD-10-CM

## 2024-06-10 PROBLEM — N63.10 MASS OF BREAST, RIGHT: Status: RESOLVED | Noted: 2019-02-22 | Resolved: 2024-06-10

## 2024-06-10 PROBLEM — N61.0 CELLULITIS OF RIGHT BREAST: Status: RESOLVED | Noted: 2019-02-10 | Resolved: 2024-06-10

## 2024-06-10 PROBLEM — M79.671 RIGHT FOOT PAIN: Status: RESOLVED | Noted: 2018-06-14 | Resolved: 2024-06-10

## 2024-06-10 PROCEDURE — G2211 COMPLEX E/M VISIT ADD ON: HCPCS | Performed by: INTERNAL MEDICINE

## 2024-06-10 PROCEDURE — 1159F MED LIST DOCD IN RCRD: CPT | Performed by: INTERNAL MEDICINE

## 2024-06-10 PROCEDURE — 1160F RVW MEDS BY RX/DR IN RCRD: CPT | Performed by: INTERNAL MEDICINE

## 2024-06-10 PROCEDURE — 99204 OFFICE O/P NEW MOD 45 MIN: CPT | Performed by: INTERNAL MEDICINE

## 2024-06-10 PROCEDURE — 1126F AMNT PAIN NOTED NONE PRSNT: CPT | Performed by: INTERNAL MEDICINE

## 2024-06-10 RX ORDER — VITAMIN B COMPLEX
1 CAPSULE ORAL DAILY
COMMUNITY

## 2024-06-10 NOTE — ASSESSMENT & PLAN NOTE
Goes to [facility] for regular mammograms  Gyn retired, now seeing a PA at [facility] for gyn care including mammograms and breast exams  Used to see Dr. Lund  Pt preferred to hold off on Tamoxifen

## 2024-06-10 NOTE — ASSESSMENT & PLAN NOTE
Poor sleep certainly could be contributing to overall health/inflammation  Has issues with sleep latency and sleep maintenance.  Recommended consideration of cognitive behavioral therapy for insomnia, referred to sleep psychology (pt agreeable)

## 2024-06-10 NOTE — ASSESSMENT & PLAN NOTE
Saw endocrinologist, would like referral to a new one  Pt concerned because TSH increased to 1.4 and antibodies were increasing.  I told her I do not typically recommend trending autoantibodies but certainly can trend TSH.  She may eventually need thyroid replacement therapy to but does not currently need it.  Requests referral to a new endocrinologist for their input, referred per her request.  She has been addressing this with a GF diet which has helped her HLD but unfortunately she states she cannot afford to lose any more weight on such a restrictive diet  She continues to follow with an NP at [facility] as well.

## 2024-06-10 NOTE — ASSESSMENT & PLAN NOTE
Discussed possible link between fish oil and A fib  Counseled patient that dietary supplements are not FDA-regulated. Thus,  there is a risk of inconsistent dosing and no guarantee of purity/lack of adulterants. Also risk of interactions/adverse side effects. It is preferred for patient to get nutrients through a healthy, well-balanced diet.

## 2024-06-11 ENCOUNTER — PATIENT ROUNDING (BHMG ONLY) (OUTPATIENT)
Dept: FAMILY MEDICINE CLINIC | Facility: CLINIC | Age: 74
End: 2024-06-11
Payer: MEDICARE

## 2024-06-11 NOTE — PROGRESS NOTES
A My-Chart message has been sent to the patient for PATIENT ROUNDING with [facility]

## 2024-08-13 ENCOUNTER — LAB (OUTPATIENT)
Facility: HOSPITAL | Age: 74
End: 2024-08-13
Payer: MEDICARE

## 2024-08-13 ENCOUNTER — OFFICE VISIT (OUTPATIENT)
Dept: ENDOCRINOLOGY | Age: 74
End: 2024-08-13
Payer: MEDICARE

## 2024-08-13 VITALS
DIASTOLIC BLOOD PRESSURE: 72 MMHG | WEIGHT: 114.8 LBS | BODY MASS INDEX: 19.69 KG/M2 | OXYGEN SATURATION: 98 % | TEMPERATURE: 96.8 F | HEART RATE: 75 BPM | SYSTOLIC BLOOD PRESSURE: 110 MMHG

## 2024-08-13 DIAGNOSIS — R94.6 ABNORMAL THYROID EXAM: ICD-10-CM

## 2024-08-13 DIAGNOSIS — G47.9 DIFFICULTY SLEEPING: ICD-10-CM

## 2024-08-13 DIAGNOSIS — R76.8 ANTI-TPO ANTIBODIES PRESENT: Primary | ICD-10-CM

## 2024-08-13 PROCEDURE — 86258 DGP ANTIBODY EACH IG CLASS: CPT | Performed by: STUDENT IN AN ORGANIZED HEALTH CARE EDUCATION/TRAINING PROGRAM

## 2024-08-13 PROCEDURE — 36415 COLL VENOUS BLD VENIPUNCTURE: CPT | Performed by: STUDENT IN AN ORGANIZED HEALTH CARE EDUCATION/TRAINING PROGRAM

## 2024-08-13 PROCEDURE — 86364 TISS TRNSGLTMNASE EA IG CLAS: CPT | Performed by: STUDENT IN AN ORGANIZED HEALTH CARE EDUCATION/TRAINING PROGRAM

## 2024-08-13 PROCEDURE — 86231 EMA EACH IG CLASS: CPT | Performed by: STUDENT IN AN ORGANIZED HEALTH CARE EDUCATION/TRAINING PROGRAM

## 2024-08-13 PROCEDURE — 82784 ASSAY IGA/IGD/IGG/IGM EACH: CPT | Performed by: STUDENT IN AN ORGANIZED HEALTH CARE EDUCATION/TRAINING PROGRAM

## 2024-08-13 RX ORDER — VALACYCLOVIR HYDROCHLORIDE 500 MG/1
500 TABLET, FILM COATED ORAL DAILY
COMMUNITY
Start: 2024-06-25

## 2024-08-13 NOTE — PROGRESS NOTES
"Chief Complaint  Hashimoto's Thyroiditis (Pt has been feeling fatigue and symptoms of loss of hair. Pt taking Iodine 150 mcg 3x/week.)    Subjective        Gayatrimonster Lee presents to [facility] ENDOCRIN[facility] to establish care.     History of Present Illness      Referred for further management of positive thyroid antibodies  Noted to have positive TPO and thyroglobulin antibody in June 2023  Evaluated by 2 endocrinologist in the past  Complains of hair loss, fatigue and low energy level, reports sleeping problems    Due to fatigue her cortisol level was checked which was normal    Cortisol  15.3   TSH 3.26    Free T4  1.14   TPO positive   TG ab positive     5/2024   TSH  1.48   Free T4 1.27   Was recommended by endocrinologist/[facility] PCP to try gluten-free diet   She also was old to try Hashimoto's protocol to prevent other autoimmune problems    No family history of thyroid disease    Objective   Vital Signs:  /72   Pulse 75   Temp 96.8 °F (36 °C) (Temporal)   Wt 52.1 kg (114 lb 12.8 oz)   SpO2 98%   BMI 19.69 kg/m²   Estimated body mass index is 19.69 kg/m² as calculated from the following:    Height as of 6/10/24: 162.6 cm (64.02\").    Weight as of this encounter: 52.1 kg (114 lb 12.8 oz).       BMI is within normal parameters. No other follow-up for BMI required.          Physical Exam  Constitutional:       Appearance: Normal appearance.   HENT:      Head: Normocephalic and atraumatic.   Eyes:      Extraocular Movements: Extraocular movements intact.   Neck:      Comments: Thyroid nodularity   Cardiovascular:      Rate and Rhythm: Normal rate.   Pulmonary:      Effort: Pulmonary effort is normal.      Breath sounds: Normal breath sounds. No wheezing.   Abdominal:      Palpations: Abdomen is soft.      Tenderness: There is no abdominal tenderness.   Musculoskeletal:         General: No swelling. Normal range of motion.      Cervical back: No tenderness.   Neurological:      Mental " "Status: She is alert and oriented to person, place, and time.   Psychiatric:         Mood and Affect: Mood normal.        Result Review :  The following data was reviewed by: Mahrokh Nokhbehzaeim, MD on 08/13/2024:       Free T4   Date Value Ref Range Status   11/21/2023 1.14 0.93 - 1.70 ng/dL Final      ACTH   Date Value Ref Range Status   11/21/2023 25.2 7.2 - 63.3 pg/mL Final     Comment:     (note)  INTERPRETIVE INFORMATION: Adrenocorticotropic Hormone    Reference interval based on samples collected between 7 a.m. and   10 a.m.  No reference intervals established for p.m. collections.    Pediatric reference values are the same as adults (Acta Paediatr   Scand 1981;70:341-345).  This assay measures intact ACTH 1-39;   some types of synthetic ACTH and ACTH fragments are not detected   by this assay.  Performed By: [facility]  [address] [place] [id number]  : Marshal Almeida MD, PhD  CLIA Number: [id number]      Cortisol   Date Value Ref Range Status   11/21/2023 15.3 3.7 - 19.4 ug/dL Final     Comment:     (note)  Expected values (ug/dL):  3.7 - 19.4 (before 10AM)   2.9 - 17.3 (after 5PM).      No results found for: \"THGAB\"   Thyroid Peroxidase Antibody   Date Value Ref Range Status   11/21/2023 58.30 (H) <5.61 (IU)/mL Final                   Assessment and Plan   Diagnoses and all orders for this visit:    1. Anti-TPO antibodies present (Primary)  Assessment & Plan:  Noted to have positive TPO and thyroglobulin antibody  Explained to patient 5 to 20% of the general population have positive thyroglobulin antibody and 8 to 27% have positive TPO antibody without having thyroid disease  TFT normal  Does not need to start taking thyroid medication  She will repeat thyroid function test in September  Will check celiac panel    Orders:  -     Celiac Comprehensive Panel; Future  -     Celiac Comprehensive Panel    2. Abnormal thyroid exam  Assessment & Plan:  Thyroid us " ordered    Orders:  -     US Thyroid; Future    3. Difficulty sleeping  Assessment & Plan:  Has had sleeping problem has been referred to sleep specialist by PCP, has not been schedule yet  New referral placed     Orders:  -     Ambulatory Referral to Sleep Medicine      Many questions answered, explained to patient that I am not familiar with Hashimoto's protocol.       Follow Up   Return in about 6 months (around 2/13/2025).  Patient was given instructions and counseling regarding her condition or for health maintenance advice. Please see specific information pulled into the AVS if appropriate.

## 2024-08-13 NOTE — ASSESSMENT & PLAN NOTE
Noted to have positive TPO and thyroglobulin antibody  Explained to patient 5 to 20% of the general population have positive thyroglobulin antibody and 8 to 27% have positive TPO antibody without having thyroid disease  TFT normal  Does not need to start taking thyroid medication  She will repeat thyroid function test in September  Will check celiac panel

## 2024-08-13 NOTE — ASSESSMENT & PLAN NOTE
Has had sleeping problem has been referred to sleep specialist by PCP, has not been schedule yet  New referral placed

## 2024-08-14 LAB
ENDOMYSIUM IGA SER QL: NEGATIVE
GLIADIN PEPTIDE IGA SER-ACNC: 9 UNITS (ref 0–19)
GLIADIN PEPTIDE IGG SER-ACNC: 2 UNITS (ref 0–19)
IGA SERPL-MCNC: 294 MG/DL (ref 64–422)
TTG IGA SER-ACNC: <2 U/ML (ref 0–3)
TTG IGG SER-ACNC: 8 U/ML (ref 0–5)

## 2024-08-15 ENCOUNTER — TELEPHONE (OUTPATIENT)
Dept: ENDOCRINOLOGY | Age: 74
End: 2024-08-15
Payer: MEDICARE

## 2024-08-15 NOTE — TELEPHONE ENCOUNTER
Called and left pt a detailed message to contact Dr. Leanna Torre for GI referral.     ----- Message from Mahrokh Nokhbehzaeim sent at 8/14/2024  4:40 PM EDT -----  Patient follows with 2 PCPs.  She can contact Leanna Torre MD for GI referral.  Thanks  Dr. PRESLEY  ----- Message -----  From: Kristina Silva MD  Sent: 8/14/2024   4:34 PM EDT  To: Mahrokh Nokhbehzaeim, MD    Thanks. I may not be seeing this pt again as I will be moving on from [facility] in September. Perhaps you could refer her to GI? Thank you.  ----- Message -----  From: Nokhbehzaeim, Mahrokh, MD  Sent: 8/14/2024   4:29 PM EDT  To: Kristina Silva MD    Hi, forwarding her results celiac panel came back abnormal.  ----- Message -----  From: Lab, Background User  Sent: 8/14/2024   4:13 PM EDT  To: Mahrokh Nokhbehzaeim, MD

## 2024-08-16 ENCOUNTER — PATIENT ROUNDING (BHMG ONLY) (OUTPATIENT)
Dept: ENDOCRINOLOGY | Age: 74
End: 2024-08-16
Payer: MEDICARE

## 2024-08-16 ENCOUNTER — TELEPHONE (OUTPATIENT)
Dept: GASTROENTEROLOGY | Facility: CLINIC | Age: 74
End: 2024-08-16
Payer: MEDICARE

## 2024-08-22 ENCOUNTER — HOSPITAL ENCOUNTER (OUTPATIENT)
Facility: HOSPITAL | Age: 74
Discharge: HOME | End: 2024-08-22
Admitting: STUDENT IN AN ORGANIZED HEALTH CARE EDUCATION/TRAINING PROGRAM
Payer: MEDICARE

## 2024-08-22 DIAGNOSIS — R94.6 ABNORMAL THYROID EXAM: ICD-10-CM

## 2024-08-22 PROCEDURE — 76536 US EXAM OF HEAD AND NECK: CPT

## 2024-08-27 ENCOUNTER — OFFICE VISIT (OUTPATIENT)
Dept: GASTROENTEROLOGY | Facility: CLINIC | Age: 74
End: 2024-08-27
Payer: MEDICARE

## 2024-08-27 VITALS
DIASTOLIC BLOOD PRESSURE: 74 MMHG | HEART RATE: 70 BPM | TEMPERATURE: 97 F | SYSTOLIC BLOOD PRESSURE: 114 MMHG | HEIGHT: 64 IN | WEIGHT: 114 LBS | BODY MASS INDEX: 19.46 KG/M2

## 2024-08-27 DIAGNOSIS — K63.5 POLYP OF COLON, UNSPECIFIED PART OF COLON, UNSPECIFIED TYPE: Primary | ICD-10-CM

## 2024-08-27 PROCEDURE — 1160F RVW MEDS BY RX/DR IN RCRD: CPT | Performed by: INTERNAL MEDICINE

## 2024-08-27 PROCEDURE — 99204 OFFICE O/P NEW MOD 45 MIN: CPT | Performed by: INTERNAL MEDICINE

## 2024-08-27 PROCEDURE — 1159F MED LIST DOCD IN RCRD: CPT | Performed by: INTERNAL MEDICINE

## 2024-08-27 NOTE — PROGRESS NOTES
Chief Complaint   Patient presents with    Celiac Disease        Gayatri Lee is a  74 y.o. female here for a follow up visit for abnormal celiac panel    HPI this 74-year-old female patient followed by endocrinology for Hashimoto's thyroiditis was told to adhere to a gluten-free diet to facilitate improvement of her thyroiditis.  She did undergo a celiac panel in the recent past which showed 1 antibody specifically tTG-IgA to be mildly positive.  All the other parameters were normal.  She had adhere to a gluten-free diet since December and is not clear whether this had a dramatic influence on her thyroiditis but she is being monitored.  I advised that she may or may not have wheat protein allergy on the basis of 1 weakly positive antibody but that she can discuss this with her endocrinologist as it relates to adjusting her diet or maintaining strict gluten-free.  I do not believe that upper endoscopy with biopsies with provide much input as I would suspect if there were a gluten enteropathic component she would have recovered from that since she has been gluten-free for at least the last 8 months.  She voiced understanding of this.  She is due for colonoscopy in 2025 due to a personal history of polyps and breast cancer.  We talked about the risk-benefit ratio and she is amenable to undergoing evaluation next year.    Past Medical History:   Diagnosis Date    Abnormal thyroid exam 08/13/2024    Allergic 1990s    Arthritis     Basal cell carcinoma     followed by Dermatology    Cellulitis of right breast 02/10/2019    Chronic gastritis     Colon polyp     DCIS (ductal carcinoma in situ)     followed by Dr. Denny (RIGHT)    Dry eye     Elevated HDL     GERD (gastroesophageal reflux disease) 2012?    no symtoms now    Hashimoto's thyroiditis May 2023    History of colon polyps     followed by GI, Dr. Cuba    HL (hearing loss) 2023    HSV-2 seropositive     Hyperlipidemia     Hypothyroidism May 2023    Mass of  breast, right 02/22/2019    Melanoma     Osteopenia     followed by Gynecology, Dr. Tequila Boyle    Pelvic prolapse     Polycystic ovary syndrome 1994    Recurrent sinusitis     Right foot pain 06/14/2018    Slow to wake up after anesthesia     Tinnitus        Current Outpatient Medications   Medication Sig Dispense Refill    B Complex Vitamins (vitamin b complex) capsule capsule Take 1 capsule by mouth Daily.      CALCIUM LACTATE PO Take 350 mg by mouth Daily.      cholecalciferol (VITAMIN D3) 1000 units tablet Take 0.5 tablets by mouth Daily.      FISH OIL-KRILL OIL PO Take  by mouth.      magnesium oxide (MAG-OX) 400 tablet tablet Take 150 mg by mouth Daily.      Menaquinone-7 (VITAMIN K2 PO) Take 200 mcg by mouth Daily.      NON FORMULARY Patient takes iodine      Selenium (SELENIMIN PO) Take 50 mcg by mouth.      valACYclovir (VALTREX) 500 MG tablet Take 1 tablet by mouth Daily.       No current facility-administered medications for this visit.       PRN Meds:.    Allergies   Allergen Reactions    Sulfa Antibiotics Rash       Social History     Socioeconomic History    Marital status:     Number of children: 3    Years of education: College   Tobacco Use    Smoking status: Never    Smokeless tobacco: Never    Tobacco comments:     parents were smokers   Vaping Use    Vaping status: Never Used   Substance and Sexual Activity    Alcohol use: Yes     Alcohol/week: 1.0 standard drink of alcohol     Types: 1 Glasses of wine per week     Comment: one drink a week or less    Drug use: No    Sexual activity: Yes     Partners: Male     Birth control/protection: Post-menopausal, Tubal ligation, Hysterectomy       Family History   Problem Relation Age of Onset    Cancer Father         skin cancer    Heart disease Father     Stroke Father     Asthma Father     Cancer Sister         skin cancer    Macular degeneration Sister     Arthritis Sister     Hearing loss Sister     Alcohol abuse Mother     Scleroderma  Mother     Miscarriages / Stillbirths Mother     Malig Hyperthermia Neg Hx        Review of Systems   Constitutional:  Negative for activity change, appetite change, fatigue and unexpected weight change.   HENT:  Negative for congestion, facial swelling, sore throat, trouble swallowing and voice change.    Eyes:  Negative for photophobia and visual disturbance.   Respiratory:  Negative for cough and choking.    Cardiovascular:  Negative for chest pain.   Gastrointestinal:  Negative for abdominal distention, abdominal pain, anal bleeding, blood in stool, constipation, diarrhea, nausea, rectal pain and vomiting.   Endocrine: Negative for polyphagia.   Musculoskeletal:  Negative for arthralgias, gait problem and joint swelling.   Skin:  Negative for color change, pallor and rash.   Allergic/Immunologic: Negative for food allergies.   Neurological:  Negative for speech difficulty and headaches.   Hematological:  Does not bruise/bleed easily.   Psychiatric/Behavioral:  Negative for agitation, confusion and sleep disturbance.        Vitals:    08/27/24 0959   BP: 114/74   Pulse: 70   Temp: 97 °F (36.1 °C)       Physical Exam  Constitutional:       Appearance: She is well-developed.   HENT:      Head: Normocephalic.   Eyes:      Conjunctiva/sclera: Conjunctivae normal.   Cardiovascular:      Rate and Rhythm: Normal rate and regular rhythm.   Pulmonary:      Breath sounds: Normal breath sounds.   Abdominal:      General: Bowel sounds are normal.      Palpations: Abdomen is soft.   Musculoskeletal:         General: Normal range of motion.      Cervical back: Normal range of motion.   Skin:     General: Skin is warm and dry.   Neurological:      Mental Status: She is alert and oriented to person, place, and time.   Psychiatric:         Behavior: Behavior normal.         ASSESSMENT   #1 Hashimoto's thyroiditis: Patient advised to go on gluten-free diet  #2 abnormal celiac panel with TTG IgA reflecting weakly positive all other  parameters being normal  #3 history of polyps      PLAN  Patient to discuss with endocrinologist regarding liberalization of her diet as it relates to wheat protein and the risk to benefit related to her underlying thyroiditis  Office follow-up in 1 year to schedule colonoscopic examination      ICD-10-CM ICD-9-CM   1. Polyp of colon, unspecified part of colon, unspecified type  K63.5 211.3

## 2024-10-07 ENCOUNTER — APPOINTMENT (OUTPATIENT)
Dept: WOMENS IMAGING | Facility: HOSPITAL | Age: 74
End: 2024-10-07
Payer: MEDICARE

## 2024-10-07 ENCOUNTER — TRANSCRIBE ORDERS (OUTPATIENT)
Dept: WOMENS IMAGING | Facility: HOSPITAL | Age: 74
End: 2024-10-07
Payer: MEDICARE

## 2024-10-07 ENCOUNTER — HOSPITAL ENCOUNTER (OUTPATIENT)
Dept: PET IMAGING | Facility: HOSPITAL | Age: 74
Discharge: HOME | End: 2024-10-07
Payer: MEDICARE

## 2024-10-07 DIAGNOSIS — M81.0 HIGH RISK FOR FRACTURE DUE TO OSTEOPOROSIS BY DEXA SCAN: Primary | ICD-10-CM

## 2024-10-07 DIAGNOSIS — M81.0 HIGH RISK FOR FRACTURE DUE TO OSTEOPOROSIS BY DEXA SCAN: ICD-10-CM

## 2024-10-07 PROCEDURE — 77063 BREAST TOMOSYNTHESIS BI: CPT | Performed by: RADIOLOGY

## 2024-10-07 PROCEDURE — 77080 DXA BONE DENSITY AXIAL: CPT

## 2024-10-07 PROCEDURE — 77067 SCR MAMMO BI INCL CAD: CPT | Performed by: RADIOLOGY

## 2024-10-14 ENCOUNTER — OFFICE VISIT (OUTPATIENT)
Dept: SLEEP MEDICINE | Facility: HOSPITAL | Age: 74
End: 2024-10-14
Payer: MEDICARE

## 2024-10-14 VITALS — BODY MASS INDEX: 19.39 KG/M2 | HEART RATE: 76 BPM | HEIGHT: 64 IN | OXYGEN SATURATION: 98 % | WEIGHT: 113.6 LBS

## 2024-10-14 DIAGNOSIS — H93.19 TINNITUS, UNSPECIFIED LATERALITY: ICD-10-CM

## 2024-10-14 DIAGNOSIS — G47.21 CIRCADIAN RHYTHM SLEEP DISORDER, DELAYED SLEEP PHASE TYPE: ICD-10-CM

## 2024-10-14 DIAGNOSIS — F51.04 CHRONIC INSOMNIA: ICD-10-CM

## 2024-10-14 DIAGNOSIS — G47.33 OSA (OBSTRUCTIVE SLEEP APNEA): ICD-10-CM

## 2024-10-14 DIAGNOSIS — G47.9 DIFFICULTY SLEEPING: Primary | ICD-10-CM

## 2024-10-14 PROCEDURE — 99204 OFFICE O/P NEW MOD 45 MIN: CPT | Performed by: PSYCHIATRY & NEUROLOGY

## 2024-10-14 PROCEDURE — 1159F MED LIST DOCD IN RCRD: CPT | Performed by: PSYCHIATRY & NEUROLOGY

## 2024-10-14 PROCEDURE — G0463 HOSPITAL OUTPT CLINIC VISIT: HCPCS

## 2024-10-14 PROCEDURE — 1160F RVW MEDS BY RX/DR IN RCRD: CPT | Performed by: PSYCHIATRY & NEUROLOGY

## 2024-10-14 NOTE — PROGRESS NOTES
Patient Care Team:  Provider, No Known as PCP - General  Jamison Castellanos MD as Consulting Physician (Dermatology)  Felix Cuba MD as Consulting Physician (Gastroenterology)  Tequila Boyle MD as Consulting Physician (Obstetrics and Gynecology)  Daniel Denny MD as Surgeon (Breast Surgery)  Daniel Denny MD as Referring Physician (Breast Surgery)  Camden Lund MD as Consulting Physician (Hematology and Oncology)  Chet Castorena MD, MPH as Consulting Physician (Sleep Medicine)        History of Present Illness  The patient presents for evaluation of sleep issues.    She has been experiencing sleep disturbances for several years, which she attributes to her perimenopausal period. Her primary sleep issue is an overactive mind, which either prevents her from falling asleep or disrupts her sleep when she wakes up during the night. Despite her efforts to maintain a routine and limit media use, her evening dance schedule (7:00 PM to 9:30-10:00 PM) often disrupts her sleep. She believes that the full moon affects her sleep. She does not snore, breathe through her nose, or experience sleep apnea. She tends to move from side to side during sleep but primarily sleeps on her back. She uses white noise to drown out her tinnitus and city noises. She has an adjustable bed, which helps manage her acid reflux.    Recently, her sleep quality has improved due to a supplement she takes at dinner or before bed. This supplement aids in initiating and maintaining sleep, allowing her to rest for longer periods. She occasionally takes hour-long naps. She typically goes dancing 3 to 4 nights a week and has noticed that going to bed earlier makes it easier for her to fall asleep. Before starting her supplement, she was getting about 6 hours of sleep per night, which she felt was insufficient. Now, she occasionally gets 8 hours of sleep, but her average is around 7 hours. She sometimes feels  extremely tired in the morning, even if she slept well the previous night.    She experiences leg discomfort due to her dancing activities, which can interfere with her ability to relax at night. Over-the-counter medications like Tylenol PM, Excedrin PM, and Benadryl have not been effective for her. She was prescribed Ambien after surgery, which made it difficult for her to sleep.    She has Hashimoto's thyroiditis, which has improved since she started a gluten-free diet recommended by two endocrinologists. She no longer experiences extreme fatigue as she did a year ago. Exercise used to help her sleep, but she no longer has the energy to exercise as much as she used to due to her Hashimoto's disease.    She does not currently have a primary care doctor. She took progesterone supplements for about a year but decided against hormone replacement due to a history of breast cancer. She takes numerous supplements. She visited a Chinese medicine doctor about 6 years ago who adjusted her circadian rhythms. She does not work and does not usually have to wake up early.    FAMILY HISTORY  Her sister has sleep issues.     Appointments are as follows Amla 600 mg, B12 250 mcg, vitamin C 500 mg, calcium 1000 mg, carotene 7500 mcg, D3 1000 international units, d-mannose 500 mg, vitamin E 670 mg, K2 750 mcg, Krill oil 800 mg, iodine 150 mg, Kyolic garlic 600 mg, lecithin 800 mg, magnesium 300 mg, selenium 100 mcg, turkey tail mushroom 2 g, CoQ 1000 mg, zinc 15 mg.  [place]: 9    Data Reviewed: Reviewed her medical chart and sleep questionnaire.      PMH:  Past Medical History:   Diagnosis Date    Abnormal thyroid exam 08/13/2024    Allergic 1990s    Arthritis     Basal cell carcinoma     followed by Dermatology    Cellulitis of right breast 02/10/2019    Chronic gastritis     Colon polyp     DCIS (ductal carcinoma in situ)     followed by Dr. Denny (RIGHT)    Dry eye     Elevated HDL     GERD (gastroesophageal reflux disease)  "2012?    no symtoms now    Hashimoto's thyroiditis May 2023    History of colon polyps     followed by GI, Dr. Cuba    HL (hearing loss) 2023    HSV-2 seropositive     Hyperlipidemia     Hypothyroidism May 2023    Mass of breast, right 02/22/2019    Melanoma     Osteopenia     followed by Gynecology, Dr. Tequila Boyle    Pelvic prolapse     Polycystic ovary syndrome 1994    Recurrent sinusitis     Right foot pain 06/14/2018    Slow to wake up after anesthesia     Tinnitus           Allergies:  Sulfa antibiotics     Medication Review:   Current Outpatient Medications on File Prior to Visit   Medication Sig Dispense Refill    B Complex Vitamins (vitamin b complex) capsule capsule Take 1 capsule by mouth Daily.      CALCIUM LACTATE PO Take 350 mg by mouth Daily.      cholecalciferol (VITAMIN D3) 1000 units tablet Take 0.5 tablets by mouth Daily.      FISH OIL-KRILL OIL PO Take  by mouth.      magnesium oxide (MAG-OX) 400 tablet tablet Take 150 mg by mouth Daily.      Menaquinone-7 (VITAMIN K2 PO) Take 200 mcg by mouth Daily.      NON FORMULARY Patient takes iodine      Selenium (SELENIMIN PO) Take 50 mcg by mouth.      valACYclovir (VALTREX) 500 MG tablet Take 1 tablet by mouth Daily.       No current facility-administered medications on file prior to visit.         Vital Signs:    Vitals:    10/14/24 1100   Pulse: 76   SpO2: 98%   Weight: 51.5 kg (113 lb 9.6 oz)   Height: 162.6 cm (64\")        Body mass index is 19.5 kg/m².  Neck Circumference: 12.25 inches  .BMIFOLLOWUP  BMI is within normal parameters. No other follow-up for BMI required.      Physical Exam:    Constitutional:  Well developed 74 y.o. female that appears in no apparent distress.  Awake & oriented times 3.  Normal mood with normal recent and remote memory and normal judgement.  Eyes:  Conjunctivae normal.  Oropharynx: Moist mucous membranes without exudate and Mallampati 4 with macroglossia  Neck: Trachea midline  Respiratory: Effort is not " labored  Cardiovascular: Radial pulse regular  Musculoskeletal: Gait appears normal, no digital clubbing evident, no pre-tibial edema        Impression:   Encounter Diagnoses   Name Primary?    Difficulty sleeping Yes    Chronic insomnia     Tinnitus, unspecified laterality     Circadian rhythm sleep disorder, delayed sleep phase type     LYNDA (obstructive sleep apnea)      Patient's BMI is Body mass index is 19.5 kg/m².          Assessment & Plan  1. Insomnia.  The patient reports long-standing sleep issues, including difficulty initiating and maintaining sleep, often due to an overactive mind and leg discomfort from dancing. She has tried various over-the-counter sleep aids without success. A home sleep apnea test is recommended to rule out sleep apnea and hypoxemia, as these are common causes of insomnia. The test will measure respiratory effort, airflow, oxygen saturation, heart rate, body position, and snoring. Cognitive behavioral therapy may be considered if sleep apnea is ruled out.    2. Hashimoto's Thyroiditis.  The patient is on a gluten-free diet as recommended by endocrinologists, which has improved her symptoms of fatigue. She reports less severe fatigue compared to a year ago. No changes to the current management plan are indicated at this time.    3. Tinnitus.  The patient uses a white noise machine to manage tinnitus and city noises, which helps her sleep. No additional treatment is required at this time.    4. Acid Reflux.  The patient has an adjustable bed to prevent acid reflux, which has been effective. She avoids eating late at night to manage symptoms. No further intervention is needed.         The patient should practice good sleep hygiene measures.      Weight loss might be beneficial in this patient who has a Body mass index is 19.5 kg/m².      Pathophysiology of LYNAD described to the patient.  Cardiovascular complications of untreated LYNDA also reviewed.      The patient was cautioned about  the dangers of drowsy driving.    Patient or patient representative verbalized consent for the use of Ambient Listening during the visit with  Abhi Castorena MD for chart documentation. 10/14/2024  13:35 EDT     Abhi Castorena MD  Sleep Medicine  10/14/24  13:21 EDT

## 2024-10-30 ENCOUNTER — TELEPHONE (OUTPATIENT)
Dept: SLEEP MEDICINE | Facility: HOSPITAL | Age: 74
End: 2024-10-30
Payer: MEDICARE

## 2024-11-06 ENCOUNTER — HOSPITAL ENCOUNTER (OUTPATIENT)
Dept: SLEEP MEDICINE | Facility: HOSPITAL | Age: 74
Discharge: HOME | End: 2024-11-06
Admitting: PSYCHIATRY & NEUROLOGY
Payer: MEDICARE

## 2024-11-06 PROCEDURE — G0399 HOME SLEEP TEST/TYPE 3 PORTA: HCPCS

## 2024-11-12 ENCOUNTER — OFFICE VISIT (OUTPATIENT)
Dept: SLEEP MEDICINE | Facility: HOSPITAL | Age: 74
End: 2024-11-12
Payer: MEDICARE

## 2024-11-12 ENCOUNTER — TELEPHONE (OUTPATIENT)
Dept: SLEEP MEDICINE | Facility: HOSPITAL | Age: 74
End: 2024-11-12
Payer: MEDICARE

## 2024-11-12 VITALS — HEIGHT: 64 IN | BODY MASS INDEX: 19.46 KG/M2 | OXYGEN SATURATION: 98 % | WEIGHT: 114 LBS | HEART RATE: 76 BPM

## 2024-11-12 DIAGNOSIS — G47.33 OSA (OBSTRUCTIVE SLEEP APNEA): ICD-10-CM

## 2024-11-12 DIAGNOSIS — F51.04 CHRONIC INSOMNIA: Primary | ICD-10-CM

## 2024-11-12 DIAGNOSIS — R06.83 SNORING: ICD-10-CM

## 2024-11-12 DIAGNOSIS — G47.21 CIRCADIAN RHYTHM SLEEP DISORDER, DELAYED SLEEP PHASE TYPE: ICD-10-CM

## 2024-11-12 PROCEDURE — G0463 HOSPITAL OUTPT CLINIC VISIT: HCPCS

## 2024-11-12 PROCEDURE — 99214 OFFICE O/P EST MOD 30 MIN: CPT | Performed by: PSYCHIATRY & NEUROLOGY

## 2024-11-12 NOTE — TELEPHONE ENCOUNTER
I called Pt and went over sleep report and went over Dr Bonner suggestion for in lab. Pt stated that she has appt with Dr Castorena today  and wanted to hold off on scheduling in lab

## 2024-11-12 NOTE — PROGRESS NOTES
Follow Up [facility] Note       Primary Care Physician: Dano Virk MD    Interval History:   The patient is a 74 y.o. female      History of Present Illness  The patient presents for evaluation of sleep apnea.    She reports that she was informed this morning about the results of her sleep study, which indicated that she snores 95% of the time. This result puzzles her as she is a nose breather and does not share her bedroom with anyone. She mentions that she has not experienced any episodes of waking up due to her own snoring for a significant period.    She recalls that on the night of her sleep study, she had less than 4 hours of sleep and felt extremely tired. She also woke up once to use the bathroom, which is a common occurrence for her. She usually sleeps on her back with her head elevated and does not make any noise. She believes her main issue is an overactive mind, which often prevents her from falling back asleep after waking up to use the bathroom.    She is currently taking a new supplement containing mushroom powder, which she believes has increased her sleep duration by an hour or more per night. However, she still experiences occasional nights, about once a week, where she gets very little sleep. She also mentions that she tends to sleep a lot after periods of sleep deprivation, which then makes it difficult for her to sleep afterwards. She has been dealing with these issues for many years, since her menopausal period.    She had acid reflux in the past.     To be clear the patient disputes the idea that she snores 95% of the time saying that she would wake yourself up if she snored.  Also pointed out that she had acid reflux which is often associated with sleep apnea but she countered that she might have that because she eats late.          Review of Systems:    A complete review of systems was done and all were negative with the exception of none    Social History:    Social History  "    Socioeconomic History    Marital status:     Number of children: 3    Years of education: College   Tobacco Use    Smoking status: Never    Smokeless tobacco: Never    Tobacco comments:     parents were smokers   Vaping Use    Vaping status: Never Used   Substance and Sexual Activity    Alcohol use: Yes     Alcohol/week: 1.0 standard drink of alcohol     Types: 1 Glasses of wine per week     Comment: one drink a week or less    Drug use: No    Sexual activity: Yes     Partners: Male     Birth control/protection: Post-menopausal, Tubal ligation, Hysterectomy       Allergies:  Sulfa antibiotics     Medication Review:  Reviewed.      Vital Signs:    Vitals:    11/12/24 1028   Pulse: 76   SpO2: 98%   Weight: 51.7 kg (114 lb)   Height: 162.6 cm (64\")     Body mass index is 19.57 kg/m².  .BMIFOLLOWUP    Physical Exam:    Constitutional:  Well developed 74 y.o. female that appears in no apparent distress.  Awake & oriented times 3.  Normal mood with normal recent and remote memory and normal judgement.  Eyes:  Conjunctivae normal.      Self-administered [place] Sleepiness Scale test results: 7  0-5 Lower normal daytime sleepiness  6-10 Higher normal daytime sleepiness  11-12 Mild, 13-15 Moderate, & 16-24 Severe excessive daytime sleepiness         Impression:     Encounter Diagnoses   Name Primary?    Chronic insomnia Yes    Circadian rhythm sleep disorder, delayed sleep phase type     Snoring      I expressed to her my concern that she might have more sleep apnea than shows up on the study because she snores 95% of the time.  She disputes that saying that that is not possible.  In any case she would prefer to have cognitive behavioral therapy for insomnia not pursue any more sleep studies or occasion for medication and so I have made a referral for CBT for insomnia.    Assessment & Plan  1. Sleep Apnea.  The sleep study results indicate 1.5 events per hour and snoring 95% of the time. Despite the patient " reporting that she generally wakes herself up when snoring, the study suggests otherwise. She also mentioned having less than 4 hours of sleep the night before the test, which might have affected the results. Acid reflux, which is associated with sleep apnea, was noted.     2. Insomnia.  The patient reports difficulty falling asleep and staying asleep due to an overactive mind, especially after waking up to use the bathroom. She has been taking a new supplement with mushroom powder, which has helped her get an additional hour of sleep on average. Cognitive behavioral therapy for insomnia was discussed and agreed upon as a treatment option.           Good sleep hygiene measures should be maintained.      ion and review of downloads, I recommend no changes to the patient's pressures.      I answered all of the patient's questions.  The patient will call the Sleep Disorder Center for any problems and will follow up as needed.    Patient or patient representative verbalized consent for the use of Ambient Listening during the visit with  Abhi Castorena MD for chart documentation. 11/12/2024  10:49 EST           Abhi Castorena MD  Sleep Medicine  11/12/24  10:48 EST

## 2025-02-17 NOTE — PROGRESS NOTES
1. Daily laxative regimen to make you more regular. Goal is 1-2 easy bowel movements per day.  Need to take every day or several times per week.  Options:    A.  Stool softeners - Colace/docusate - 2-6 pills per day (all at once or 1-4 twice per day)  - Bran cereal (including Raisin Bran) - really works, try with almond milk    -  Benefiber (same) (synthetic; less bloating)      B. less bloating: \"Miralax\"  (white and purple bottle) 1/2 to 1 capful of powder in a glass of water daily (or at least 3x per week);    OR \"Magnesium Oxide\" or \"Magnesium Citrate\" pills - 400-800mg per day  OR \"CALM\" laxative (magnesium)    C. Stronger natural remedies -   Aloe Vera liquid/syrup or capsules - QuicklyChat and Intucell/many pharmacies;  Papaya or Prunes/prune juice does really work; Dragon Fruit may work  \"Calm\" magnesium product: Gentle laxative and apparently magnesium helps with sleep      2. GI schedulers -    Please schedule colonoscopy exam at [facility] ([facility])    BMI Readings from Last 1 Encounters:   02/17/25 26.86 kg/m²     MAC anesthesia     BP Readings from Last 5 Encounters:   02/17/25 121/69   01/02/25 (!) 176/72   11/04/24 131/72   06/13/24 128/77   03/12/24 143/78       Suprep small volume bowel prep if covered by insurance, otherwise   Golytely (PEG) 4L bowel prep  Rx sent in to [facility]       DX = Rectal bleeding; screening colonoscopy examination    Medication instructions:    None       Subjective   Gayatri Lee is a 69 y.o. female     History of Present Illness she returns today to talk about her recent diagnosis of DCIS in the right breast.  She is a nice lady that I first met when she was admitted through the emergency room with mastitis of the right breast.  At that time she had marked asymmetry between breast with the right breast being larger and more firm particularly in the upper half of the breast.  I had never evaluated her before and the patient felt that this was a little different from her norm but not significantly so.  She did respond to antibiotics and we have monitored this area.  Imaging studies have not suggested anything other than changes consistent with possible mastitis.  I did not feel that this area was improving dramatically and we opted to perform a core needle biopsy of this masslike area in the upper half of the breast.  The path report has returned showing low-grade DCIS that is ER positive at 22%, CT negative, and the Ki-67 is 9.39%.  We have obtained an MRI as well and it describes a very large area of suspicious enhancement involving most of the upper half of the breast.  It measures 10 cm in greatest dimension but there is no masslike enhancement.  The suspicious enhancement does appear to extend up into the left nipple.  No enlarged lymph nodes are seen and there are no areas of abnormal enhancement in the left breast.  She is here today to talk about her options.        Review of Systems  Past Medical History:   Diagnosis Date   • Arthritis    • Basal cell carcinoma     followed by Dermatology   • DCIS (ductal carcinoma in situ)     followed by Dr. Denny   • Endometriosis    • Fibrocystic breast    • History of colon polyps     followed by GI, Dr. Cuba   • Hyperlipidemia    • Melanoma (CMS/HCC)    • Menopausal symptoms     followed by [facility], Dr. Elisa Chance   • Osteopenia     followed by Gynecology, Dr. Tequila Boyle   • Prediabetes    •  Rectocele     s/p surgical repair at time of hysterctomy   • Recurrent sinusitis      Past Surgical History:   Procedure Laterality Date   • ABDOMINOPLASTY  2004   • ADENOIDECTOMY  1955   • BREAST SURGERY Left 1976    LUMPECTOMY   • BREAST SURGERY Left 2004    LUMPECTOMY   • BROW LIFT AND BLEPHAROPLASTY  2004   • COLONOSCOPY  06/03/2013    TUBULAR ADENOMAS, TORT COLON,  DR. GOMEZ   • CYSTOCELE REPAIR  05/2017   • ENDOSCOPY     • HAND SURGERY  1990   • HYSTERECTOMY     • LAPAROTOMY OOPHERECTOMY  1994   • LARYNGOSCOPY      IRRITATION   DR. CORREA   • POSTPARTUM TUBAL LIGATION  1980   • TONSILLECTOMY  1955   • TUBAL ABDOMINAL LIGATION     • WISDOM TOOTH EXTRACTION  1970     Family History   Problem Relation Age of Onset   • Cancer Father         Skin   • Heart disease Father    • Stroke Father    • Cancer Sister    • Macular degeneration Sister    • Arthritis Sister    • Alcohol abuse Mother      Social History     Socioeconomic History   • Marital status:      Spouse name: Not on file   • Number of children: Not on file   • Years of education: Not on file   • Highest education level: Not on file   Tobacco Use   • Smoking status: Never Smoker   • Smokeless tobacco: Never Used   Substance and Sexual Activity   • Alcohol use: Yes     Comment: about once per month   • Drug use: No   • Sexual activity: Defer   Social History Narrative    Lives at home in a place of her own.  Enjoys tap and ballroom dance.         Objective   Physical Exam  Constitutional-average weight white female in no acute distress  Vital signs- blood pressure 118/80, pulse 73, temperature 97.3  Assessment/Plan   She was accompanied today by both of her children.  The entire office visit was face-to-face consultation which lasted 1 hour.    We began by reviewing her path report.  I discussed the difference between in situ and invasive disease.  We also discussed the significance of the hormone receptors and that we do not check the HER-2  receptor with DCIS.  We also discussed the surgical options which for DCIS would be a lumpectomy followed by radiation.  If mastectomy is chosen, we generally do not perform radiation treatment but there is the opportunity for reconstruction either immediately or delayed.  The 2 options for reconstruction would be an expander followed by an implant versus a TRAM flap.  We also discussed the recovery time between those 2 options.    We then specifically discussed her situation.  This area of involvement is so large, she is not a candidate for lumpectomy.  With a mastectomy as her best option, she is also not a candidate for a nipple sparing mastectomy.  She would be interested in reconstruction and perhaps being a little smaller than she currently is. Our next step is to have her see the plastic surgeons to discuss her reconstructive options and what would be needed to create symmetry in the other breast.  The patient requested Dr. Mathur and those orders have been placed.    The encounter diagnosis was Ductal carcinoma in situ (DCIS) of right breast.

## 2025-03-14 ENCOUNTER — OFFICE VISIT (OUTPATIENT)
Dept: INTERNAL MEDICINE | Facility: CLINIC | Age: 75
End: 2025-03-14
Payer: MEDICARE

## 2025-03-14 VITALS
WEIGHT: 119.4 LBS | BODY MASS INDEX: 20.38 KG/M2 | OXYGEN SATURATION: 98 % | HEART RATE: 79 BPM | SYSTOLIC BLOOD PRESSURE: 136 MMHG | DIASTOLIC BLOOD PRESSURE: 78 MMHG | HEIGHT: 64 IN

## 2025-03-14 DIAGNOSIS — E87.5 HYPERKALEMIA: ICD-10-CM

## 2025-03-14 DIAGNOSIS — R76.8 ANTI-TPO ANTIBODIES PRESENT: ICD-10-CM

## 2025-03-14 DIAGNOSIS — R53.83 OTHER FATIGUE: Primary | ICD-10-CM

## 2025-03-14 DIAGNOSIS — Z86.39 HISTORY OF HYPERLIPIDEMIA: ICD-10-CM

## 2025-03-14 DIAGNOSIS — M85.80 OSTEOPENIA, UNSPECIFIED LOCATION: ICD-10-CM

## 2025-03-14 RX ORDER — UBIDECARENONE 100 MG
100 CAPSULE ORAL DAILY
COMMUNITY

## 2025-03-14 RX ORDER — MULTIVIT WITH MINERALS/LUTEIN
400 TABLET ORAL DAILY
COMMUNITY

## 2025-03-14 RX ORDER — MULTIVIT WITH MINERALS/LUTEIN
250 TABLET ORAL DAILY
COMMUNITY

## 2025-03-14 NOTE — ASSESSMENT & PLAN NOTE
Try decreasing high fat and greasy foods, increasing exercise to a total of 150 minutes weekly at a brisk walk or more, and increasing vegetables.

## 2025-03-14 NOTE — ASSESSMENT & PLAN NOTE
-  Discussed Antibodies not causing issues. Discussed Ab are common in the population.   - Check TSH levels once or twice annually or if symptoms change  Orders:    Comprehensive Metabolic Panel    CBC & Differential    Sedimentation Rate    C-reactive Protein

## 2025-03-14 NOTE — PROGRESS NOTES
"  Dano Virk M.D.  Internal Medicine  [facility]  [address], [address]  [place] [address]  [phone]      Chief Complaint  Establish Care and Hashimoto's Thyroiditis (Would like to discuss. See's an endo APRN for this. )She has not had a primary care physician for about 1.5 years.     SUBJECTIVE    History of Present Illness    Gayatri Lee is a 75 y.o. female who presents to the office today as a new patient to establish care.     History of Present Illness  The patient came in to establish care and discuss her fatigue, Hashimoto's thyroiditis, high cholesterol, osteopenia, and high potassium levels.    She states she was diagnosed with \"Hashimoto's thyroiditis\" in May 2023. In November 2023, she saw a couple of endocrinologists who recommended a gluten-free diet, which lowered her cholesterol from 238 to 195 in just one month. She has since relaxed her dietary restrictions on dairy and sugar and has started eating meat and eggs. She is under the care of an endocrinologist at [facility] with appointments every six months. Her thyroid levels do not require medication but is concerned about her antibody levels. She has not had a thyroid ultrasound.    She reports feeling fatigued, often needing to close her eyes when seated. Her sleep therapist advises against napping, which has helped, but she takes 20-30 minute power naps when absolutely necessary. Some days, she lacks the motivation to exercise, which she finds essential for her well-being. Her exercise routine includes walking, yoga, strength training, and dancing. She suspects her thyroid condition might be causing her fatigue. She tested positive for celiac disease but has not had a colonoscopy. She states her gastroenterologist, Dr. Cuba, was not concerned about her celiac panel. Despite following a gluten-free diet, her fatigue persists, possibly due to allergy season. She reports poor sleep quality this week. She " does not feel depressed but has experienced recent stressors, including a breakup and her brother-in-law's death. She enjoys her usual activities. She reports joint pain in her big toes from dancing but no other signs of arthritis. She does not have issues with constipation, diarrhea, or abdominal pain.    She has a history of breast cancer and had a right mastectomy in . She also had melanoma surgery on her chest the same year and sees a dermatologist every six months. Her breast cancer was confined to the duct (DCIS), and she had four lymph nodes removed, none of which were cancerous. She was unable to tolerate tamoxifen and discontinued it early. She has been advised to consider an MRI in the future.    She has a history of a hysterectomy and hormone-related health issues. She has had sleep issues since menopause and was previously on progesterone. She has a history of fibrocystic disease, ovarian cysts, breast cysts, and endometriosis. She has a gynecology appointment in May 2025 with LALA Donnelly at [facility]. She gets annual mammograms and had a bone density scan this year.    She has long-standing osteopenia and has reduced her vitamin D supplements due to high levels. She takes a plant-based calcium supplement and tries to get calcium through plant-based sources. She monitors her thyroid levels every three months and has reduced her potassium supplements.    She is a never smoker and has one or less drink a week.    Father had heart disease, emphysema, and  from kidney failure. Mother had CREST syndrome and was an alcoholic.      Review of Systems    Allergies   Allergen Reactions    Sulfa Antibiotics Rash        Outpatient Medications Marked as Taking for the 3/14/25 encounter (Office Visit) with Dano Virk MD   Medication Sig Dispense Refill    B Complex Vitamins (vitamin b complex) capsule capsule Take 1 capsule by mouth Daily.      BETA CAROTENE PO Take  by mouth.      CALCIUM LACTATE PO Take  350 mg by mouth Daily.      cholecalciferol (VITAMIN D3) 1000 units tablet Take 0.5 tablets by mouth Daily.      coenzyme Q10 100 MG capsule Take 1 capsule by mouth Daily.      Cranberry-D Mannose 158-500 MG capsule Take  by mouth.      FISH OIL-KRILL OIL PO Take  by mouth.      LECITHIN PO Take  by mouth.      magnesium oxide (MAG-OX) 400 tablet tablet Take 150 mg by mouth Daily.      Menaquinone-7 (K2 PO) Take  by mouth.      Menaquinone-7 (VITAMIN K2 PO) Take 200 mcg by mouth Daily.      NON FORMULARY Patient takes iodine      Selenium (SELENIMIN PO) Take 50 mcg by mouth.      valACYclovir (VALTREX) 500 MG tablet Take 1 tablet by mouth Daily.      vitamin C (ASCORBIC ACID) 250 MG tablet Take 1 tablet by mouth Daily.      VITAMIN E 400 UNIT capsule Take 1 capsule by mouth Daily.          Past Medical History:   Diagnosis Date    Abnormal thyroid exam 08/13/2024    Allergic 1990s    Arthritis     Basal cell carcinoma     followed by Dermatology    Cellulitis of right breast 02/10/2019    Chronic gastritis     Colon polyp     DCIS (ductal carcinoma in situ)     followed by Dr. Denny (RIGHT)    Dry eye     Elevated HDL     GERD (gastroesophageal reflux disease) 2012?    no symtoms now    Hashimoto's thyroiditis May 2023    History of colon polyps     followed by GI, Dr. Cuba    HL (hearing loss) 2023    HSV-2 seropositive     Hyperlipidemia     Hypothyroidism May 2023    Mass of breast, right 02/22/2019    Melanoma     Osteopenia     followed by Gynecology, Dr. Tequila Boyle    Pelvic prolapse     Polycystic ovary syndrome 1994    Recurrent sinusitis     Right foot pain 06/14/2018    Slow to wake up after anesthesia     Tinnitus      Past Surgical History:   Procedure Laterality Date    ABDOMINAL SURGERY  2004    ABDOMINOPLASTY  2004    ADENOIDECTOMY  1955    ADENOIDECTOMY  1955    BREAST RECONSTRUCTION Right 05/15/2019    Procedure: RIGHT PLACEMENT OF TISSUE EXPANDER WITH ALLODERM;  Surgeon: ROCK Mathur,  MD;  Location: [facility] MAIN OR;  Service: Plastics    BREAST SURGERY Left 1976    LUMPECTOMY    BREAST SURGERY Left 2004    LUMPECTOMY    BREAST TISSUE EXPANDER REMOVAL INSERTION OF IMPLANT Bilateral 09/23/2019    Procedure: RIGHT BREAST TISSUE EXPANDER REMOVAL INSERTION OF IMPLANT LEFT REDUCTION OF SYMMETRY AND REMOVAL OF LEFT NIPPLE;  Surgeon: ROCK Mathur MD;  Location: [facility] MAIN OR;  Service: Plastics    BROW LIFT AND BLEPHAROPLASTY  2004    COLONOSCOPY  06/03/2013    TUBULAR ADENOMAS, TORT COLON,  DR. CUBA    COLONOSCOPY N/A 07/07/2020    Procedure: COLONOSCOPY to cecum;  Surgeon: Felix Cuba MD;  Location: [facility] ENDOSCOPY;  Service: Gastroenterology;  Laterality: N/A;  PRE: Person History of Polyps  POST: Hemorrhoids, Tortuous Colon, Normal to Cecum    COLONOSCOPY W/ POLYPECTOMY      COSMETIC SURGERY  2004    CYSTOCELE REPAIR  05/2017    DILATATION AND CURETTAGE      ENDOSCOPY      EYE SURGERY      FRACTURE SURGERY  1990s    left hand    HAND SURGERY  1990    HYSTERECTOMY  2017    LAPAROTOMY OOPHERECTOMY  1994    LARYNGOSCOPY      IRRITATION   DR. CORREA    LYMPH NODE BIOPSY  2019    MASTECTOMY W/ SENTINEL NODE BIOPSY Right 05/15/2019    Procedure: RIGHT BREAST MASTECTOMY WITH SENTINEL NODE BIOPSY;  Surgeon: Daniel Denny MD;  Location: [facility] OR;  Service: General    OOPHORECTOMY      TONSILLECTOMY  1955    TUBAL ABDOMINAL LIGATION  1980    UPPER GASTROINTESTINAL ENDOSCOPY      WISDOM TOOTH EXTRACTION  1970     Family History   Problem Relation Age of Onset    Cancer Father         skin cancer    Heart disease Father     Stroke Father     Asthma Father     Cancer Sister         skin cancer    Macular degeneration Sister     Arthritis Sister     Hearing loss Sister     Alcohol abuse Mother     Scleroderma Mother     Miscarriages / Stillbirths Mother     Malig Hyperthermia Neg Hx     reports that she has never smoked. She has never used smokeless tobacco. She reports current  "alcohol use of about 1.0 standard drink of alcohol per week. She reports that she does not use drugs.    OBJECTIVE    Vital Signs:   /78   Pulse 79   Ht 162.6 cm (64.02\")   Wt 54.2 kg (119 lb 6.4 oz)   SpO2 98%   BMI 20.48 kg/m²     Physical Exam  Constitutional:       Appearance: Normal appearance.   Neck:      Thyroid: No thyroid mass or thyromegaly.   Cardiovascular:      Rate and Rhythm: Normal rate and regular rhythm.      Heart sounds: Normal heart sounds. No murmur heard.  Pulmonary:      Effort: Pulmonary effort is normal.      Breath sounds: Normal breath sounds.   Abdominal:      General: Abdomen is flat. There is no distension.      Palpations: Abdomen is soft.      Tenderness: There is no abdominal tenderness.   Skin:     General: Skin is warm and dry.   Neurological:      Mental Status: She is alert.   Psychiatric:         Mood and Affect: Mood normal.         Behavior: Behavior normal.         Thought Content: Thought content normal.          Physical Exam      The following data was reviewed by: Dano Virk MD on 03/14/2025:      Cortisol 15.3        Lab Results   Component Value Date    WBC 7.50 03/14/2025    HGB 13.9 03/14/2025    HCT 43.0 03/14/2025    MCV 93.9 03/14/2025     03/14/2025     Lab Results   Component Value Date    GLUCOSE 102 (H) 03/14/2025    BUN 22 03/14/2025    CREATININE 0.85 03/14/2025     03/14/2025    K 4.9 03/14/2025     03/14/2025    CALCIUM 10.1 03/14/2025    PROTEINTOT 7.2 03/14/2025    ALBUMIN 4.3 03/14/2025    ALT 17 03/14/2025    AST 24 03/14/2025    ALKPHOS 82 03/14/2025    BILITOT 0.4 03/14/2025    GLOB 2.9 03/14/2025    AGRATIO 1.5 03/14/2025    BCR 25.9 (H) 03/14/2025    ANIONGAP 9.5 11/12/2020    EGFR 71.5 03/14/2025     Lab Results   Component Value Date    CHLPL 212 (H) 02/03/2020    TRIG 86 02/03/2020    HDL 77 02/03/2020     (H) 02/03/2020     Lab Results   Component Value Date    TSH 1.440 09/09/2021    A6KDZCI 0.90 " 11/21/2023    THYROIDAB 58.30 (H) 11/21/2023         Data reviewed : previous endocrine note              ASSESSMENT & PLAN        Other fatigue  - Comprehensive workup for fatigue done  - does not meet criteria for diagnosed sleep apnea but has primary snoring   - TSH levels within target range, unlikely thyroid-related  - Normal cortisol, DHEA-S, and ACTH levels  - Ensure up-to-date cancer screenings  - Satisfactory blood pressure  - Continue current diet if beneficial  - No need to monitor antibody levels  - Discussed referral to gastroenterologist to rule out celiac disease but it would only be helpful if she was eating gluten  Orders:    Comprehensive Metabolic Panel    CBC & Differential    Sedimentation Rate    C-reactive Protein    Anti-TPO antibodies present  -  Discussed Antibodies not causing issues. Discussed Ab are common in the population.   - Check TSH levels once or twice annually or if symptoms change  Orders:    Comprehensive Metabolic Panel    CBC & Differential    Sedimentation Rate    C-reactive Protein    History of hyperlipidemia  Try decreasing high fat and greasy foods, increasing exercise to a total of 150 minutes weekly at a brisk walk or more, and increasing vegetables.          Hyperkalemia  - Potassium levels slightly high  - She states she reduced potassium supplements  - Advised not to take potassium supplements unless prescribed  - Recheck potassium levels today        Osteopenia, unspecified location  - History of osteopenia, taking plant-based calcium supplement  - Ensure 1200 mg of calcium through supplements or diet          Assessment & Plan        Health Maintenance Due   Topic Date Due    ZOSTER VACCINE (2 of 2) 06/11/2019    Pneumococcal Vaccine 50+ (2 of 2 - PPSV23) 07/17/2019    INFLUENZA VACCINE  07/01/2024    ANNUAL WELLNESS VISIT  11/08/2024    LIPID PANEL  01/03/2025    RSV Vaccine - Adults (1 - 1-dose 75+ series) Never done    COLORECTAL CANCER SCREENING  07/07/2025         Follow Up  No follow-ups on file.    Patient/family had no further questions at this time and verbalized understanding of the plan discussed today.     Patient or patient representative verbalized consent for the use of Ambient Listening during the visit with  Dano Virk MD for chart documentation. 3/15/2025  15:34 EDT

## 2025-03-15 LAB
ALBUMIN SERPL-MCNC: 4.3 G/DL (ref 3.5–5.2)
ALBUMIN/GLOB SERPL: 1.5 G/DL
ALP SERPL-CCNC: 82 U/L (ref 39–117)
ALT SERPL-CCNC: 17 U/L (ref 1–33)
AST SERPL-CCNC: 24 U/L (ref 1–32)
BASOPHILS # BLD AUTO: 0.1 10*3/MM3 (ref 0–0.2)
BASOPHILS NFR BLD AUTO: 1.3 % (ref 0–1.5)
BILIRUB SERPL-MCNC: 0.4 MG/DL (ref 0–1.2)
BUN SERPL-MCNC: 22 MG/DL (ref 8–23)
BUN/CREAT SERPL: 25.9 (ref 7–25)
CALCIUM SERPL-MCNC: 10.1 MG/DL (ref 8.6–10.5)
CHLORIDE SERPL-SCNC: 101 MMOL/L (ref 98–107)
CO2 SERPL-SCNC: 28.6 MMOL/L (ref 22–29)
CREAT SERPL-MCNC: 0.85 MG/DL (ref 0.57–1)
CRP SERPL-MCNC: <0.3 MG/DL (ref 0–0.5)
EGFRCR SERPLBLD CKD-EPI 2021: 71.5 ML/MIN/1.73
EOSINOPHIL # BLD AUTO: 0.25 10*3/MM3 (ref 0–0.4)
EOSINOPHIL NFR BLD AUTO: 3.3 % (ref 0.3–6.2)
ERYTHROCYTE [DISTWIDTH] IN BLOOD BY AUTOMATED COUNT: 13.2 % (ref 12.3–15.4)
ERYTHROCYTE [SEDIMENTATION RATE] IN BLOOD BY WESTERGREN METHOD: 4 MM/HR (ref 0–30)
GLOBULIN SER CALC-MCNC: 2.9 GM/DL
GLUCOSE SERPL-MCNC: 102 MG/DL (ref 65–99)
HCT VFR BLD AUTO: 43 % (ref 34–46.6)
HGB BLD-MCNC: 13.9 G/DL (ref 12–15.9)
IMM GRANULOCYTES # BLD AUTO: 0.02 10*3/MM3 (ref 0–0.05)
IMM GRANULOCYTES NFR BLD AUTO: 0.3 % (ref 0–0.5)
LYMPHOCYTES # BLD AUTO: 1.84 10*3/MM3 (ref 0.7–3.1)
LYMPHOCYTES NFR BLD AUTO: 24.5 % (ref 19.6–45.3)
MCH RBC QN AUTO: 30.3 PG (ref 26.6–33)
MCHC RBC AUTO-ENTMCNC: 32.3 G/DL (ref 31.5–35.7)
MCV RBC AUTO: 93.9 FL (ref 79–97)
MONOCYTES # BLD AUTO: 0.6 10*3/MM3 (ref 0.1–0.9)
MONOCYTES NFR BLD AUTO: 8 % (ref 5–12)
NEUTROPHILS # BLD AUTO: 4.69 10*3/MM3 (ref 1.7–7)
NEUTROPHILS NFR BLD AUTO: 62.6 % (ref 42.7–76)
NRBC BLD AUTO-RTO: 0 /100 WBC (ref 0–0.2)
PLATELET # BLD AUTO: 254 10*3/MM3 (ref 140–450)
POTASSIUM SERPL-SCNC: 4.9 MMOL/L (ref 3.5–5.2)
PROT SERPL-MCNC: 7.2 G/DL (ref 6–8.5)
RBC # BLD AUTO: 4.58 10*6/MM3 (ref 3.77–5.28)
SODIUM SERPL-SCNC: 140 MMOL/L (ref 136–145)
WBC # BLD AUTO: 7.5 10*3/MM3 (ref 3.4–10.8)

## 2025-03-15 NOTE — ASSESSMENT & PLAN NOTE
- History of osteopenia, taking plant-based calcium supplement  - Ensure 1200 mg of calcium through supplements or diet

## 2025-03-26 ENCOUNTER — TELEPHONE (OUTPATIENT)
Dept: GASTROENTEROLOGY | Facility: CLINIC | Age: 75
End: 2025-03-26
Payer: MEDICARE

## 2025-03-26 DIAGNOSIS — D12.6 ADENOMATOUS POLYP OF COLON, UNSPECIFIED PART OF COLON: Primary | ICD-10-CM

## 2025-03-26 NOTE — TELEPHONE ENCOUNTER
Last colonoscopy 7/7/20    Personal hx polyps  Unknown family hx polyps  No family hx cx    ASA or blood thinners:  None    List medications:  Valacyclovir  B12  D Mannose  Magnesium  Vitamin C  Calcium  D3  K2  Krill oil  Kyolic garlic  Selenium  CoQ10  Iodine    OA form scanned in media

## 2025-03-27 ENCOUNTER — TELEPHONE (OUTPATIENT)
Dept: GASTROENTEROLOGY | Facility: CLINIC | Age: 75
End: 2025-03-27
Payer: MEDICARE

## 2025-03-27 NOTE — TELEPHONE ENCOUNTER
PAPO BERG IN SCHEDULING PT SCHEDULED 06/05/2025 ARRIVING AT 0730AM CLS PREP INFORMATION MAILED TO ADDRESS ON FILE VERIFIED BY PT OK FOR THE HUB TO RELAY

## 2025-06-05 ENCOUNTER — HOSPITAL ENCOUNTER (OUTPATIENT)
Facility: HOSPITAL | Age: 75
Setting detail: HOSPITAL OUTPATIENT SURGERY
Discharge: HOME | End: 2025-06-05
Attending: INTERNAL MEDICINE | Admitting: INTERNAL MEDICINE
Payer: MEDICARE

## 2025-06-05 ENCOUNTER — ANESTHESIA (OUTPATIENT)
Dept: GASTROENTEROLOGY | Facility: HOSPITAL | Age: 75
End: 2025-06-05
Payer: MEDICARE

## 2025-06-05 ENCOUNTER — ANESTHESIA EVENT (OUTPATIENT)
Dept: GASTROENTEROLOGY | Facility: HOSPITAL | Age: 75
End: 2025-06-05
Payer: MEDICARE

## 2025-06-05 VITALS
HEART RATE: 61 BPM | WEIGHT: 115 LBS | BODY MASS INDEX: 19.63 KG/M2 | DIASTOLIC BLOOD PRESSURE: 83 MMHG | RESPIRATION RATE: 16 BRPM | HEIGHT: 64 IN | SYSTOLIC BLOOD PRESSURE: 130 MMHG | OXYGEN SATURATION: 100 %

## 2025-06-05 PROCEDURE — 25010000002 PROPOFOL 10 MG/ML EMULSION: Performed by: NURSE ANESTHETIST, CERTIFIED REGISTERED

## 2025-06-05 PROCEDURE — 25010000002 LIDOCAINE PF 2% 2 % SOLUTION: Performed by: NURSE ANESTHETIST, CERTIFIED REGISTERED

## 2025-06-05 PROCEDURE — S0260 H&P FOR SURGERY: HCPCS | Performed by: INTERNAL MEDICINE

## 2025-06-05 PROCEDURE — 25810000003 LACTATED RINGERS PER 1000 ML: Performed by: INTERNAL MEDICINE

## 2025-06-05 PROCEDURE — G0105 COLORECTAL SCRN; HI RISK IND: HCPCS | Performed by: INTERNAL MEDICINE

## 2025-06-05 PROCEDURE — 25010000002 GLYCOPYRROLATE 0.2 MG/ML SOLUTION: Performed by: NURSE ANESTHETIST, CERTIFIED REGISTERED

## 2025-06-05 RX ORDER — SODIUM CHLORIDE, SODIUM LACTATE, POTASSIUM CHLORIDE, CALCIUM CHLORIDE 600; 310; 30; 20 MG/100ML; MG/100ML; MG/100ML; MG/100ML
30 INJECTION, SOLUTION INTRAVENOUS CONTINUOUS PRN
Status: DISCONTINUED | OUTPATIENT
Start: 2025-06-05 | End: 2025-06-05 | Stop reason: HOSPADM

## 2025-06-05 RX ORDER — LIDOCAINE HYDROCHLORIDE 20 MG/ML
INJECTION, SOLUTION EPIDURAL; INFILTRATION; INTRACAUDAL; PERINEURAL AS NEEDED
Status: DISCONTINUED | OUTPATIENT
Start: 2025-06-05 | End: 2025-06-05 | Stop reason: SURG

## 2025-06-05 RX ORDER — GLYCOPYRROLATE 0.2 MG/ML
INJECTION INTRAMUSCULAR; INTRAVENOUS AS NEEDED
Status: DISCONTINUED | OUTPATIENT
Start: 2025-06-05 | End: 2025-06-05 | Stop reason: SURG

## 2025-06-05 RX ORDER — PROPOFOL 10 MG/ML
VIAL (ML) INTRAVENOUS AS NEEDED
Status: DISCONTINUED | OUTPATIENT
Start: 2025-06-05 | End: 2025-06-05 | Stop reason: SURG

## 2025-06-05 RX ADMIN — SODIUM CHLORIDE, POTASSIUM CHLORIDE, SODIUM LACTATE AND CALCIUM CHLORIDE 30 ML/HR: 600; 310; 30; 20 INJECTION, SOLUTION INTRAVENOUS at 08:18

## 2025-06-05 RX ADMIN — PROPOFOL 40 MG: 10 INJECTION, EMULSION INTRAVENOUS at 08:44

## 2025-06-05 RX ADMIN — PROPOFOL 20 MG: 10 INJECTION, EMULSION INTRAVENOUS at 08:50

## 2025-06-05 RX ADMIN — LIDOCAINE HYDROCHLORIDE 50 MG: 20 INJECTION, SOLUTION EPIDURAL; INFILTRATION; INTRACAUDAL; PERINEURAL at 08:44

## 2025-06-05 RX ADMIN — GLYCOPYRROLATE 0.2 MG: 0.2 INJECTION INTRAMUSCULAR; INTRAVENOUS at 08:40

## 2025-06-05 RX ADMIN — PROPOFOL 140 MCG/KG/MIN: 10 INJECTION, EMULSION INTRAVENOUS at 08:44

## 2025-06-05 NOTE — H&P
[facility]  Pre Procedure History & Physical    Chief Complaint:   Polyps, history of breast cancer    Subjective     HPI:   This 75-year-old female presents to the endoscopy suite for colonoscopic examination.  She has a personal history of polyps and a personal history of breast cancer.  Her last colonoscopy was performed in 2020.    Past Medical History:   Past Medical History:   Diagnosis Date    Abnormal thyroid exam 08/13/2024    Allergic 1990s    Arthritis     Basal cell carcinoma     followed by Dermatology    Breast cancer y    Cellulitis of right breast 02/10/2019    Chronic gastritis     Colon polyp     DCIS (ductal carcinoma in situ)     followed by Dr. Denny (RIGHT)    Dry eye     Elevated HDL     GERD (gastroesophageal reflux disease) 2012?    no symtoms now    Hashimoto's thyroiditis May 2023    NO MEDICATIONS    History of colon polyps     followed by GI, Dr. Cuba    HL (hearing loss) 2023    HSV-2 seropositive     Hyperlipidemia     Hypothyroidism May 2023    Mass of breast, right 02/22/2019    Melanoma     Osteopenia     followed by Gynecology, Dr. Tequila Boyle    Pelvic prolapse     Polycystic ovary syndrome 1994    Recurrent sinusitis     Right foot pain 06/14/2018    Seasonal allergies 1990s    Slow to wake up after anesthesia     Tinnitus        Past Surgical History:  Past Surgical History:   Procedure Laterality Date    ABDOMINAL SURGERY  2004    ABDOMINOPLASTY  2004    ADENOIDECTOMY  1955    ADENOIDECTOMY  1955    BREAST AUGMENTATION  2019    BREAST BIOPSY  1976, 2004, 2019    left and right    BREAST CYST ASPIRATION  1990s    BREAST RECONSTRUCTION Right 05/15/2019    Procedure: RIGHT PLACEMENT OF TISSUE EXPANDER WITH ALLODERM;  Surgeon: ROCK Mathur MD;  Location: [facility];  Service: Plastics    BREAST SURGERY Left 1976    LUMPECTOMY    BREAST SURGERY Left 2004    LUMPECTOMY    BREAST TISSUE EXPANDER REMOVAL INSERTION OF IMPLANT Bilateral  09/23/2019    Procedure: RIGHT BREAST TISSUE EXPANDER REMOVAL INSERTION OF IMPLANT LEFT REDUCTION OF SYMMETRY AND REMOVAL OF LEFT NIPPLE;  Surgeon: ROCK Mathur MD;  Location:  [facility]IN OR;  Service: Plastics    BROW LIFT AND BLEPHAROPLASTY  2004    COLONOSCOPY  06/03/2013    TUBULAR ADENOMAS, TORT COLON,  DR. CUBA    COLONOSCOPY N/A 07/07/2020    Procedure: COLONOSCOPY to cecum;  Surgeon: Felix Cuba MD;  Location:  [facility];  Service: Gastroenterology;  Laterality: N/A;  PRE: Person History of Polyps  POST: Hemorrhoids, Tortuous Colon, Normal to Cecum    COLONOSCOPY W/ POLYPECTOMY      COSMETIC SURGERY  2004    CYSTOCELE REPAIR  05/2017    DILATATION AND CURETTAGE      ENDOSCOPY      EYE SURGERY      FRACTURE SURGERY  1990s    left hand    HAND SURGERY  1990    HYSTERECTOMY  2017    LAPAROTOMY OOPHERECTOMY  1994    LARYNGOSCOPY      IRRITATION   DR. CORREA    LYMPH NODE BIOPSY  2019    MASTECTOMY W/ SENTINEL NODE BIOPSY Right 05/15/2019    Procedure: RIGHT BREAST MASTECTOMY WITH SENTINEL NODE BIOPSY;  Surgeon: Daniel Denny MD;  Location: [facility] MAIN OR;  Service: General    OOPHORECTOMY      TONSILLECTOMY  1955    TUBAL ABDOMINAL LIGATION  1980    UPPER GASTROINTESTINAL ENDOSCOPY      WISDOM TOOTH EXTRACTION  1970       Family History:  Family History   Problem Relation Age of Onset    Alcohol abuse Mother     Scleroderma Mother         CREST    Miscarriages / Stillbirths Mother     Cancer Father         skin cancer    Heart disease Father         congestive heart issues    Stroke Father     Asthma Father         childhood    COPD Father         near end of life    Emphysema Father     Heart failure Father     Skin cancer Father     Cancer Sister         skin cancer    Macular degeneration Sister     Arthritis Sister         osteoarthritis    Hearing loss Sister     Malig Hyperthermia Neg Hx        Social History:   reports that she has never smoked. She has never used smokeless  tobacco. She reports current alcohol use of about 1.0 standard drink of alcohol per week. She reports that she does not use drugs.    Medications:   Medications Prior to Admission   Medication Sig Dispense Refill Last Dose/Taking    B Complex Vitamins (vitamin b complex) capsule capsule Take 1 capsule by mouth Daily.   Taking    BETA CAROTENE PO Take  by mouth.   Taking    CALCIUM LACTATE PO Take 350 mg by mouth Daily.   Taking    cholecalciferol (VITAMIN D3) 1000 units tablet Take 0.5 tablets by mouth Daily.   Taking    coenzyme Q10 100 MG capsule Take 1 capsule by mouth Daily.   Taking    Cranberry-D Mannose 158-500 MG capsule Take  by mouth.   Taking    FISH OIL-KRILL OIL PO Take  by mouth.   Taking    magnesium oxide (MAG-OX) 400 tablet tablet Take 150 mg by mouth Daily.   Taking    Menaquinone-7 (K2 PO) Take  by mouth.   Taking    Menaquinone-7 (VITAMIN K2 PO) Take 200 mcg by mouth Daily.   Taking    NON FORMULARY Patient takes iodine   Taking    Selenium (SELENIMIN PO) Take 50 mcg by mouth.   Taking    valACYclovir (VALTREX) 500 MG tablet Take 1 tablet by mouth Daily.   Taking    vitamin C (ASCORBIC ACID) 250 MG tablet Take 1 tablet by mouth Daily.   Taking    VITAMIN E 400 UNIT capsule Take 1 capsule by mouth Daily.   Taking       Allergies:  Sulfa antibiotics    ROS:    Pertinent items are noted in HPI     Objective     not currently breastfeeding.    Physical Exam   Constitutional: Pt is oriented to person, place, and time and well-developed, well-nourished, and in no distress.   Mouth/Throat: Oropharynx is clear and moist.   Neck: Normal range of motion.   Cardiovascular: Normal rate, regular rhythm and normal heart sounds.    Pulmonary/Chest: Effort normal and breath sounds normal.   Abdominal: Soft. Nontender  Skin: Skin is warm and dry.   Psychiatric: Mood, memory, affect and judgment normal.     Assessment & Plan     Diagnosis:  Polyps  History of breast cancer    Anticipated Surgical  Procedure:  Colonoscopy    The risks, benefits, and alternatives of this procedure have been discussed with the patient or the responsible party- the patient understands and agrees to proceed.

## 2025-06-05 NOTE — ANESTHESIA PREPROCEDURE EVALUATION
Anesthesia Evaluation     Patient summary reviewed and Nursing notes reviewed   history of anesthetic complications:  Prolonged sedation  NPO Solid Status: > 8 hours  NPO Liquid Status: > 2 hours           Airway   Mallampati: II  Neck ROM: full  No difficulty expected  Dental    (+) implants    Comment: Bridge upper right    Pulmonary    (+) ,sleep apnea  Cardiovascular     Rhythm: regular    (+) hyperlipidemia      Neuro/Psych  GI/Hepatic/Renal/Endo    (+) GERD, thyroid problem     Musculoskeletal     Abdominal    Substance History      OB/GYN          Other   arthritis,   history of cancer                      Anesthesia Plan    ASA 2     MAC     intravenous induction     Anesthetic plan, risks, benefits, and alternatives have been provided, discussed and informed consent has been obtained with: patient.

## 2025-06-05 NOTE — DISCHARGE INSTRUCTIONS
For the next 24 hours patient needs to be with a responsible adult.    For 24 hours DO NOT drive, operate machinery, appliances, drink alcohol, make important decisions or sign legal documents.    Start with a light or bland diet if you are feeling sick to your stomach otherwise advance to regular diet as tolerated.    Follow recommendations on procedure report if provided by your doctor.    Call Dr Cuba for problems [phone].    Problems may include but not limited to: large amounts of bleeding, trouble breathing, repeated vomiting, severe unrelieved pain, fever or chills.

## 2025-06-05 NOTE — ANESTHESIA POSTPROCEDURE EVALUATION
"Patient: Gayatri Lee    Procedure Summary       Date: 06/05/25 Room / Location: [facility] ENDOSCOPY 1 /  [facility]    Anesthesia Start: 0840 Anesthesia Stop: 0903    Procedure: COLONOSCOPY into cecum and terminal ileum Diagnosis:       Tortuous colon      (Adenomatous polyp of colon, unspecified part of colon [D12.6])    Surgeons: Felix Cuba MD Provider: Eduardo Kimble MD    Anesthesia Type: MAC ASA Status: 2            Anesthesia Type: MAC    Vitals  Vitals Value Taken Time   /83 06/05/25 09:25   Temp     Pulse 61 06/05/25 09:25   Resp 16 06/05/25 09:25   SpO2 100 % 06/05/25 09:25           Post Anesthesia Care and Evaluation    Level of consciousness: awake and alert  Pain management: adequate  Anesthetic complications: No anesthetic complications    Cardiovascular status: acceptable  Respiratory status: acceptable  Hydration status: acceptable    Comments: /83 (BP Location: Left arm, Patient Position: Lying)   Pulse 61   Resp 16   Ht 162.6 cm (64\")   Wt 52.2 kg (115 lb)   SpO2 100%   BMI 19.74 kg/m²       "

## 2025-08-18 ENCOUNTER — OFFICE VISIT (OUTPATIENT)
Dept: INTERNAL MEDICINE | Facility: CLINIC | Age: 75
End: 2025-08-18
Payer: MEDICARE

## 2025-08-18 VITALS
DIASTOLIC BLOOD PRESSURE: 60 MMHG | OXYGEN SATURATION: 98 % | HEIGHT: 64 IN | SYSTOLIC BLOOD PRESSURE: 110 MMHG | WEIGHT: 117 LBS | BODY MASS INDEX: 19.97 KG/M2 | HEART RATE: 62 BPM | RESPIRATION RATE: 16 BRPM

## 2025-08-18 DIAGNOSIS — R53.83 OTHER FATIGUE: ICD-10-CM

## 2025-08-18 DIAGNOSIS — E06.3 HASHIMOTO'S THYROIDITIS: ICD-10-CM

## 2025-08-18 DIAGNOSIS — R19.7 DIARRHEA, UNSPECIFIED TYPE: Primary | ICD-10-CM

## 2025-08-18 PROCEDURE — 1126F AMNT PAIN NOTED NONE PRSNT: CPT | Performed by: NURSE PRACTITIONER

## 2025-08-18 PROCEDURE — 99213 OFFICE O/P EST LOW 20 MIN: CPT | Performed by: NURSE PRACTITIONER

## 2025-08-18 RX ORDER — ESTRADIOL 0.1 MG/G
CREAM VAGINAL
COMMUNITY
Start: 2025-05-22

## 2025-08-19 LAB
ALBUMIN SERPL-MCNC: 4.5 G/DL (ref 3.5–5.2)
ALBUMIN/GLOB SERPL: 1.5 G/DL
ALP SERPL-CCNC: 93 U/L (ref 39–117)
ALT SERPL-CCNC: 19 U/L (ref 1–33)
AST SERPL-CCNC: 22 U/L (ref 1–32)
BASOPHILS # BLD AUTO: 0.09 10*3/MM3 (ref 0–0.2)
BASOPHILS NFR BLD AUTO: 1.1 % (ref 0–1.5)
BILIRUB SERPL-MCNC: 0.2 MG/DL (ref 0–1.2)
BUN SERPL-MCNC: 23 MG/DL (ref 8–23)
BUN/CREAT SERPL: 26.7 (ref 7–25)
CALCIUM SERPL-MCNC: 10.4 MG/DL (ref 8.6–10.5)
CHLORIDE SERPL-SCNC: 100 MMOL/L (ref 98–107)
CO2 SERPL-SCNC: 25.6 MMOL/L (ref 22–29)
CREAT SERPL-MCNC: 0.86 MG/DL (ref 0.57–1)
EGFRCR SERPLBLD CKD-EPI 2021: 70.6 ML/MIN/1.73
EOSINOPHIL # BLD AUTO: 0.49 10*3/MM3 (ref 0–0.4)
EOSINOPHIL NFR BLD AUTO: 6 % (ref 0.3–6.2)
ERYTHROCYTE [DISTWIDTH] IN BLOOD BY AUTOMATED COUNT: 12.7 % (ref 12.3–15.4)
GLOBULIN SER CALC-MCNC: 3.1 GM/DL
GLUCOSE SERPL-MCNC: 90 MG/DL (ref 65–99)
HCT VFR BLD AUTO: 39.1 % (ref 34–46.6)
HGB BLD-MCNC: 13.1 G/DL (ref 12–15.9)
IMM GRANULOCYTES # BLD AUTO: 0.03 10*3/MM3 (ref 0–0.05)
IMM GRANULOCYTES NFR BLD AUTO: 0.4 % (ref 0–0.5)
LYMPHOCYTES # BLD AUTO: 1.91 10*3/MM3 (ref 0.7–3.1)
LYMPHOCYTES NFR BLD AUTO: 23.4 % (ref 19.6–45.3)
MCH RBC QN AUTO: 29.6 PG (ref 26.6–33)
MCHC RBC AUTO-ENTMCNC: 33.5 G/DL (ref 31.5–35.7)
MCV RBC AUTO: 88.5 FL (ref 79–97)
MONOCYTES # BLD AUTO: 0.63 10*3/MM3 (ref 0.1–0.9)
MONOCYTES NFR BLD AUTO: 7.7 % (ref 5–12)
NEUTROPHILS # BLD AUTO: 5 10*3/MM3 (ref 1.7–7)
NEUTROPHILS NFR BLD AUTO: 61.4 % (ref 42.7–76)
NRBC BLD AUTO-RTO: 0 /100 WBC (ref 0–0.2)
PLATELET # BLD AUTO: 291 10*3/MM3 (ref 140–450)
POTASSIUM SERPL-SCNC: 4.7 MMOL/L (ref 3.5–5.2)
PROT SERPL-MCNC: 7.6 G/DL (ref 6–8.5)
RBC # BLD AUTO: 4.42 10*6/MM3 (ref 3.77–5.28)
SODIUM SERPL-SCNC: 137 MMOL/L (ref 136–145)
WBC # BLD AUTO: 8.15 10*3/MM3 (ref 3.4–10.8)
WNV IGG SER QL IA: NEGATIVE
WNV IGM SER QL IA: NEGATIVE

## 2025-08-25 LAB

## (undated) DEVICE — KT ORCA ORCAPOD DISP STRL

## (undated) DEVICE — GOWN,SIRUS,NON REINFRCD,LARGE,SET IN SL: Brand: MEDLINE

## (undated) DEVICE — TUBING, SUCTION, 1/4" X 10', STRAIGHT: Brand: MEDLINE

## (undated) DEVICE — DRSNG SURESITE123 4X10IN

## (undated) DEVICE — DRSNG SURESITE WNDW 4X4.5

## (undated) DEVICE — SPNG GZ WOVN 4X4IN 12PLY 10/BX STRL

## (undated) DEVICE — ENCORE® LATEX ORTHO SIZE 8, STERILE LATEX POWDER-FREE SURGICAL GLOVE: Brand: ENCORE

## (undated) DEVICE — SENSR O2 OXIMAX FNGR A/ 18IN NONSTR

## (undated) DEVICE — DRSNG TELFA PAD NONADH STR 1S 3X8IN

## (undated) DEVICE — LN SMPL CO2 SHTRM SD STREAM W/M LUER

## (undated) DEVICE — ANTIBACTERIAL UNDYED BRAIDED (POLYGLACTIN 910), SYNTHETIC ABSORBABLE SUTURE: Brand: COATED VICRYL

## (undated) DEVICE — GLV SURG PREMIERPRO ORTHO LTX PF SZ8.5 BRN

## (undated) DEVICE — Device

## (undated) DEVICE — ELECTRD BLD EDGE/INSUL1P 2.4X5.1MM STRL

## (undated) DEVICE — LASSO POLYPECTOMY SNARE: Brand: LASSO

## (undated) DEVICE — CANN O2 ETCO2 FITS ALL CONN CO2 SMPL A/ 7IN DISP LF

## (undated) DEVICE — NDL SPINE 20G 3 1/2 YEL STRL 1P/U

## (undated) DEVICE — 1010 S-DRAPE TOWEL DRAPE 10/BX: Brand: STERI-DRAPE™

## (undated) DEVICE — TUBING, SUCTION, 1/4" X 20', STRAIGHT: Brand: MEDLINE INDUSTRIES, INC.

## (undated) DEVICE — SYR LUERLOK 5CC

## (undated) DEVICE — THE TORRENT IRRIGATION SCOPE CONNECTOR IS USED WITH THE TORRENT IRRIGATION TUBING TO PROVIDE IRRIGATION FLUIDS SUCH AS STERILE WATER DURING GASTROINTESTINAL ENDOSCOPIC PROCEDURES WHEN USED IN CONJUNCTION WITH AN IRRIGATION PUMP (OR ELECTROSURGICAL UNIT).: Brand: TORRENT

## (undated) DEVICE — ADAPT CLN BIOGUARD AIR/H2O DISP

## (undated) DEVICE — PAD,ABDOMINAL,8"X10",ST,LF: Brand: MEDLINE

## (undated) DEVICE — DRSNG PAD ABD 8X10IN STRL

## (undated) DEVICE — 3M™ STERI-STRIP™ REINFORCED ADHESIVE SKIN CLOSURES, R1547, 1/2 IN X 4 IN (12 MM X 100 MM), 6 STRIPS/ENVELOPE: Brand: 3M™ STERI-STRIP™

## (undated) DEVICE — BIOPATCH™ ANTIMICROBIAL DRESSING WITH CHLORHEXIDINE GLUCONATE IS A HYDROPHILLIC POLYURETHANE ABSORPTIVE FOAM WITH CHLORHEXIDINE GLUCONATE (CHG) WHICH INHIBITS BACTERIAL GROWTH UNDER THE DRESSING. THE DRESSING IS INTENDED TO BE USED TO ABSORB EXUDATE, COVER A WOUND CAUSED BY VASCULAR AND NONVASCULAR PERCUTANEOUS MEDICAL DEVICES DURING SURGERY, AS WELL AS REDUCE LOCAL INFECTION AND COLONIZATION OF MICROORGANISMS.: Brand: BIOPATCH

## (undated) DEVICE — MARKR SKIN W/RULR AND LBL

## (undated) DEVICE — SYR LUERLOK 50ML

## (undated) DEVICE — PK UNIV COMPL 40

## (undated) DEVICE — SUT ETHLN 3/0 PS1 18IN 1663H

## (undated) DEVICE — GOWN,NON-REINFORCED,SIRUS,SET IN SLV,XL: Brand: MEDLINE

## (undated) DEVICE — SPNG LAP 18X18IN LF STRL PK/5

## (undated) DEVICE — INTENDED FOR TISSUE SEPARATION, AND OTHER PROCEDURES THAT REQUIRE A SHARP SURGICAL BLADE TO PUNCTURE OR CUT.: Brand: BARD-PARKER ® CARBON RIB-BACK BLADES

## (undated) DEVICE — ELECTRD BLD EXT EDGE/INSUL 6IN

## (undated) DEVICE — ASEPTIC FLUID TRANSFER SET: Brand: ASEPTIC FLUID TRANSFER SET

## (undated) DEVICE — PENCL E/S HNDSWTCH SMOKEEVAC HOLSTR 10FT

## (undated) DEVICE — JACKSON-PRATT 100CC BULB RESERVOIR: Brand: CARDINAL HEALTH

## (undated) DEVICE — GLV SURG BIOGEL LTX PF 6 1/2

## (undated) DEVICE — NDL HYPO PRECISIONGLIDE REG 25G 1 1/2

## (undated) DEVICE — SPK PIN NSR FLUID NONVNT 2WY MINI LL LF

## (undated) DEVICE — STPLR SKIN VISISTAT WD 35CT

## (undated) DEVICE — SKIN PREP TRAY W/CHG: Brand: MEDLINE INDUSTRIES, INC.

## (undated) DEVICE — BNDG ELAS ELITE V/CLOSE 6IN 5YD LF STRL

## (undated) DEVICE — NDL BLNT 18G 1 1/2IN

## (undated) DEVICE — SUT VIC 3/0 TIES 18IN J110T

## (undated) DEVICE — Device: Brand: FABCO

## (undated) DEVICE — ADHS SKIN DERMABOND TOP ADVANCED

## (undated) DEVICE — APPL CHLORAPREP W/TINT 26ML ORNG

## (undated) DEVICE — SUT PDS 2/0 SH 27IN Z317H

## (undated) DEVICE — UNDYED MONOFILAMENT (POLYDIOXANONE), ABSORBABLE SYNTHETIC SURGICAL SUTURE: Brand: PDS

## (undated) DEVICE — CVR TRANSD CIV FLX TPR 11.9 TO 3.8X61CM

## (undated) DEVICE — PK CHST BRST 40

## (undated) DEVICE — DRSNG SURESITE WNDW 2.38X2.75

## (undated) DEVICE — ENCORE® LATEX ORTHO SIZE 8.5, STERILE LATEX POWDER-FREE SURGICAL GLOVE: Brand: ENCORE

## (undated) DEVICE — BIFURCATED DRAIN EXTENSION FOR CLOSED WOUND DRAIN: Brand: AXIOM®

## (undated) DEVICE — TOTAL TRAY, 16FR 10ML SIL FOLEY, URN: Brand: MEDLINE

## (undated) DEVICE — SOL NACL 0.9PCT 1000ML

## (undated) DEVICE — IRRIGATOR BULB ASEPTO 60CC STRL

## (undated) DEVICE — DRN WND AXIOM W CLOT STOP 10F

## (undated) DEVICE — SYS ENSING FLUID M/ ADD MAC1001

## (undated) DEVICE — SUT SILK 2/0 FS BLK 18IN 685G